# Patient Record
Sex: FEMALE | Race: WHITE | NOT HISPANIC OR LATINO | Employment: OTHER | URBAN - METROPOLITAN AREA
[De-identification: names, ages, dates, MRNs, and addresses within clinical notes are randomized per-mention and may not be internally consistent; named-entity substitution may affect disease eponyms.]

---

## 2017-10-11 ENCOUNTER — CONVERSION ENCOUNTER (OUTPATIENT)
Dept: OTHER | Facility: HOSPITAL | Age: 72
End: 2017-10-11

## 2018-05-04 ENCOUNTER — OFFICE VISIT CONVERTED (OUTPATIENT)
Dept: FAMILY MEDICINE CLINIC | Age: 73
End: 2018-05-04
Attending: FAMILY MEDICINE

## 2018-07-19 ENCOUNTER — OFFICE VISIT CONVERTED (OUTPATIENT)
Dept: FAMILY MEDICINE CLINIC | Age: 73
End: 2018-07-19
Attending: FAMILY MEDICINE

## 2018-10-16 ENCOUNTER — CONVERSION ENCOUNTER (OUTPATIENT)
Dept: OTHER | Facility: HOSPITAL | Age: 73
End: 2018-10-16

## 2019-01-03 ENCOUNTER — OFFICE VISIT CONVERTED (OUTPATIENT)
Dept: FAMILY MEDICINE CLINIC | Age: 74
End: 2019-01-03
Attending: FAMILY MEDICINE

## 2019-01-03 ENCOUNTER — HOSPITAL ENCOUNTER (OUTPATIENT)
Dept: OTHER | Facility: HOSPITAL | Age: 74
Discharge: HOME | End: 2019-01-03
Attending: FAMILY MEDICINE

## 2019-01-03 LAB
APPEARANCE UR: ABNORMAL
BACTERIA UR CULT: NORMAL
BACTERIA UR QL AUTO: ABNORMAL
BILIRUB UR QL: NEGATIVE
CASTS URNS QL MICRO: ABNORMAL /[LPF]
COLOR UR: YELLOW
CONV LEUKOCYTE ESTERASE: ABNORMAL
CONV UROBILINOGEN IN URINE BY AUTOMATED TEST STRIP: 1 {EHRLICHU}/DL (ref 0.1–1)
EPI CELLS #/AREA URNS HPF: ABNORMAL /[HPF]
GLUCOSE 24H UR-MCNC: NEGATIVE MG/DL
HGB UR QL STRIP: NEGATIVE
KETONES UR QL STRIP: NEGATIVE MG/DL
MUCOUS THREADS URNS QL MICRO: ABNORMAL
NITRITE UR-MCNC: NEGATIVE MG/ML
PH UR STRIP.AUTO: 6.5 [PH] (ref 5–8)
PROT UR-MCNC: ABNORMAL MG/DL
RBC # BLD AUTO: ABNORMAL /[HPF]
SP GR UR STRIP: 1.02 (ref 1–1.03)
SPECIMEN SOURCE: ABNORMAL
UNIDENT CRYS URNS QL MICRO: ABNORMAL /[HPF]
WBC #/AREA URNS HPF: ABNORMAL /[HPF]

## 2019-01-05 LAB — BACTERIA UR CULT: NORMAL

## 2019-01-08 ENCOUNTER — HOSPITAL ENCOUNTER (OUTPATIENT)
Dept: OTHER | Facility: HOSPITAL | Age: 74
Discharge: HOME | End: 2019-01-08
Attending: FAMILY MEDICINE

## 2019-01-08 LAB
APPEARANCE UR: CLEAR
BACTERIA UR CULT: NORMAL
BACTERIA UR QL AUTO: ABNORMAL
CASTS URNS QL MICRO: ABNORMAL /[LPF]
COLOR UR: ABNORMAL
EPI CELLS #/AREA URNS HPF: ABNORMAL /[HPF]
MUCOUS THREADS URNS QL MICRO: ABNORMAL
RBC # BLD AUTO: ABNORMAL /[HPF]
SP GR UR STRIP: 1.01 (ref 1–1.03)
SPECIMEN SOURCE: ABNORMAL
UNIDENT CRYS URNS QL MICRO: ABNORMAL /[HPF]
WBC #/AREA URNS HPF: ABNORMAL /[HPF]

## 2019-01-10 LAB — BACTERIA UR CULT: NORMAL

## 2019-02-04 ENCOUNTER — HOSPITAL ENCOUNTER (OUTPATIENT)
Dept: OTHER | Facility: HOSPITAL | Age: 74
Discharge: HOME | End: 2019-02-04
Attending: FAMILY MEDICINE

## 2019-02-04 LAB
APPEARANCE UR: CLEAR
BACTERIA UR QL AUTO: NORMAL
BILIRUB UR QL: NEGATIVE
CASTS URNS QL MICRO: NORMAL /[LPF]
COLOR UR: YELLOW
CONV LEUKOCYTE ESTERASE: NEGATIVE
CONV UROBILINOGEN IN URINE BY AUTOMATED TEST STRIP: 0.2 {EHRLICHU}/DL (ref 0.1–1)
EPI CELLS #/AREA URNS HPF: NORMAL /[HPF]
GLUCOSE 24H UR-MCNC: NEGATIVE MG/DL
HGB UR QL STRIP: NEGATIVE
KETONES UR QL STRIP: NEGATIVE MG/DL
MUCOUS THREADS URNS QL MICRO: NORMAL
NITRITE UR-MCNC: NEGATIVE MG/ML
PH UR STRIP.AUTO: 5.5 [PH] (ref 5–8)
PROT UR-MCNC: NEGATIVE MG/DL
RBC # BLD AUTO: NORMAL /[HPF]
SP GR UR STRIP: 1.01 (ref 1–1.03)
SPECIMEN SOURCE: NORMAL
UNIDENT CRYS URNS QL MICRO: NORMAL /[HPF]
WBC #/AREA URNS HPF: NORMAL /[HPF]

## 2019-03-28 ENCOUNTER — OFFICE VISIT CONVERTED (OUTPATIENT)
Dept: FAMILY MEDICINE CLINIC | Age: 74
End: 2019-03-28
Attending: FAMILY MEDICINE

## 2019-05-29 ENCOUNTER — HOSPITAL ENCOUNTER (OUTPATIENT)
Dept: OTHER | Facility: HOSPITAL | Age: 74
Discharge: HOME | End: 2019-05-29
Attending: INTERNAL MEDICINE

## 2019-05-29 LAB
ALBUMIN SERPL-MCNC: 4.8 G/DL (ref 3.5–5)
ALBUMIN/GLOB SERPL: 1.7 {RATIO} (ref 1.4–2.6)
ALP SERPL-CCNC: 98 U/L (ref 43–160)
ALT SERPL-CCNC: 13 U/L (ref 10–40)
ANION GAP SERPL CALC-SCNC: 19 MMOL/L (ref 8–19)
APPEARANCE UR: CLEAR
AST SERPL-CCNC: 18 U/L (ref 15–50)
BACTERIA UR QL AUTO: ABNORMAL
BASOPHILS # BLD MANUAL: 0.05 10*3/UL (ref 0–0.2)
BASOPHILS NFR BLD MANUAL: 0.9 % (ref 0–3)
BILIRUB SERPL-MCNC: 0.27 MG/DL (ref 0.2–1.3)
BILIRUB UR QL: NEGATIVE
BUN SERPL-MCNC: 24 MG/DL (ref 5–25)
BUN/CREAT SERPL: 17 {RATIO} (ref 6–20)
CALCIUM SERPL-MCNC: 10 MG/DL (ref 8.7–10.4)
CASTS URNS QL MICRO: ABNORMAL /[LPF]
CHLORIDE SERPL-SCNC: 94 MMOL/L (ref 99–111)
COLOR UR: YELLOW
CONV CO2: 24 MMOL/L (ref 22–32)
CONV CREATININE URINE, RANDOM: 26.5 MG/DL (ref 10–300)
CONV LEUKOCYTE ESTERASE: NEGATIVE
CONV MICROALBUM.,U,RANDOM: 53.3 MG/L (ref 0–20)
CONV TOTAL PROTEIN: 7.7 G/DL (ref 6.3–8.2)
CONV UROBILINOGEN IN URINE BY AUTOMATED TEST STRIP: 0.2 {EHRLICHU}/DL (ref 0.1–1)
CREAT UR-MCNC: 1.39 MG/DL (ref 0.5–0.9)
DEPRECATED RDW RBC AUTO: 39.1 FL
EOSINOPHIL # BLD MANUAL: 0.26 10*3/UL (ref 0–0.7)
EOSINOPHIL NFR BLD MANUAL: 4.8 % (ref 0–7)
EPI CELLS #/AREA URNS HPF: ABNORMAL /[HPF]
ERYTHROCYTE [DISTWIDTH] IN BLOOD BY AUTOMATED COUNT: 12 % (ref 11.5–14.5)
GFR SERPLBLD BASED ON 1.73 SQ M-ARVRAT: 38 ML/MIN/{1.73_M2}
GLOBULIN UR ELPH-MCNC: 2.9 G/DL (ref 2–3.5)
GLUCOSE 24H UR-MCNC: NEGATIVE MG/DL
GLUCOSE SERPL-MCNC: 109 MG/DL (ref 65–99)
GRANS (ABSOLUTE): 2.58 10*3/UL (ref 2–8)
GRANS: 47.7 % (ref 30–85)
HBA1C MFR BLD: 12.2 G/DL (ref 12–16)
HCT VFR BLD AUTO: 34.5 % (ref 37–47)
HGB UR QL STRIP: NEGATIVE
IMM GRANULOCYTES # BLD: 0.01 10*3/UL (ref 0–0.54)
IMM GRANULOCYTES NFR BLD: 0.2 % (ref 0–0.43)
KETONES UR QL STRIP: NEGATIVE MG/DL
LYMPHOCYTES # BLD MANUAL: 2.01 10*3/UL (ref 1–5)
LYMPHOCYTES NFR BLD MANUAL: 9.2 % (ref 3–10)
MCH RBC QN AUTO: 31.4 PG (ref 27–31)
MCHC RBC AUTO-ENTMCNC: 35.4 G/DL (ref 33–37)
MCV RBC AUTO: 88.7 FL (ref 81–99)
MICROALBUMIN/CREAT UR: 201.1 MG/G{CRE} (ref 0–35)
MONOCYTES # BLD AUTO: 0.5 10*3/UL (ref 0.2–1.2)
MUCOUS THREADS URNS QL MICRO: ABNORMAL
NITRITE UR-MCNC: NEGATIVE MG/ML
OSMOLALITY SERPL CALC.SUM OF ELEC: 279 MOSM/KG (ref 273–304)
PH UR STRIP.AUTO: 6 [PH] (ref 5–8)
PLATELET # BLD AUTO: 374 10*3/UL (ref 130–400)
PMV BLD AUTO: 8.6 FL (ref 7.4–10.4)
POTASSIUM SERPL-SCNC: 4.5 MMOL/L (ref 3.5–5.3)
PROT UR-MCNC: NEGATIVE MG/DL
RBC # BLD AUTO: 3.89 10*6/UL (ref 4.2–5.4)
RBC # BLD AUTO: ABNORMAL /[HPF]
SODIUM SERPL-SCNC: 132 MMOL/L (ref 135–147)
SP GR UR STRIP: 1.01 (ref 1–1.03)
SPECIMEN SOURCE: ABNORMAL
UNIDENT CRYS URNS QL MICRO: ABNORMAL /[HPF]
VARIANT LYMPHS NFR BLD MANUAL: 37.2 % (ref 20–45)
WBC # BLD AUTO: 5.41 10*3/UL (ref 4.8–10.8)
WBC #/AREA URNS HPF: ABNORMAL /[HPF]

## 2019-06-24 ENCOUNTER — OFFICE VISIT CONVERTED (OUTPATIENT)
Dept: FAMILY MEDICINE CLINIC | Age: 74
End: 2019-06-24
Attending: FAMILY MEDICINE

## 2019-07-03 ENCOUNTER — OFFICE VISIT CONVERTED (OUTPATIENT)
Dept: FAMILY MEDICINE CLINIC | Age: 74
End: 2019-07-03
Attending: NURSE PRACTITIONER

## 2019-08-12 ENCOUNTER — OFFICE VISIT CONVERTED (OUTPATIENT)
Dept: FAMILY MEDICINE CLINIC | Age: 74
End: 2019-08-12
Attending: NURSE PRACTITIONER

## 2019-09-16 ENCOUNTER — OFFICE VISIT CONVERTED (OUTPATIENT)
Dept: FAMILY MEDICINE CLINIC | Age: 74
End: 2019-09-16
Attending: FAMILY MEDICINE

## 2019-10-22 ENCOUNTER — HOSPITAL ENCOUNTER (OUTPATIENT)
Dept: OTHER | Facility: HOSPITAL | Age: 74
Discharge: HOME | End: 2019-10-22
Attending: FAMILY MEDICINE

## 2019-10-22 ENCOUNTER — OFFICE VISIT CONVERTED (OUTPATIENT)
Dept: FAMILY MEDICINE CLINIC | Age: 74
End: 2019-10-22
Attending: FAMILY MEDICINE

## 2019-10-23 ENCOUNTER — HOSPITAL ENCOUNTER (OUTPATIENT)
Dept: OTHER | Facility: HOSPITAL | Age: 74
Discharge: HOME | End: 2019-10-23
Attending: FAMILY MEDICINE

## 2019-11-18 ENCOUNTER — HOSPITAL ENCOUNTER (OUTPATIENT)
Dept: OTHER | Facility: HOSPITAL | Age: 74
Discharge: HOME | End: 2019-11-18
Attending: FAMILY MEDICINE

## 2019-11-25 ENCOUNTER — HOSPITAL ENCOUNTER (OUTPATIENT)
Dept: OTHER | Facility: HOSPITAL | Age: 74
Discharge: HOME | End: 2019-11-25
Attending: INTERNAL MEDICINE

## 2019-11-25 LAB
ALBUMIN SERPL-MCNC: 4.7 G/DL (ref 3.5–5)
ALBUMIN/GLOB SERPL: 1.4 {RATIO} (ref 1.4–2.6)
ALP SERPL-CCNC: 106 U/L (ref 43–160)
ALT SERPL-CCNC: 12 U/L (ref 10–40)
ANION GAP SERPL CALC-SCNC: 20 MMOL/L (ref 8–19)
AST SERPL-CCNC: 20 U/L (ref 15–50)
BILIRUB SERPL-MCNC: 0.23 MG/DL (ref 0.2–1.3)
BUN SERPL-MCNC: 22 MG/DL (ref 5–25)
BUN/CREAT SERPL: 15 {RATIO} (ref 6–20)
CALCIUM SERPL-MCNC: 9.8 MG/DL (ref 8.7–10.4)
CHLORIDE SERPL-SCNC: 96 MMOL/L (ref 99–111)
CONV CO2: 23 MMOL/L (ref 22–32)
CONV TOTAL PROTEIN: 8 G/DL (ref 6.3–8.2)
CREAT UR-MCNC: 1.5 MG/DL (ref 0.5–0.9)
GFR SERPLBLD BASED ON 1.73 SQ M-ARVRAT: 34 ML/MIN/{1.73_M2}
GLOBULIN UR ELPH-MCNC: 3.3 G/DL (ref 2–3.5)
GLUCOSE SERPL-MCNC: 85 MG/DL (ref 65–99)
OSMOLALITY SERPL CALC.SUM OF ELEC: 283 MOSM/KG (ref 273–304)
POTASSIUM SERPL-SCNC: 4.2 MMOL/L (ref 3.5–5.3)
SODIUM SERPL-SCNC: 135 MMOL/L (ref 135–147)

## 2019-12-13 ENCOUNTER — OFFICE VISIT CONVERTED (OUTPATIENT)
Dept: FAMILY MEDICINE CLINIC | Age: 74
End: 2019-12-13
Attending: FAMILY MEDICINE

## 2019-12-13 ENCOUNTER — HOSPITAL ENCOUNTER (OUTPATIENT)
Dept: OTHER | Facility: HOSPITAL | Age: 74
Discharge: HOME | End: 2019-12-13
Attending: FAMILY MEDICINE

## 2019-12-13 LAB
ALBUMIN SERPL-MCNC: 4.7 G/DL (ref 3.5–5)
ALBUMIN/GLOB SERPL: 1.5 {RATIO} (ref 1.4–2.6)
ALP SERPL-CCNC: 113 U/L (ref 43–160)
ALT SERPL-CCNC: 14 U/L (ref 10–40)
ANION GAP SERPL CALC-SCNC: 17 MMOL/L (ref 8–19)
AST SERPL-CCNC: 20 U/L (ref 15–50)
BILIRUB SERPL-MCNC: 0.19 MG/DL (ref 0.2–1.3)
BUN SERPL-MCNC: 20 MG/DL (ref 5–25)
BUN/CREAT SERPL: 13 {RATIO} (ref 6–20)
CALCIUM SERPL-MCNC: 9.6 MG/DL (ref 8.7–10.4)
CHLORIDE SERPL-SCNC: 98 MMOL/L (ref 99–111)
CHOLEST SERPL-MCNC: 155 MG/DL (ref 107–200)
CHOLEST/HDLC SERPL: 2.9 {RATIO} (ref 3–6)
CONV CO2: 23 MMOL/L (ref 22–32)
CONV TOTAL PROTEIN: 7.9 G/DL (ref 6.3–8.2)
CREAT UR-MCNC: 1.59 MG/DL (ref 0.5–0.9)
GFR SERPLBLD BASED ON 1.73 SQ M-ARVRAT: 32 ML/MIN/{1.73_M2}
GLOBULIN UR ELPH-MCNC: 3.2 G/DL (ref 2–3.5)
GLUCOSE SERPL-MCNC: 90 MG/DL (ref 65–99)
HDLC SERPL-MCNC: 53 MG/DL (ref 40–60)
LDLC SERPL CALC-MCNC: 80 MG/DL (ref 70–100)
OSMOLALITY SERPL CALC.SUM OF ELEC: 280 MOSM/KG (ref 273–304)
POTASSIUM SERPL-SCNC: 4.4 MMOL/L (ref 3.5–5.3)
SODIUM SERPL-SCNC: 134 MMOL/L (ref 135–147)
TRIGL SERPL-MCNC: 109 MG/DL (ref 40–150)
TSH SERPL-ACNC: 3.99 M[IU]/L (ref 0.27–4.2)
VLDLC SERPL-MCNC: 22 MG/DL (ref 5–37)

## 2020-03-31 ENCOUNTER — HOSPITAL ENCOUNTER (OUTPATIENT)
Dept: OTHER | Facility: HOSPITAL | Age: 75
Discharge: HOME | End: 2020-03-31
Attending: FAMILY MEDICINE

## 2020-03-31 LAB
25(OH)D3 SERPL-MCNC: 32.5 NG/ML (ref 30–100)
ALBUMIN SERPL-MCNC: 4.4 G/DL (ref 3.5–5)
ALBUMIN/GLOB SERPL: 1.5 {RATIO} (ref 1.4–2.6)
ALP SERPL-CCNC: 119 U/L (ref 43–160)
ALT SERPL-CCNC: 15 U/L (ref 10–40)
ANION GAP SERPL CALC-SCNC: 16 MMOL/L (ref 8–19)
APPEARANCE UR: ABNORMAL
AST SERPL-CCNC: 20 U/L (ref 15–50)
BACTERIA UR QL AUTO: ABNORMAL
BILIRUB SERPL-MCNC: 0.23 MG/DL (ref 0.2–1.3)
BILIRUB UR QL: NEGATIVE
BUN SERPL-MCNC: 32 MG/DL (ref 5–25)
BUN/CREAT SERPL: 24 {RATIO} (ref 6–20)
CALCIUM SERPL-MCNC: 9.3 MG/DL (ref 8.7–10.4)
CASTS URNS QL MICRO: ABNORMAL /[LPF]
CHLORIDE SERPL-SCNC: 100 MMOL/L (ref 99–111)
COLOR UR: YELLOW
CONV CO2: 24 MMOL/L (ref 22–32)
CONV LEUKOCYTE ESTERASE: ABNORMAL
CONV TOTAL PROTEIN: 7.4 G/DL (ref 6.3–8.2)
CONV UROBILINOGEN IN URINE BY AUTOMATED TEST STRIP: 0.2 {EHRLICHU}/DL (ref 0.1–1)
CREAT UR-MCNC: 1.36 MG/DL (ref 0.5–0.9)
EPI CELLS #/AREA URNS HPF: ABNORMAL /[HPF]
GFR SERPLBLD BASED ON 1.73 SQ M-ARVRAT: 39 ML/MIN/{1.73_M2}
GLOBULIN UR ELPH-MCNC: 3 G/DL (ref 2–3.5)
GLUCOSE 24H UR-MCNC: NEGATIVE MG/DL
GLUCOSE SERPL-MCNC: 98 MG/DL (ref 65–99)
HGB UR QL STRIP: ABNORMAL
KETONES UR QL STRIP: NEGATIVE MG/DL
MUCOUS THREADS URNS QL MICRO: ABNORMAL
NITRITE UR-MCNC: POSITIVE MG/ML
OSMOLALITY SERPL CALC.SUM OF ELEC: 289 MOSM/KG (ref 273–304)
PH UR STRIP.AUTO: 6 [PH] (ref 5–8)
POTASSIUM SERPL-SCNC: 4.3 MMOL/L (ref 3.5–5.3)
PROT UR-MCNC: NEGATIVE MG/DL
RBC # BLD AUTO: ABNORMAL /[HPF]
SODIUM SERPL-SCNC: 136 MMOL/L (ref 135–147)
SP GR UR STRIP: 1.02 (ref 1–1.03)
SPECIMEN SOURCE: ABNORMAL
UNIDENT CRYS URNS QL MICRO: ABNORMAL /[HPF]
WBC #/AREA URNS HPF: ABNORMAL /[HPF]

## 2020-06-25 ENCOUNTER — HOSPITAL ENCOUNTER (OUTPATIENT)
Dept: OTHER | Facility: HOSPITAL | Age: 75
Discharge: HOME | End: 2020-06-25
Attending: NURSE PRACTITIONER

## 2020-06-25 ENCOUNTER — OFFICE VISIT CONVERTED (OUTPATIENT)
Dept: FAMILY MEDICINE CLINIC | Age: 75
End: 2020-06-25
Attending: FAMILY MEDICINE

## 2020-06-25 LAB
ALBUMIN SERPL-MCNC: 4.4 G/DL (ref 3.5–5)
ALBUMIN/GLOB SERPL: 1.3 {RATIO} (ref 1.4–2.6)
ALP SERPL-CCNC: 136 U/L (ref 43–160)
ALT SERPL-CCNC: 11 U/L (ref 10–40)
ANION GAP SERPL CALC-SCNC: 17 MMOL/L (ref 8–19)
AST SERPL-CCNC: 19 U/L (ref 15–50)
BASOPHILS # BLD MANUAL: 0.04 10*3/UL (ref 0–0.2)
BASOPHILS NFR BLD MANUAL: 0.6 % (ref 0–3)
BILIRUB SERPL-MCNC: 0.17 MG/DL (ref 0.2–1.3)
BUN SERPL-MCNC: 27 MG/DL (ref 5–25)
BUN/CREAT SERPL: 21 {RATIO} (ref 6–20)
CALCIUM SERPL-MCNC: 9.4 MG/DL (ref 8.7–10.4)
CHLORIDE SERPL-SCNC: 98 MMOL/L (ref 99–111)
CONV CO2: 23 MMOL/L (ref 22–32)
CONV TOTAL PROTEIN: 7.7 G/DL (ref 6.3–8.2)
CREAT UR-MCNC: 1.3 MG/DL (ref 0.5–0.9)
DEPRECATED RDW RBC AUTO: 40.3 FL
EOSINOPHIL # BLD MANUAL: 0.24 10*3/UL (ref 0–0.7)
EOSINOPHIL NFR BLD MANUAL: 3.5 % (ref 0–7)
ERYTHROCYTE [DISTWIDTH] IN BLOOD BY AUTOMATED COUNT: 12.2 % (ref 11.5–14.5)
GFR SERPLBLD BASED ON 1.73 SQ M-ARVRAT: 41 ML/MIN/{1.73_M2}
GLOBULIN UR ELPH-MCNC: 3.3 G/DL (ref 2–3.5)
GLUCOSE SERPL-MCNC: 94 MG/DL (ref 65–99)
GRANS (ABSOLUTE): 3.19 10*3/UL (ref 2–8)
GRANS: 46.3 % (ref 30–85)
HBA1C MFR BLD: 11.6 G/DL (ref 12–16)
HCT VFR BLD AUTO: 33.3 % (ref 37–47)
IMM GRANULOCYTES # BLD: 0 10*3/UL (ref 0–0.54)
IMM GRANULOCYTES NFR BLD: 0 % (ref 0–0.43)
LYMPHOCYTES # BLD MANUAL: 2.8 10*3/UL (ref 1–5)
LYMPHOCYTES NFR BLD MANUAL: 9 % (ref 3–10)
MCH RBC QN AUTO: 31 PG (ref 27–31)
MCHC RBC AUTO-ENTMCNC: 34.8 G/DL (ref 33–37)
MCV RBC AUTO: 89 FL (ref 81–99)
MONOCYTES # BLD AUTO: 0.62 10*3/UL (ref 0.2–1.2)
OSMOLALITY SERPL CALC.SUM OF ELEC: 283 MOSM/KG (ref 273–304)
PLATELET # BLD AUTO: 382 10*3/UL (ref 130–400)
PMV BLD AUTO: 8.9 FL (ref 7.4–10.4)
POTASSIUM SERPL-SCNC: 4 MMOL/L (ref 3.5–5.3)
RBC # BLD AUTO: 3.74 10*6/UL (ref 4.2–5.4)
SODIUM SERPL-SCNC: 134 MMOL/L (ref 135–147)
VARIANT LYMPHS NFR BLD MANUAL: 40.6 % (ref 20–45)
WBC # BLD AUTO: 6.89 10*3/UL (ref 4.8–10.8)

## 2020-06-26 LAB
APPEARANCE UR: CLEAR
BILIRUB UR QL: NEGATIVE
COLOR UR: YELLOW
CONV CREATININE URINE, RANDOM: 67 MG/DL (ref 10–300)
CONV LEUKOCYTE ESTERASE: NEGATIVE
CONV MICROALBUM.,U,RANDOM: 24.1 MG/L (ref 0–20)
CONV UROBILINOGEN IN URINE BY AUTOMATED TEST STRIP: 0.2 {EHRLICHU}/DL (ref 0.1–1)
GLUCOSE 24H UR-MCNC: NEGATIVE MG/DL
HGB UR QL STRIP: NEGATIVE
KETONES UR QL STRIP: NEGATIVE MG/DL
MICROALBUMIN/CREAT UR: 36 MG/G{CRE} (ref 0–35)
NITRITE UR-MCNC: NEGATIVE MG/ML
PH UR STRIP.AUTO: 5.5 [PH] (ref 5–8)
PROT UR-MCNC: NEGATIVE MG/DL
SP GR UR STRIP: 1.01 (ref 1–1.03)
SPECIMEN SOURCE: NORMAL

## 2020-10-22 ENCOUNTER — OFFICE VISIT CONVERTED (OUTPATIENT)
Dept: FAMILY MEDICINE CLINIC | Age: 75
End: 2020-10-22
Attending: FAMILY MEDICINE

## 2020-10-22 ENCOUNTER — HOSPITAL ENCOUNTER (OUTPATIENT)
Dept: OTHER | Facility: HOSPITAL | Age: 75
Discharge: HOME | End: 2020-10-22
Attending: FAMILY MEDICINE

## 2020-10-22 LAB
25(OH)D3 SERPL-MCNC: 35.6 NG/ML (ref 30–100)
ALBUMIN SERPL-MCNC: 4.6 G/DL (ref 3.5–5)
ALBUMIN/GLOB SERPL: 1.5 {RATIO} (ref 1.4–2.6)
ALP SERPL-CCNC: 104 U/L (ref 43–160)
ALT SERPL-CCNC: 14 U/L (ref 10–40)
ANION GAP SERPL CALC-SCNC: 16 MMOL/L (ref 8–19)
AST SERPL-CCNC: 20 U/L (ref 15–50)
BASOPHILS # BLD AUTO: 0.06 10*3/UL (ref 0–0.2)
BASOPHILS NFR BLD AUTO: 1 % (ref 0–3)
BILIRUB SERPL-MCNC: 0.24 MG/DL (ref 0.2–1.3)
BUN SERPL-MCNC: 21 MG/DL (ref 5–25)
BUN/CREAT SERPL: 14 {RATIO} (ref 6–20)
CALCIUM SERPL-MCNC: 9.8 MG/DL (ref 8.7–10.4)
CHLORIDE SERPL-SCNC: 98 MMOL/L (ref 99–111)
CHOLEST SERPL-MCNC: 190 MG/DL (ref 107–200)
CHOLEST/HDLC SERPL: 3.1 {RATIO} (ref 3–6)
CK SERPL-CCNC: 97 U/L (ref 35–230)
CONV ABS IMM GRAN: 0.01 10*3/UL (ref 0–0.2)
CONV CO2: 26 MMOL/L (ref 22–32)
CONV IMMATURE GRAN: 0.2 % (ref 0–1.8)
CONV TOTAL PROTEIN: 7.7 G/DL (ref 6.3–8.2)
CREAT UR-MCNC: 1.45 MG/DL (ref 0.5–0.9)
DEPRECATED RDW RBC AUTO: 40.2 FL (ref 36.4–46.3)
EOSINOPHIL # BLD AUTO: 0.29 10*3/UL (ref 0–0.7)
EOSINOPHIL # BLD AUTO: 4.6 % (ref 0–7)
ERYTHROCYTE [DISTWIDTH] IN BLOOD BY AUTOMATED COUNT: 12.1 % (ref 11.7–14.4)
GFR SERPLBLD BASED ON 1.73 SQ M-ARVRAT: 36 ML/MIN/{1.73_M2}
GLOBULIN UR ELPH-MCNC: 3.1 G/DL (ref 2–3.5)
GLUCOSE SERPL-MCNC: 93 MG/DL (ref 65–99)
HCT VFR BLD AUTO: 33.5 % (ref 37–47)
HDLC SERPL-MCNC: 62 MG/DL (ref 40–60)
HGB BLD-MCNC: 11.5 G/DL (ref 12–16)
LDLC SERPL CALC-MCNC: 97 MG/DL (ref 70–100)
LYMPHOCYTES # BLD AUTO: 2.72 10*3/UL (ref 1–5)
LYMPHOCYTES NFR BLD AUTO: 43.4 % (ref 20–45)
MCH RBC QN AUTO: 30.9 PG (ref 27–31)
MCHC RBC AUTO-ENTMCNC: 34.3 G/DL (ref 33–37)
MCV RBC AUTO: 90.1 FL (ref 81–99)
MONOCYTES # BLD AUTO: 0.52 10*3/UL (ref 0.2–1.2)
MONOCYTES NFR BLD AUTO: 8.3 % (ref 3–10)
NEUTROPHILS # BLD AUTO: 2.67 10*3/UL (ref 2–8)
NEUTROPHILS NFR BLD AUTO: 42.5 % (ref 30–85)
NRBC CBCN: 0 % (ref 0–0.7)
OSMOLALITY SERPL CALC.SUM OF ELEC: 285 MOSM/KG (ref 273–304)
PLATELET # BLD AUTO: 358 10*3/UL (ref 130–400)
PMV BLD AUTO: 9.1 FL (ref 9.4–12.3)
POTASSIUM SERPL-SCNC: 3.6 MMOL/L (ref 3.5–5.3)
RBC # BLD AUTO: 3.72 10*6/UL (ref 4.2–5.4)
SODIUM SERPL-SCNC: 136 MMOL/L (ref 135–147)
TRIGL SERPL-MCNC: 153 MG/DL (ref 40–150)
TSH SERPL-ACNC: 3.51 M[IU]/L (ref 0.27–4.2)
VLDLC SERPL-MCNC: 31 MG/DL (ref 5–37)
WBC # BLD AUTO: 6.27 10*3/UL (ref 4.8–10.8)

## 2020-12-18 ENCOUNTER — HOSPITAL ENCOUNTER (OUTPATIENT)
Dept: OTHER | Facility: HOSPITAL | Age: 75
Discharge: HOME | End: 2020-12-18
Attending: FAMILY MEDICINE

## 2021-01-22 ENCOUNTER — OFFICE VISIT CONVERTED (OUTPATIENT)
Dept: NEUROSURGERY | Facility: CLINIC | Age: 76
End: 2021-01-22
Attending: PHYSICIAN ASSISTANT

## 2021-01-22 ENCOUNTER — CONVERSION ENCOUNTER (OUTPATIENT)
Dept: NEUROLOGY | Facility: CLINIC | Age: 76
End: 2021-01-22

## 2021-03-09 ENCOUNTER — HOSPITAL ENCOUNTER (OUTPATIENT)
Dept: VACCINE CLINIC | Facility: HOSPITAL | Age: 76
Discharge: HOME | End: 2021-03-09
Attending: INTERNAL MEDICINE

## 2021-03-30 ENCOUNTER — HOSPITAL ENCOUNTER (OUTPATIENT)
Dept: VACCINE CLINIC | Facility: HOSPITAL | Age: 76
Discharge: HOME | End: 2021-03-30
Attending: INTERNAL MEDICINE

## 2021-04-09 ENCOUNTER — OFFICE VISIT CONVERTED (OUTPATIENT)
Dept: FAMILY MEDICINE CLINIC | Age: 76
End: 2021-04-09
Attending: FAMILY MEDICINE

## 2021-04-29 ENCOUNTER — HOSPITAL ENCOUNTER (OUTPATIENT)
Dept: OTHER | Facility: HOSPITAL | Age: 76
Discharge: HOME | End: 2021-04-29
Attending: FAMILY MEDICINE

## 2021-05-10 NOTE — H&P
History and Physical      Patient Name: Cuauhtemoc Davis   Patient ID: [id number]   Sex: Female   YOB: 1945    Primary Care Provider: Waylon Tang MD   Referring Provider: Waylon Tang MD    Visit Date: January 22, 2021    Provider: Daniela Vidal PA-C   Location: [facility]   Location Address: [address] [place] [address] [place], [place]  [id number]   Location Phone: [phone]          Chief Complaint  · Neck pain      History Of Present Illness  The patient is a 75 year old /White female, who presents on referral from Waylon Tang MD, for a neurosurgical evaluation for a history of neck pain.   The neck pain is localized to the posterior cervical region and has been present for over a year now. It is moderate (3-6/10) in severity and radiates into the left shoulder distribution. The pain is described as being intermittent and it is generally following no specific pattern. The patient states the pain is aggravated by head turning. She Improves some with topical cream.   The onset of the symptoms was not associated with any specific event or activity.   She also reports intermittent arm weakness bilaterally. The patient denies bowel or bladder dysfunction. The patient's past medical history is notable for left thoracic outlet surgery over 14 years ago.   RECENT INTERVENTIONS:  She reports undergoing no recent treatment for the symptoms described above.   INFORMATION REVIEWED:  The following information was reviewed: radiology reports and radiographic images. The MRI of the cervical spine and from [facility] from December 2020 revealed chronic T4 compression fracture. Multilevel degenerative changes in the cervical spine. These were the most notable findings.       Past Medical History  High blood cholesterol; High blood pressure; Migraine; Neck pain         Past Surgical History  Cataract surgery; Hysterectomy (abdominal); shoulder repair         Medication  "List  alendronate 70 mg oral tablet; amitriptyline 100 mg oral tablet; lorazepam 0.5 mg oral tablet; losartan-hydrochlorothiazide 100-25 mg oral tablet; sertraline 100 mg oral tablet; simvastatin 20 mg oral tablet         Allergy List  PENICILLINS       Allergies Reconciled  Family Medical History  Osteoporosis         Social History  Tobacco (Never)         Review of Systems  · Constitutional  o Denies  o : chills, excessive sweating, fatigue, fever, sycope/passing out, weight gain, weight loss  · Eyes  o Denies  o : changes in vision, blurry vision, double vision  · HENT  o Admits  o : nasal congestion  o Denies  o : loss of hearing, ringing in the ears, ear aches, sore throat, sinus pain, nose bleeds, seasonal allergies  · Cardiovascular  o Denies  o : blood clots, swollen legs, anemia, easy burising or bleeding, transfusions  · Respiratory  o Denies  o : shortness of breath, dry cough, productive cough, pneumonia, COPD  · Gastrointestinal  o Denies  o : difficulty swallowing, reflux  · Genitourinary  o Denies  o : incontinence  · Neurologic  o Admits  o : loss of balance, falls  o Denies  o : headache, seizure, stroke, tremor, dizziness/vertigo, difficulty with sleep, numbness/tingling/paresthesia , difficulty with coordination, difficulty with dexterity, weakness  · Musculoskeletal  o Admits  o : neck stiffness/pain, muscle aches, joint pain, weakness  o Denies  o : swollen lymph nodes, spasms, sciatica, pain radiating in arm, pain radiating in leg, low back pain  · Endocrine  o Denies  o : diabetes, thyroid disorder  · Psychiatric  o Admits  o : anxiety, depression  · All Others Negative      Vitals  Date Time BP Position Site L\R Cuff Size HR RR TEMP (F) WT  HT  BMI kg/m2 BSA m2 O2 Sat FR L/min FiO2 HC       01/22/2021 11:04 AM        96.9 148lbs 8oz 5'  4\" 25.49 1.74             Physical Examination  · Constitutional  o Appearance  o : well-nourished, well developed, alert, in no acute " distress  · Neck  o Inspection/Palpation  o : normal appearance, no masses or tenderness, trachea midline  o Range of Motion  o : cervical range of motion within normal limits  · Respiratory  o Respiratory Effort  o : breathing unlabored  · Cardiovascular  o Peripheral Vascular System  o :   § Extremities  § : no edema or cyanosis  · Musculoskeletal  o Spine  o :   § Stability  § : no subluxations present  § Muscle Strength/Tone  § : paraspinal muscle strength within normal limits, paraspinal muscle tone within normal limits  o Right Upper Extremity  o :   § Inspection/Palpation  § : no tenderness to palpation  § Joint Stability  § : shoulder, elbow and wrist joint stability normal  § Range of Motion  § : range of motion normal, no joint crepitus or pain with motion present,shoulder negative  o Left Upper Extremity  o :   § Inspection/Palpation  § : no tenderness to palpation  § Joint Stability  § : shoulder, elbow and wrist joint stability normal  § Range of Motion  § : range of motion normal, no joint crepitus present, no pain with joint motion, shoulder negative  · Skin and Subcutaneous Tissue  o Neck  o : no lesions or areas of discoloration  · Neurologic  o Mental Status Examination  o :   § Orientation  § : alert and oriented to person, place, time and events  o Motor Examination  o :   § RUE Strength  § : strength normal  § RUE Motor Function  § : tone normal, muscle bulk normal  § LUE Strength  § : strength normal  § LUE Motor Function  § : tone normal, muscle bulk normal  o Reflexes  o :   § RUE  § : 1/4 in biceps/triceps/brachioradialis, Hays sign negative  § LUE  § : 1/4 in biceps/triceps/brachioradialis, Hays sign negative  o Sensation  o :   § Light Touch  § : sensation intact to light touch in extremities  o Gait and Station  o :   § Gait Screening  § : slow and slightly antalgic  · Psychiatric  o Mood and Affect  o : mood normal, affect  appropriate          Assessment  · Cervicalgia     723.1/M54.2  · Cervical disc degeneration     722.4/M50.30  · Cervical disc disorder     722.91/M50.90      Plan  · Orders  o PAIN MANAGEMENT CONSULTATION (PAINM) - 723.1/M54.2, 722.91/M50.90, 722.4/M50.30 - 01/22/2021   refer to AGUS Londono to discuss CESB and cervical RFA  · Medications  o Medications have been Reconciled  o Transition of Care or Provider Policy  · Instructions  o Encouraged to follow-up with Primary Care Provider for preventative care.  o The ROS and the PFSH were reviewed at today's visit.  o Call or return if symptoms worsen or persist.  o Surgery not recommended. I will refer her for CESB and discuss RFA.             Electronically Signed by: Daniela Vidal PA-C -Author on January 22, 2021 11:28:29 AM

## 2021-05-14 VITALS — BODY MASS INDEX: 25.35 KG/M2 | TEMPERATURE: 96.9 F | WEIGHT: 148.5 LBS | HEIGHT: 64 IN

## 2021-05-18 NOTE — PROGRESS NOTES
Susan Cuauhtemoc S  1945     Office/Outpatient Visit    Visit Date: Thu, Oct 22, 2020 01:13 pm    Provider: Waylon Tang MD (Assistant: Joya Deleon RN)    Location: [facility]        Electronically signed by Waylon Tang MD on  10/23/2020 05:05:28 AM                             Subjective:        CC: neck pain, tension headaches    HPI:       Cuauhtemoc is in today for evaluation of neck pain.  She says that she has been having some issues for the last 6 months or so.  It was pretty bad in the last few days.  She is not aware of specific injury, but she does have some history of previous surgery in the L neck.  She has noted some tension type headache associated with this.  She does deal with fairly constant stress.            Additionally, she presents with history of mixed hyperlipidemia.  current treatment includes Zocor.  Compliance with treatment has been good; she takes her medication as directed and follows up as directed.  She denies experiencing any hypercholesterolemia related symptoms.            Concerning essential (primary) hypertension, her current cardiac medication regimen includes a combination medication ( Zestoretic ).  She is tolerating the medication well without side effects.            Cuauhtemoc is also in today for follow up on anxiety.  This is a chronic issue for her for which she takes lorazepam currently.  She always has lots of stressors related to her family and does not feel that we could make any change at this time.  She also remains on generic Zoloft and tolerates it.  Risks are discussed with her again today.  Issac is okay.      ROS:     CONSTITUTIONAL:  Negative for chills and fever.      CARDIOVASCULAR:  Negative for chest pain and palpitations.      RESPIRATORY:  Negative for recent cough and dyspnea.      GASTROINTESTINAL:  Negative for abdominal pain, nausea and vomiting.      INTEGUMENTARY/BREAST:  Negative for atypical mole(s) and rash.           Past Medical History / Family History / Social History:         Last Reviewed on 12/13/2019 08:47 AM by Waylon Tang    Past Medical History:                 PAST MEDICAL HISTORY         Hyperlipidemia    Hypertension     Anxiety             ADVANCED DIRECTIVES: None - Her , Lex, would make decisions for her if needed.         PREVENTIVE HEALTH MAINTENANCE             BONE DENSITY: was last done 11/18/19 with the following abnormality noted-- Osteopenia     COLORECTAL CANCER SCREENING: Up to date (colonoscopy q10y; sigmoidoscopy q5y; Cologuard q3y) was last done 6/20/12, Results are in chart; colonoscopy with normal results     MAMMOGRAM: Done within last 2 years and results in are chart was last done 11/18/19 with normal results         Surgical History:         Appendectomy    Hysterectomy: partial;     carpal tunnel;    thoracic outlet - L;         Family History:     Father: Coronary Artery Disease     Mother: Breast Cancer         Social History:         Marital Status:      Children: 2 children         Tobacco/Alcohol/Supplements:     Last Reviewed on 6/25/2020 02:31 PM by Joya Deleon    Tobacco: She has never smoked.  Non-drinker         Substance Abuse History:     Last Reviewed on 12/13/2019 08:47 AM by Waylon Tang        Mental Health History:     Last Reviewed on 12/13/2019 08:47 AM by Waylon Tang        Communicable Diseases (eg STDs):     Last Reviewed on 12/13/2019 08:47 AM by Waylon Tang        Current Problems:     Last Reviewed on 12/13/2019 08:47 AM by Waylon Tang    Mixed hyperlipidemia    Essential (primary) hypertension    Other mixed anxiety disorders    Other long term (current) drug therapy    Dysuria    Encounter for immunization        Immunizations:     influenza, high-dose, quadrivalent (FLUZONE HIGH-DOSE QUAD 2020-21) 9/23/2020    Fluzone vs. High Dose Fluzone 10/21/2012    zzPrevnar-13 8/12/2015    Fluzone (3 +  years dose) 10/31/2007    Fluzone (3 + years dose) 10/21/2008    Fluzone (3 + years dose) 9/23/2009    Fluzone (3 + years dose) 9/17/2010    Fluzone (3 + years dose) 10/27/2011    Fluzone High-Dose pf (>=65 yr) 10/10/2013    Fluzone High-Dose pf (>=65 yr) 10/21/2012    Fluzone High-Dose pf (>=65 yr) 10/10/2014    Fluzone High-Dose pf (>=65 yr) 10/15/2015    Fluzone High-Dose pf (>=65 yr) 9/16/2016    Fluzone High-Dose pf (>=65 yr) 10/2/2017    Fluzone High-Dose pf (>=65 yr) 11/21/2018    Fluzone High-Dose pf (>=65 yr) 10/22/2019    Pneumococcal, 23-valent IM/SC (adult and pt >=2yr) 8/17/2012        Allergies:     Last Reviewed on 6/25/2020 02:33 PM by Joya Deleon    lisinopril-hydrochlorothiazide:   (Adverse Reaction)    Penicillins: hives         Current Medications:     Last Reviewed on 10/22/2020 01:16 PM by Joya Deleon    amitriptyline 100 mg oral tablet [Take 1 tablet(s) by mouth at bedtime]    Simvastatin 20 mg oral tablet [Take 1 tablet(s) by mouth at bedtime]    Zoloft 100 mg oral tablet [Take 1 tablet(s) by mouth daily]    LORazepam 0.5 mg oral tablet [TAKE 1/2 TO 1 TABLET BY MOUTH EVERY 12 HOURS AS NEEDED]    Fosamax 70 mg oral tablet [Take 1 tablet(s) by mouth q week]    Fluticasone Propionate     losartan-hydrochlorothiazide 100-25 mg oral tablet [take 1/2 tablet by oral route once daily]        Objective:        Vitals:         Current: 10/22/2020 1:18:53 PM    Ht:  5 ft, 5 in;  Wt: 147.4 lbs;  BMI: 24.5T: 96.8 F (temporal);  BP: 157/82 mm Hg (right arm, sitting);  P: 72 bpm (right arm (BP Cuff), sitting);  sCr: 1.3 mg/dL;  GFR: 38.66        Repeat:     2:0:21 PM  BP:   143/77mm Hg (right arm, sitting, pulse-71)     Exams:     PHYSICAL EXAM:     GENERAL: vital signs recorded - well developed, well nourished;  no apparent distress;     EYES: extraocular movements intact; conjunctiva and cornea are normal; PERRL;     E/N/T: EARS:  normal external auditory canals and tympanic membranes;  grossly  normal hearing;     NECK: range of motion is decreased with rotation toward the left;  thyroid is non-palpable;     RESPIRATORY: normal respiratory rate and pattern with no distress; normal breath sounds with no rales, rhonchi, wheezes or rubs;     CARDIOVASCULAR: normal rate; rhythm is regular;  no systolic murmur; no edema;     GASTROINTESTINAL: nontender; normal bowel sounds; no organomegaly;     LYMPHATIC: no enlargement of cervical or facial nodes; no supraclavicular nodes;     BREAST/INTEGUMENT: SKIN: no significant rashes or lesions; no suspicious moles;     NEUROLOGIC: mental status: alert and oriented x 3; cranial nerves II-XII grossly intact;     PSYCHIATRIC: appropriate affect and demeanor; normal psychomotor function;         Assessment:         M54.2   Cervicalgia       R51.9   Headache, unspecified       E78.2   Mixed hyperlipidemia       I10   Essential (primary) hypertension       F41.3   Other mixed anxiety disorders           ORDERS:         Meds Prescribed:       [Refilled] Fosamax 70 mg oral tablet [Take 1 tablet(s) by mouth q week], #13 (thirteen) tablets, Refills: 3 (three)       [Refilled] amitriptyline 100 mg oral tablet [Take 1 tablet(s) by mouth at bedtime], #90 (ninety) tablets, Refills: 1 (one)       [Refilled] Simvastatin 20 mg oral tablet [Take 1 tablet(s) by mouth at bedtime], #90 (ninety) tablets, Refills: 1 (one)       [Refilled] losartan-hydrochlorothiazide 100-25 mg oral tablet [take 1/2 tablet by oral route once daily], #45 (forty five) tablets, Refills: 1 (one)       [Refilled] Zoloft 100 mg oral tablet [Take 1 tablet(s) by mouth daily], #90 (ninety) tablets, Refills: 0 (zero)         Radiology/Test Orders:       [id number]  Radiologic examination, spine, cervical; minimum of four views  (Send-Out)            3017F  Colorectal CA screen results documented and reviewed (PV)  (In-House)              Lab Orders:       [id number]  CK - [facility]- CK total  (Send-Out)            56655  [facility]  Lipid Panel  (Send-Out)            07492  TSH - [facility] TSH  (Send-Out)              Other Orders:         Depression screen negative  (In-House)            1100F  Pt screen for fall risk; document 2+ falls in the past yr or any fall w/injury in past year (STUART)  (In-House)              Screening mammogram results documented  (Send-Out)            1124F  Advance Care Planning discussed and doc in MR; no surrogate named or advance care plan provided  (Send-Out)                      Plan:         Cervicalgia        RADIOLOGY:  I have ordered a C-Spine x-ray series to be done today.      RECOMMENDATIONS given include: Today, we have reviewed Cuauhtemoc's care in detail.  We will evaluate her neck further as noted below.  Consider physical therapy evaluation.  We will also refill her usual medications including lorazepam.  There is not an opportunity for dose reduction presently.  We will follow closely moving forward..  [facility] PHQ-9 Depression Screening: Completed form scanned and in chart; Total Score 7; Negative Depression Screen           Orders:       [id number]  Radiologic examination, spine, cervical; minimum of four views  (Send-Out)              Depression screen negative  (In-House)            1100F  Pt screen for fall risk; document 2+ falls in the past yr or any fall w/injury in past year (STUART)  (In-House)              Screening mammogram results documented  (Send-Out)            3017F  Colorectal CA screen results documented and reviewed (PV)  (In-House)            1124F  Advance Care Planning discussed and doc in MR; no surrogate named or advance care plan provided  (Send-Out)              Headache, unspecifiedAs above.        Mixed hyperlipidemiaAs above.    LABORATORY:  Labs ordered to be performed today include CK, total, lipid panel, and TSH.            Prescriptions:       [Refilled] Fosamax 70 mg oral tablet [Take 1 tablet(s) by mouth q week], #13 (thirteen) tablets, Refills: 3 (three)        [Refilled] amitriptyline 100 mg oral tablet [Take 1 tablet(s) by mouth at bedtime], #90 (ninety) tablets, Refills: 1 (one)       [Refilled] Simvastatin 20 mg oral tablet [Take 1 tablet(s) by mouth at bedtime], #90 (ninety) tablets, Refills: 1 (one)       [Refilled] losartan-hydrochlorothiazide 100-25 mg oral tablet [take 1/2 tablet by oral route once daily], #45 (forty five) tablets, Refills: 1 (one)       [Refilled] Zoloft 100 mg oral tablet [Take 1 tablet(s) by mouth daily], #90 (ninety) tablets, Refills: 0 (zero)           Orders:       [id number]  CK - [facility]- CK total  (Send-Out)            36724  LPDP - [facility] Lipid Panel  (Send-Out)            25736  TSH - [facility] TSH  (Send-Out)              Essential (primary) hypertensionAs above.        Other mixed anxiety disordersAs above.            Charge Capture:         Primary Diagnosis:     M54.2  Cervicalgia           Orders:      [id number]  Office/outpatient visit; established patient, level 4  (In-House)              Depression screen negative  (In-House)            1100F  Pt screen for fall risk; document 2+ falls in the past yr or any fall w/injury in past year (STUART)  (In-House)            3017F  Colorectal CA screen results documented and reviewed (PV)  (In-House)              R51.9  Headache, unspecified     E78.2  Mixed hyperlipidemia     I10  Essential (primary) hypertension     F41.3  Other mixed anxiety disorders

## 2021-05-18 NOTE — PROGRESS NOTES
Cuauhtemoc Davis 1945     Office/Outpatient Visit    Visit Date: Thu, Mar 28, 2019 01:43 pm    Provider: Waylon Tang MD (Assistant: Joya Deleon RN)    Location: [facility]        Electronically signed by Waylon Tang MD on  03/29/2019 07:32:09 AM                             SUBJECTIVE:        CC: cough         HPI:         Patient to be evaluated for cough.  It has been present for the past several days.  Respiratory symptoms include cough.  Other symptoms include fever, nasal congestion and nasal discharge.  Sputum is described as scant.  She has already tried to relieve the symptoms with cetirizine.  Medical history is significant for moderate seasonal allergies.      ROS:     CONSTITUTIONAL:  Negative for chills and fever.      CARDIOVASCULAR:  Negative for chest pain and palpitations.      GASTROINTESTINAL:  Negative for abdominal pain, nausea and vomiting.      INTEGUMENTARY/BREAST:  Negative for atypical mole(s) and rash.          [facility]/:     Last Reviewed on 3/28/2019 02:00 PM by Waylon Tang    Past Medical History:                 PAST MEDICAL HISTORY             PREVENTIVE HEALTH MAINTENANCE             COLORECTAL CANCER SCREENING: Up to date (colonoscopy q10y; sigmoidoscopy q5y; Cologuard q3y) was last done 05/2012, Results are in chart; colonoscopy with normal results     MAMMOGRAM: Done within last 2 years and results in are chart was last done 10/2018 with normal results         Surgical History:         Appendectomy    Hysterectomy: partial;      carpal tunnel;    thoracic outlet - L;         Family History:         Positive for Coronary Artery Disease ( father ).      Positive for Breast Cancer ( mother; mat. aunt ).          Social History:         Marital Status:      Children: 2 children         Tobacco/Alcohol/Supplements:     Last Reviewed on 3/28/2019 02:00 PM by Waylon Tang    Tobacco: She has never smoked.  Non-drinker          Substance Abuse History:     Last Reviewed on 3/28/2019 02:00 PM by Waylon Tang        Mental Health History:     Last Reviewed on 3/28/2019 02:00 PM by Waylon Tang        Communicable Diseases (eg STDs):     Last Reviewed on 3/28/2019 02:00 PM by Waylon Tang            Current Problems:     Last Reviewed on 3/28/2019 02:00 PM by Waylon Tang    Common migraine     Use of high risk medications     Anxiety     Hypertension     Hypercholesterolemia     Cough     Dysuria         Immunizations:     Fluzone vs. High Dose Fluzone 10/21/2012     zzPrevnar-13 8/12/2015     Fluzone (3 + years dose) 10/31/2007     Fluzone (3 + years dose) 10/21/2008     Fluzone (3 + years dose) 9/23/2009     Fluzone (3 + years dose) 9/17/2010     Fluzone (3 + years dose) 10/27/2011     Fluzone High-Dose pf (>=65 yr) 10/10/2013     Fluzone High-Dose pf (>=65 yr) 10/21/2012     Fluzone High-Dose pf (>=65 yr) 10/10/2014     Fluzone High-Dose pf (>=65 yr) 10/15/2015     Fluzone High-Dose pf (>=65 yr) 9/16/2016     Fluzone High-Dose pf (>=65 yr) 10/2/2017     Fluzone High-Dose pf (>=65 yr) 11/21/2018     Pneumococcal, 23-valent IM/SC (adult and pt >=2yr) 8/17/2012         Allergies:     Last Reviewed on 3/28/2019 02:00 PM by Waylon Tang    Penicillins: hives        Current Medications:     Last Reviewed on 3/28/2019 02:00 PM by Waylon Tang    Amitriptyline HCl 100mg Tablet Take 1 tablet(s) by mouth at bedtime     Fosamax 70mg Tablet Take 1 tablet(s) by mouth q week     Lisinopril/Hydrochlorothiazide 10mg/12.5mg Tablet Take 1 tablet(s) by mouth daily     Lorazepam 0.5mg Tablet take 1/2 to 1 tablet every 12 hours as needed for anxiety     Simvastatin 20mg Tablet Take 1 tablet(s) by mouth at bedtime     Zoloft 100mg Tablet Take 1 tablet(s) by mouth daily     Fluticasone Propionate         OBJECTIVE:        Vitals:         Current: 3/28/2019 1:47:33 PM    Ht:  5 ft, 5 in;  Wt: 151.8 lbs;   BMI: 25.3    T: 98.3 F (oral);  BP: 99/67 mm Hg (right arm, sitting);  P: 108 bpm (right arm (BP Cuff), sitting);  sCr: 1.67 mg/dL;  GFR: 31.35        Exams:     PHYSICAL EXAM:     GENERAL: vital signs recorded - well developed, well nourished;  no apparent distress;     EYES: extraocular movements intact; conjunctiva and cornea are normal; PERRLA;     E/N/T: EARS:  normal external auditory canals and tympanic membranes;  grossly normal hearing; OROPHARYNX:  normal mucosa, dentition, gingiva, and posterior pharynx;     NECK: range of motion is normal; thyroid is non-palpable;     RESPIRATORY: normal respiratory rate and pattern with no distress; normal breath sounds with no rales, rhonchi, wheezes or rubs;     CARDIOVASCULAR: normal rate; rhythm is regular;  no systolic murmur; no edema;     GASTROINTESTINAL: nontender; normal bowel sounds; no organomegaly;     BREAST/INTEGUMENT: SKIN: no significant rashes or lesions; no suspicious moles;         Procedures:     Cough     1. Kenalog 60 mg given IM in the right hip; administered by [facility];  lot number [id number]; expires 10/20             ASSESSMENT           786.2   R05  Cough              DDx:         ORDERS:         Meds Prescribed:       Promethazine with Dextromethrophan 6.25mg/15mg per 5ml Syrup Take 1 to 2 teaspoon by mouth q 4 to 6 hr as needed for cough  #4 (Four) oz Refills: 1       Benzonatate 200mg Capsules Take 1 capsule(s) by mouth tid prn  #24 (Twenty Four) capsule(s) Refills: 0         Radiology/Test Orders:       3014F  Screening mammography results documented and reviewed (PV)1  (In-House)         3017F  Colorectal CA screen results documented and reviewed (PV)  (In-House)           Other Orders:       [id number]  Therapeutic injection  (In-House)           Calculated BMI above the upper parameter and a follow-up plan was documented in the medical record  (In-House)           Kenalog, per 10 mg (x6)                 PLAN:          Cough     We  will cover Cuauhtemoc for presumed allergy flaring as noted.  If she does not see improvement, she will let me know.     Steroids Kenalog 60 mg 1.5 ml MIPS     MAMMOGRAM: Done within last 2 years and results in are chart     COLORECTAL CANCER SCREENING: Results are in chart     BMI Elevated - Follow-Up Plan: She was provided education on weight loss strategies           Prescriptions:       Promethazine with Dextromethrophan 6.25mg/15mg per 5ml Syrup Take 1 to 2 teaspoon by mouth q 4 to 6 hr as needed for cough  #4 (Four) oz Refills: 1       Benzonatate 200mg Capsules Take 1 capsule(s) by mouth tid prn  #24 (Twenty Four) capsule(s) Refills: 0           Orders:       [id number]  Therapeutic injection  (In-House)                     Kenalog, per 10 mg (x6)       3014F  Screening mammography results documented and reviewed (PV)1  (In-House)         3017F  Colorectal CA screen results documented and reviewed (PV)  (In-House)           Calculated BMI above the upper parameter and a follow-up plan was documented in the medical record  (In-House)             Patient Education Handouts:       Allergies              CHARGE CAPTURE           **Please note: ICD descriptions below are intended for billing purposes only and may not represent clinical diagnoses**        Primary Diagnosis:         786.2 Cough            R05    Cough              Orders:          [id number]   Office/outpatient visit; established patient, level 3  (In-House)             45890   Therapeutic injection  (In-House)                                           Kenalog, per 10 mg (x6)           3014F   Screening mammography results documented and reviewed (PV)1  (In-House)             3017F   Colorectal CA screen results documented and reviewed (PV)  (In-House)                Calculated BMI above the upper parameter and a follow-up plan was documented in the medical record  (In-House)

## 2021-05-18 NOTE — PROGRESS NOTES
SusanCuauhtemoc S. 1945     Office/Outpatient Visit    Visit Date: Thu, Jul 19, 2018 01:32 pm    Provider: Waylon Tang MD (Assistant: Susan Murillo MA)    Location: [facility]        Electronically signed by Waylon Tang MD on  07/20/2018 05:37:38 PM                             SUBJECTIVE:        CC: earache, anxiety         HPI:     Cuauhtemoc is in today for follow up on earache.  She says that her L ear has been stopped up, but she is having some ringing in the ear.  She did use some peroxide, but she did not get anything from the ear and did not see relief.  She is without any other acute issue.         Cuauhtemoc is also in today for follow up on anxiety.  This has been a long term issue for her for which she uses lorazepam very sparingly.  She last had this filled in October 2017 and does need a refill today.  She also remains on generic Zoloft and tolerates it.  Most of the time she does well.  Risks are discussed with her again today.     ROS:     CONSTITUTIONAL:  Negative for chills and fever.      CARDIOVASCULAR:  Negative for chest pain and palpitations.      RESPIRATORY:  Negative for recent cough and dyspnea.      GASTROINTESTINAL:  Negative for abdominal pain, nausea and vomiting.      INTEGUMENTARY/BREAST:  Negative for atypical mole(s) and rash.          [facility]/:     Last Reviewed on 5/04/2018 10:14 AM by Waylon Tang    Past Medical History:                 PAST MEDICAL HISTORY             PREVENTIVE HEALTH MAINTENANCE             COLORECTAL CANCER SCREENING: Up to date (colonoscopy q10y; sigmoidoscopy q5y; Cologuard q3y) was last done 05/2012, Results are in chart; colonoscopy with normal results     MAMMOGRAM: Done within last 2 years and results in are chart was last done 10/2017 with normal results         Surgical History:         Appendectomy    Hysterectomy: partial;      carpal tunnel;    thoracic outlet - L;         Family History:         Positive for Coronary  Artery Disease ( father ).      Positive for Breast Cancer ( mother; mat. aunt ).          Social History:         Marital Status:      Children: 2 children         Tobacco/Alcohol/Supplements:     Last Reviewed on 5/04/2018 10:14 AM by Waylon Tang    Tobacco: She has never smoked.  Non-drinker         Substance Abuse History:     Last Reviewed on 5/04/2018 10:14 AM by Waylon Tang        Mental Health History:     Last Reviewed on 5/04/2018 10:14 AM by Waylon Tang        Communicable Diseases (eg STDs):     Last Reviewed on 5/04/2018 10:14 AM by Waylon Tang            Current Problems:     Last Reviewed on 5/04/2018 10:14 AM by Waylon Tang    Common migraine     Use of high risk medications     Anxiety     Hypertension     Hypercholesterolemia         Immunizations:     Fluzone vs. High Dose Fluzone 10/21/2012     zzPrevnar-13 8/12/2015     Fluzone (3 + years dose) 10/31/2007     Fluzone (3 + years dose) 10/21/2008     Fluzone (3 + years dose) 9/23/2009     Fluzone (3 + years dose) 9/17/2010     Fluzone (3 + years dose) 10/27/2011     Fluzone High-Dose pf (>=65 yr) 10/10/2013     Fluzone High-Dose pf (>=65 yr) 10/21/2012     Fluzone High-Dose pf (>=65 yr) 10/10/2014     Fluzone High-Dose pf (>=65 yr) 10/15/2015     Fluzone High-Dose pf (>=65 yr) 9/16/2016     Fluzone High-Dose pf (>=65 yr) 10/2/2017     Pneumococcal, 23-valent IM/SC (adult and pt >=2yr) 8/17/2012         Allergies:     Last Reviewed on 5/04/2018 10:14 AM by Waylon Tang    Penicillins: hives        Current Medications:     Last Reviewed on 5/04/2018 10:14 AM by Waylon Tang    Amitriptyline HCl 100mg Tablet Take 1 tablet(s) by mouth at bedtime     Lisinopril/Hydrochlorothiazide 10mg/12.5mg Tablet Take 1 tablet(s) by mouth daily     Simvastatin 20mg Tablet Take 1 tablet(s) by mouth at bedtime     Zoloft 100mg Tablet Take 1 tablet(s) by mouth daily     Fosamax 70mg  Tablet Take 1 tablet(s) by mouth q week     Lorazepam 0.5mg Tablet take 1/2 to 1 tablet every 12 hours as needed for anxiety     Fluticasone Propionate         OBJECTIVE:        Vitals:         Current: 7/19/2018 1:35:36 PM    Ht:  5 ft, 5 in;  Wt: 152 lbs;  BMI: 25.3    T: 97.9 F (oral);  BP: 119/84 mm Hg (left arm, sitting);  P: 96 bpm (left arm (BP Cuff), sitting);  sCr: 1.67 mg/dL;  GFR: 31.37        Exams:     PHYSICAL EXAM:     GENERAL: vital signs recorded - well developed, well nourished;  no apparent distress;     E/N/T: EARS: external auditory canal occluded by cerumen on the left;     NECK: range of motion is normal; thyroid is non-palpable;     RESPIRATORY: normal respiratory rate and pattern with no distress; normal breath sounds with no rales, rhonchi, wheezes or rubs;     CARDIOVASCULAR: normal rate; rhythm is regular;  no systolic murmur; no edema;     GASTROINTESTINAL: nontender; normal bowel sounds; no organomegaly;     BREAST/INTEGUMENT: SKIN: no significant rashes or lesions; no suspicious moles;     NEUROLOGICAL:  cranial nerves, motor and sensory function, reflexes, gait and coordination are all intact;     PSYCHIATRIC:  appropriate affect and demeanor; normal speech pattern; grossly normal memory;         Procedures:     Cerumen impaction         Cerumen/Wax removal: Cerumen impaction is noted in both ears The degree of wax accumulation is moderate in the left ear and right ear.  With syringe irrigation and ear currette, the wax is removed.  Removed from ear was hard balls of wax.  The patient tolerated the procedure well.  Complications were Minor bleeding in right ear.  Performed by:tls             ASSESSMENT           380.4   H61.22  Cerumen impaction              DDx:     V58.69   Z79.899  Use of high risk medications              DDx:     300.02   F41.3  Anxiety              DDx:         ORDERS:         Meds Prescribed:       Refill of: Lorazepam 0.5mg Tablet take 1/2 to 1 tablet every 12  hours as needed for anxiety  #60 (Sixty) tablet(s) Refills: 0         Procedures Ordered:       [id number]  Removal of impacted cerumen right ear (NURSE)  (In-House)           Other Orders:       [id number]  Removal of impacted cerumen left ear (NURSE)  (In-House)                   PLAN:          Cerumen impaction         RECOMMENDATIONS given include: We will try to get this wax out for Cuauhtemoc.  I am also going to refill lorazepam for her presently.  No other changes are anticipated..            Orders:       [id number]  Removal of impacted cerumen left ear (NURSE)  (In-House)         [id number]  Removal of impacted cerumen right ear (NURSE)  (In-House)             Patient Education Handouts:       [facility] Medication Compliance           Use of high risk medications As above.          Anxiety As above.           Prescriptions:       Refill of: Lorazepam 0.5mg Tablet take 1/2 to 1 tablet every 12 hours as needed for anxiety  #60 (Sixty) tablet(s) Refills: 0             CHARGE CAPTURE           **Please note: ICD descriptions below are intended for billing purposes only and may not represent clinical diagnoses**        Primary Diagnosis:         380.4 Cerumen impaction            H61.22    Impacted cerumen, left ear              Orders:          [id number]   Office/outpatient visit; established patient, level 3  (In-House)             [id number]   Removal of impacted cerumen left ear (NURSE)  (In-House)             [id number]   Removal of impacted cerumen right ear (NURSE)  (In-House)           V58.69 Use of high risk medications            Z79.899    Other long term (current) drug therapy    300.02 Anxiety            F41.3    Other mixed anxiety disorders

## 2021-05-18 NOTE — PROGRESS NOTES
SusanCuauhtemoc turner S. 1945     Office/Outpatient Visit    Visit Date: Thu, Marcell 3, 2019 11:43 am    Provider: Waylon Tang MD (Assistant: Raf Beckham)    Location: [facility]        Electronically signed by Waylon Tang MD on  01/04/2019 04:43:40 AM                             SUBJECTIVE:        CC: blood pressure, cholesterol, anxiety         HPI:         Patient presents with hypertension.  Her current cardiac medication regimen includes a combination medication ( Zestoretic ).  She is tolerating the medication well without side effects.          Dx with hypercholesterolemia; current treatment includes Zocor.  Compliance with treatment has been good; she takes her medication as directed and follows up as directed.  She denies experiencing any hypercholesterolemia related symptoms.          Cuauhtemoc is also in today for follow up on anxiety.  This has been a chronic issue for her for which she uses lorazepam very sparingly.  Her last refill on that was in mid 2018.  She would like to continue this presently.  She also remains on generic Zoloft and tolerates it.  Risks are discussed with her again today.  Issac is okay.     ROS:     CONSTITUTIONAL:  Negative for chills and fever.      CARDIOVASCULAR:  Negative for chest pain and palpitations.      RESPIRATORY:  Negative for recent cough and dyspnea.      GASTROINTESTINAL:  Negative for abdominal pain, nausea and vomiting.      INTEGUMENTARY/BREAST:  Negative for atypical mole(s) and rash.          PMH/FMH/SH:     Last Reviewed on 1/03/2019 12:06 PM by Waylon Tang    Past Medical History:                 PAST MEDICAL HISTORY             PREVENTIVE HEALTH MAINTENANCE             COLORECTAL CANCER SCREENING: Up to date (colonoscopy q10y; sigmoidoscopy q5y; Cologuard q3y) was last done 05/2012, Results are in chart; colonoscopy with normal results     MAMMOGRAM: Done within last 2 years and results in are chart was last done 10/2018 with  normal results         Surgical History:         Appendectomy    Hysterectomy: partial;      carpal tunnel;    thoracic outlet - L;         Family History:         Positive for Coronary Artery Disease ( father ).      Positive for Breast Cancer ( mother; mat. aunt ).          Social History:         Marital Status:      Children: 2 children         Tobacco/Alcohol/Supplements:     Last Reviewed on 1/03/2019 12:06 PM by Waylon Tang    Tobacco: She has never smoked.  Non-drinker         Substance Abuse History:     Last Reviewed on 1/03/2019 12:06 PM by Waylon Tang        Mental Health History:     Last Reviewed on 1/03/2019 12:06 PM by Waylon Tang        Communicable Diseases (eg STDs):     Last Reviewed on 1/03/2019 12:06 PM by Waylon Tang            Current Problems:     Last Reviewed on 1/03/2019 12:06 PM by Waylon Tang    Common migraine     Use of high risk medications     Anxiety     Hypertension     Hypercholesterolemia     Dysuria         Immunizations:     Fluzone vs. High Dose Fluzone 10/21/2012     [id number] 8/12/2015     Fluzone (3 + years dose) 10/31/2007     Fluzone (3 + years dose) 10/21/2008     Fluzone (3 + years dose) 9/23/2009     Fluzone (3 + years dose) 9/17/2010     Fluzone (3 + years dose) 10/27/2011     Fluzone High-Dose pf (>=65 yr) 10/10/2013     Fluzone High-Dose pf (>=65 yr) 10/21/2012     Fluzone High-Dose pf (>=65 yr) 10/10/2014     Fluzone High-Dose pf (>=65 yr) 10/15/2015     Fluzone High-Dose pf (>=65 yr) 9/16/2016     Fluzone High-Dose pf (>=65 yr) 10/2/2017     Fluzone High-Dose pf (>=65 yr) 11/21/2018     Pneumococcal, 23-valent IM/SC (adult and pt >=2yr) 8/17/2012         Allergies:     Last Reviewed on 1/03/2019 12:06 PM by Waylon Tang    Penicillins: hives        Current Medications:     Last Reviewed on 1/03/2019 12:06 PM by Waylon Tang    Amitriptyline HCl 100mg Tablet Take 1 tablet(s) by  mouth at bedtime     Lisinopril/Hydrochlorothiazide 10mg/12.5mg Tablet Take 1 tablet(s) by mouth daily     Simvastatin 20mg Tablet Take 1 tablet(s) by mouth at bedtime     Zoloft 100mg Tablet Take 1 tablet(s) by mouth daily     Lorazepam 0.5mg Tablet take 1/2 to 1 tablet every 12 hours as needed for anxiety     Fosamax 70mg Tablet Take 1 tablet(s) by mouth q week     Fluticasone Propionate         OBJECTIVE:        Vitals:         Current: 1/3/2019 11:50:29 AM    Ht:  5 ft, 5 in;  Wt: 151.6 lbs;  BMI: 25.2    T: 98 F (oral);  BP: 123/77 mm Hg (right arm, sitting);  P: 105 bpm (right arm (BP Cuff), sitting);  sCr: 1.67 mg/dL;  GFR: 31.33        Exams:     PHYSICAL EXAM:     GENERAL: vital signs recorded - well developed, well nourished;  no apparent distress;     EYES: extraocular movements intact; conjunctiva and cornea are normal; PERRLA;     E/N/T:  normal EACs, TMs, nasal/oral mucosa, teeth, gingiva, and oropharynx;     NECK: range of motion is normal; thyroid is non-palpable;     RESPIRATORY: normal respiratory rate and pattern with no distress; normal breath sounds with no rales, rhonchi, wheezes or rubs;     CARDIOVASCULAR: normal rate; rhythm is regular;  no systolic murmur; no edema;     GASTROINTESTINAL: nontender; normal bowel sounds; no organomegaly;     BREAST/INTEGUMENT: SKIN: no significant rashes or lesions; no suspicious moles;     PSYCHIATRIC: appropriate affect and demeanor;         ASSESSMENT           401.1   I10  Hypertension              DDx:     272.0   E78.2  Hypercholesterolemia              DDx:     V58.69   Z79.899  Use of high risk medications              DDx:     300.02   F41.3  Anxiety              DDx:     Dysuria    788.1   R30.0  Dysuria              DDx:         ORDERS:         Meds Prescribed:       Refill of: Amitriptyline HCl 100mg Tablet Take 1 tablet(s) by mouth at bedtime  #90 (Ninety) tablet(s) Refills: 1       Refill of: Lisinopril/Hydrochlorothiazide 10mg/12.5mg Tablet  Take 1 tablet(s) by mouth daily  #90 (Ninety) tablet(s) Refills: 1       Refill of: Simvastatin 20mg Tablet Take 1 tablet(s) by mouth at bedtime  #90 (Ninety) tablet(s) Refills: 1       Refill of: Zoloft (Sertraline HCl) 100mg Tablet Take 1 tablet(s) by mouth daily  #90 (Ninety) tablet(s) Refills: 1       Refill of: Lorazepam 0.5mg Tablet take 1/2 to 1 tablet every 12 hours as needed for anxiety  #30 (Thirty) tablet(s) Refills: 1       Refill of: Fosamax (Alendronate Sodium) 70mg Tablet Take 1 tablet(s) by mouth q week  #13 (Thirteen) tablet(s) Refills: 3         Radiology/Test Orders:       3014F  Screening mammography results documented and reviewed (PV)1  (In-House)         3017F  Colorectal CA screen results documented and reviewed (PV)  (In-House)           Lab Orders:       [id number]  BDUAM - [facility] Urinalysis, automated, with micro  (Send-Out)         [id number]  URCU - [facility] Urine Culture  (Send-Out)                   PLAN:          Hypertension         RECOMMENDATIONS given include: Cuauhtemoc is doing well at this time.  We have reviewed her care and will get updated labs.  Her recent testing from nephrology is reviewed.  We will refill the lorazepam at a lower quantity given her infrequent use of this.  We will follow closely moving forward..  MIPS Vaccines Flu and Pneumonia updated in Shot record     MAMMOGRAM: Done within last 2 years and results in are chart     COLORECTAL CANCER SCREENING: Results are in chart           Prescriptions:       Refill of: Lisinopril/Hydrochlorothiazide 10mg/12.5mg Tablet Take 1 tablet(s) by mouth daily  #90 (Ninety) tablet(s) Refills: 1           Orders:       3014F  Screening mammography results documented and reviewed (PV)1  (In-House)         3017F  Colorectal CA screen results documented and reviewed (PV)  (In-House)             Patient Education Handouts:       High Blood Pressure (HTN)           Hypercholesterolemia           Prescriptions:       Refill of: Simvastatin 20mg Tablet  Take 1 tablet(s) by mouth at bedtime  #90 (Ninety) tablet(s) Refills: 1          Anxiety           Prescriptions:       Refill of: Zoloft (Sertraline HCl) 100mg Tablet Take 1 tablet(s) by mouth daily  #90 (Ninety) tablet(s) Refills: 1       Refill of: Lorazepam 0.5mg Tablet take 1/2 to 1 tablet every 12 hours as needed for anxiety  #30 (Thirty) tablet(s) Refills: 1          Dysuria     LABORATORY:  Labs ordered to be performed today include Urine culture.            Orders:       [id number]  BDUAM - [facility] Urinalysis, automated, with micro  (Send-Out)         [id number]  URCU - [facility] Urine Culture  (Send-Out)               Other Prescriptions:       Refill of: Amitriptyline HCl 100mg Tablet Take 1 tablet(s) by mouth at bedtime  #90 (Ninety) tablet(s) Refills: 1       Refill of: Fosamax (Alendronate Sodium) 70mg Tablet Take 1 tablet(s) by mouth q week  #13 (Thirteen) tablet(s) Refills: 3         CHARGE CAPTURE           **Please note: ICD descriptions below are intended for billing purposes only and may not represent clinical diagnoses**        Primary Diagnosis:         401.1 Hypertension            I10    Essential (primary) hypertension              Orders:          [id number]   Office/outpatient visit; established patient, level 4  (In-House)             3014F   Screening mammography results documented and reviewed (PV)1  (In-House)             3017F   Colorectal CA screen results documented and reviewed (PV)  (In-House)           272.0 Hypercholesterolemia            E78.2    Mixed hyperlipidemia    V58.69 Use of high risk medications            Z79.899    Other long term (current) drug therapy    300.02 Anxiety            F41.3    Other mixed anxiety disorders    Dysuria    788.1 Dysuria            R30.0    Dysuria

## 2021-05-18 NOTE — PROGRESS NOTES
Cuauhtemoc Davis  1945     Office/Outpatient Visit    Visit Date: Fri, Apr 9, 2021 11:29 am    Provider: Waylon Tang MD (Assistant: Yohana Schulte MA)    Location: [facility]        Electronically signed by Waylon Tang MD on  04/10/2021 05:29:52 PM                             Subjective:        CC: physical exam, anxiety, blood pressure, cholesterol    HPI:           Mrs. Davis is here for a Medicare wellness visit.  The required HRA questions are integrated within this visit note. Family medical history and individual medical/surgical history were reviewed and updated.  A current height, weight, BMI, blood pressure, and pulse were recorded in the vitals section of the note and have been reviewed. Patient's medications, including supplements, were recorded in the chart and reviewed.  Current providers and suppliers were reviewed and updated.          Self-Assessment of Health: She rates her health as very good. She rates her confidence of being able to control/manage most of her health problems as somewhat confident. Her physical/emotional health has limited her social activites quite a bit.  A review of cognitive impairment was performed, including ability to drive a car, manage finances, and any memory changes, and was found to be negative.  A review of functional ability, including bathing, dressing, walking, and urine/bowel continence as well as level of safety was performed and was found to be negative.  Falls Risk: Has not had any falls or only one fall without injury in the past year.  She denies having trouble hearing the TV/radio when others do not, having to strain to hear or understand conversations and wearing hearing aid(s).  Concerning home safety, she reports that at home she DOES have adequate lighting, a skid resistant shower/tub, handrails on stairs, functioning smoke alarms and absence of throw rugs, but not grab bars in the bath.          Immunization Status:  ** >10 years since last Td booster; Physical Activity: She exercises but less than 20 minutes 3 days per week; Type of diet patient normally eats is described as low fat Tobacco: She has never smoked.  Preventative Health updated today           Mixed hyperlipidemia details; current treatment includes Zocor.  Compliance with treatment has been good; she takes her medication as directed and follows up as directed.  She denies experiencing any hypercholesterolemia related symptoms.            Dx with essential (primary) hypertension; her current cardiac medication regimen includes a combination medication ( Zestoretic ).  She is tolerating the medication well without side effects.            Cuauhtemoc is also being seen for follow up on anxiety.  This is a chronic issue for her for which she takes lorazepam currently.  She continues to have significant stressors related to her family and does not feel that we could make any change at this time.  She also remains on sertraline and tolerates it.  Risks are discussed with her again today.  Issac is okay.      ROS:     CONSTITUTIONAL:  Negative for chills and fever.      CARDIOVASCULAR:  Negative for chest pain and palpitations.      RESPIRATORY:  Negative for recent cough and dyspnea.      GASTROINTESTINAL:  Negative for abdominal pain, nausea and vomiting.      INTEGUMENTARY/BREAST:  Negative for atypical mole(s) and rash.          Past Medical History / Family History / Social History:         Last Reviewed on 4/09/2021 11:54 AM by Waylon Tang    Past Medical History:                 PAST MEDICAL HISTORY         Hyperlipidemia    Hypertension     Anxiety             ADVANCED DIRECTIVES: None - Her , Lex, would make decisions for her if needed.         PREVENTIVE HEALTH MAINTENANCE             BONE DENSITY: was last done 11/18/19 with the following abnormality noted-- Osteopenia     COLORECTAL CANCER SCREENING: Up to date (colonoscopy q10y;  sigmoidoscopy q5y; Cologuard q3y) was last done 6/20/12, Results are in chart; colonoscopy with normal results     MAMMOGRAM: Done within last 2 years and results in are chart was last done 11/18/19 with normal results         Surgical History:         Appendectomy    Hysterectomy: partial;     carpal tunnel;    thoracic outlet - L;         Family History:     Father: Coronary Artery Disease     Mother: Breast Cancer         Social History:         Marital Status:      Children: 2 children         Tobacco/Alcohol/Supplements:     Last Reviewed on 4/09/2021 11:54 AM by Waylon Tang    Tobacco: She has never smoked.  Non-drinker         Substance Abuse History:     Last Reviewed on 4/09/2021 11:54 AM by Waylon Tang        Mental Health History:     Last Reviewed on 4/09/2021 11:54 AM by Waylon Tang        Communicable Diseases (eg STDs):     Last Reviewed on 4/09/2021 11:54 AM by Waylon Tang        Current Problems:     Last Reviewed on 4/09/2021 11:54 AM by Waylon Tang    Mixed hyperlipidemia    Essential (primary) hypertension    Other mixed anxiety disorders    Other long term (current) drug therapy    Dysuria    Encounter for immunization    Headache, unspecified    Cervicalgia    Encounter for other screening for malignant neoplasm of breast    Encounter for screening for depression    Encounter for general adult medical examination without abnormal findings        Immunizations:     influenza, high-dose, quadrivalent (FLUZONE HIGH-DOSE QUAD 2020-21) 9/23/2020    Fluzone vs. High Dose Fluzone 10/21/2012    zzPrevnar-13 8/12/2015    Fluzone (3 + years dose) 10/31/2007    Fluzone (3 + years dose) 10/21/2008    Fluzone (3 + years dose) 9/23/2009    Fluzone (3 + years dose) 9/17/2010    Fluzone (3 + years dose) 10/27/2011    Fluzone High-Dose pf (>=65 yr) 10/10/2013    Fluzone High-Dose pf (>=65 yr) 10/21/2012    Fluzone High-Dose pf (>=65 yr) 10/10/2014     Fluzone High-Dose pf (>=65 yr) 10/15/2015    Fluzone High-Dose pf (>=65 yr) 9/16/2016    Fluzone High-Dose pf (>=65 yr) 10/2/2017    Fluzone High-Dose pf (>=65 yr) 11/21/2018    Fluzone High-Dose pf (>=65 yr) 10/22/2019    Pneumococcal, 23-valent IM/SC (adult and pt >=2yr) 8/17/2012        Allergies:     Last Reviewed on 4/09/2021 11:54 AM by Waylon Tang    lisinopril-hydrochlorothiazide:   (Adverse Reaction)    Penicillins: hives         Current Medications:     Last Reviewed on 4/09/2021 11:54 AM by Waylon Tang    amitriptyline 100 mg oral tablet [Take 1 tablet(s) by mouth at bedtime]    Simvastatin 20 mg oral tablet [Take 1 tablet(s) by mouth at bedtime]    Zoloft 100 mg oral tablet [Take 1 tablet(s) by mouth daily]    LORazepam 0.5 mg oral tablet [TAKE 1/2 TO 1 TABLET BY MOUTH EVERY 12 HOURS AS NEEDED]    Fosamax 70 mg oral tablet [Take 1 tablet(s) by mouth q week]    Fluticasone Propionate     losartan-hydrochlorothiazide 100-25 mg oral tablet [take 1/2 tablet by oral route once daily]        Objective:        Vitals:         Current: 4/9/2021 11:34:33 AM    Ht:  5 ft, 5 in;  Wt: 149.4 lbs;  BMI: 24.9T: 98.5 F (oral);  BP: 114/74 mm Hg (left arm, sitting);  P: 100 bpm (left arm (BP Cuff), sitting);  sCr: 1.45 mg/dL;  GFR: 34.86        Exams:     PHYSICAL EXAM:     GENERAL: vital signs recorded - well developed, well nourished;  no apparent distress;     EYES: extraocular movements intact; conjunctiva and cornea are normal; PERRL; binocular vision is at least 20/30 - hearing is normal    E/N/T:  normal EACs, TMs, nasal/oral mucosa, teeth, gingiva, and oropharynx;     NECK: range of motion is normal; thyroid is non-palpable;     RESPIRATORY: normal respiratory rate and pattern with no distress; normal breath sounds with no rales, rhonchi, wheezes or rubs;     CARDIOVASCULAR: normal rate; rhythm is regular;  no systolic murmur; no edema;     GASTROINTESTINAL: nontender; normal bowel sounds;  no organomegaly;     LYMPHATIC: no enlargement of cervical or facial nodes; no supraclavicular nodes;     BREAST/INTEGUMENT: SKIN: no significant rashes or lesions; no suspicious moles;     NEUROLOGIC: mental status: alert and oriented x 3; cranial nerves II-XII grossly intact;     PSYCHIATRIC: appropriate affect and demeanor; normal psychomotor function;         Lab/Test Results:         THC Cannabinoids Screen, Urin: Negative (04/09/2021),     Buprenorphine: Negative (04/09/2021),     BAR-Barbiturates Screen, Urin: Negative (04/09/2021),     BZO-Benzodiazepines Screen,Ur: Positive (04/09/2021),     MTD-Methadone Screen, Urine: Negative (04/09/2021),     Amphetamines Screen, Urin: Negative (04/09/2021),     Opiate Screen, Urine: Negative (04/09/2021),     OXY-Oxycodone: Negative (04/09/2021),     MDMA-Ecstasy: Negative (04/09/2021),     Cocaine(Metab.)Screen, Ur: Negative (04/09/2021),     PCP-Phencyclidine Screen, Uri: Negative (04/09/2021),     Met-Methamphetamine: Negative (04/09/2021),     Urine temperature: confirmed (04/09/2021),     Date and time of last pill: lorazepam-4/9/21 @ 6am/steven (04/09/2021),     Performed by: [facility] (04/09/2021),     Collection Time: 1220 (04/09/2021),             Assessment:         Z00.00   Encounter for general adult medical examination without abnormal findings       E78.2   Mixed hyperlipidemia       I10   Essential (primary) hypertension       F41.3   Other mixed anxiety disorders       D64.9   Anemia, unspecified           ORDERS:         Meds Prescribed:       [Refilled] amitriptyline 100 mg oral tablet [Take 1 tablet(s) by mouth at bedtime], #90 (ninety) tablets, Refills: 1 (one)       [Refilled] Simvastatin 20 mg oral tablet [Take 1 tablet(s) by mouth at bedtime], #90 (ninety) tablets, Refills: 1 (one)       [Refilled] losartan-hydrochlorothiazide 100-25 mg oral tablet [take 1/2 tablet by oral route once daily], #45 (forty five) tablets, Refills: 1 (one)       [Refilled]  Zoloft 100 mg oral tablet [Take 1 tablet(s) by mouth daily], #90 (ninety) tablets, Refills: 0 (zero)       [Refilled] LORazepam 0.5 mg oral tablet [TAKE 1/2 TO 1 TABLET BY MOUTH EVERY 12 HOURS AS NEEDED], #30 (thirty) tablets, Refills: 2 (two)       [Refilled] Zoloft 100 mg oral tablet [Take 1 tablet(s) by mouth daily], #90 (ninety) tablets, Refills: 1 (one)       [Refilled] amitriptyline 100 mg oral tablet [Take 1 tablet(s) by mouth at bedtime], #90 (ninety) tablets, Refills: 1 (one)       [Refilled] Simvastatin 20 mg oral tablet [Take 1 tablet(s) by mouth at bedtime], #90 (ninety) tablets, Refills: 1 (one)       [Refilled] losartan-hydrochlorothiazide 100-25 mg oral tablet [take 1/2 tablet by oral route once daily], #45 (forty five) tablets, Refills: 1 (one)         Radiology/Test Orders:       3017F  Colorectal CA screen results documented and reviewed (PV)  (In-House)              Lab Orders:       [id number]  COMP - [facility] Comp. Metabolic Panel  (Send-Out)    CC DR. TIBURCIO Vidal NEPHROLOGY        50709  TSH - [facility] TSH  (Send-Out)    CC DR. TIBURCIO HOROWITZ        50849  FERR - [facility] Ferritin Serum  (Send-Out)    CC DR. TIBURCIO HOROWITZ        18834  IRONP - [facility] Iron and TIBC  (Send-Out)    CC DR. TIBURCIO HOROWITZ        96218  B12FO - [facility] Vitamin B12 with Folate  (Send-Out)    CC DR. TIBURCIO HOROWITZ        42743  BDCB2 - H CBC w/o diff  (Send-Out)    CC DR. TIBURCIO Vidal NEPHROLOGY        65091  Drug test prsmv qual dir optical obs per day  (In-House)              Other Orders:         Depression screen positive and follow up plan documented  (In-House)            1101F  Pt screen for fall risk; document no falls in past year or only 1 fall w/o injury in past year (STUART)  (In-House)              Screening mammogram results documented  (Send-Out)            1124F  Advance Care Planning discussed and doc in MR; no surrogate named or advance care plan provided  (Send-Out)              Subsequent Annual  Well Visit Medicare  (x1)                  Plan:         Encounter for general adult medical examination without abnormal findings        RECOMMENDATIONS given include: See attached wellness recommendations.  We will also refill her medications including anxiety medications..  MIPS PHQ-9 Depression Screening: Completed form scanned and in chart; Total Score 9 Positive Depression Screen: Stable on medications. No suicidal ideation.  ADVANCED DIRECTIVES: None               Prescriptions:       [Refilled] amitriptyline 100 mg oral tablet [Take 1 tablet(s) by mouth at bedtime], #90 (ninety) tablets, Refills: 1 (one)       [Refilled] Simvastatin 20 mg oral tablet [Take 1 tablet(s) by mouth at bedtime], #90 (ninety) tablets, Refills: 1 (one)       [Refilled] losartan-hydrochlorothiazide 100-25 mg oral tablet [take 1/2 tablet by oral route once daily], #45 (forty five) tablets, Refills: 1 (one)       [Refilled] Zoloft 100 mg oral tablet [Take 1 tablet(s) by mouth daily], #90 (ninety) tablets, Refills: 0 (zero)       [Refilled] LORazepam 0.5 mg oral tablet [TAKE 1/2 TO 1 TABLET BY MOUTH EVERY 12 HOURS AS NEEDED], #30 (thirty) tablets, Refills: 2 (two)       [Refilled] Zoloft 100 mg oral tablet [Take 1 tablet(s) by mouth daily], #90 (ninety) tablets, Refills: 1 (one)       [Refilled] amitriptyline 100 mg oral tablet [Take 1 tablet(s) by mouth at bedtime], #90 (ninety) tablets, Refills: 1 (one)       [Refilled] Simvastatin 20 mg oral tablet [Take 1 tablet(s) by mouth at bedtime], #90 (ninety) tablets, Refills: 1 (one)       [Refilled] losartan-hydrochlorothiazide 100-25 mg oral tablet [take 1/2 tablet by oral route once daily], #45 (forty five) tablets, Refills: 1 (one)           Orders:         Depression screen positive and follow up plan documented  (In-House)            1101F  Pt screen for fall risk; document no falls in past year or only 1 fall w/o injury in past year (STUART)  (In-House)              Screening  mammogram results documented  (Send-Out)            3017F  Colorectal CA screen results documented and reviewed (PV)  (In-House)            1124F  Advance Care Planning discussed and doc in MR; no surrogate named or advance care plan provided  (Send-Out)              Mixed hyperlipidemia    LABORATORY:  Labs ordered to be performed today include Comprehensive metabolic panel and TSH.            Orders:       [id number]  COMP - [facility] Comp. Metabolic Panel  (Send-Out)    CC DR. TIBURCIO Vidal NEPHROLOGY        20058  TSH - [facility] TSH  (Send-Out)    CC DR. TIBURCIO HOROWITZ          Essential (primary) hypertensionAs above.        Other mixed anxiety disordersAs above.    LABORATORY:  Labs ordered to be performed today include Drug screen.            Orders:       [id number]  Drug test prsmv qual dir optical obs per day  (In-House)              Anemia, unspecified    LABORATORY:  Labs ordered to be performed today include Anemia profile ferritin Serum iron, B12 with Folate, and CBC W/O DIFF.            Orders:       30167  FERR - [facility] Ferritin Serum  (Send-Out)    CC DR. TIBURCIO Vidal NEPHYULISA        64931  IRONP - [facility] Iron and TIBC  (Send-Out)    CC DR. TIBURCIO HOROWITZ        57120  B12FO - [facility] Vitamin B12 with Folate  (Send-Out)    CC DR. TIBURCIO HOROWITZ        11777  BDCB2 - [facility] CBC w/o diff  (Send-Out)    CC DR. TIBURCIO Vidal NEPHYULISA              Charge Capture:         Primary Diagnosis:     Z00.00  Encounter for general adult medical examination without abnormal findings           Orders:      [id number]  Preventive medicine, established patient, age 65+ years  (In-House)              Subsequent Annual Well Visit Medicare  (x1)          Depression screen positive and follow up plan documented  (In-House)            1101F  Pt screen for fall risk; document no falls in past year or only 1 fall w/o injury in past year (STUART)  (In-House)            3017F  Colorectal CA screen results documented and reviewed (PV)  (In-House)               E78.2  Mixed hyperlipidemia           Orders:      90234-90  Office/outpatient visit; established patient, level 4  (In-House)              I10  Essential (primary) hypertension     F41.3  Other mixed anxiety disorders           Orders:      [id number]  Drug test prsmv qual dir optical obs per day  (In-House)              D64.9  Anemia, unspecified

## 2021-05-18 NOTE — PROGRESS NOTES
Cuauhtemoc Davis 1945     Office/Outpatient Visit    Visit Date: Mon, Jun 24, 2019 11:10 am    Provider: Waylon Tang MD (Assistant: Joya Deleon RN)    Location: [facility]        Electronically signed by Waylon Tang MD on  06/25/2019 06:35:20 AM                             SUBJECTIVE:        CC: physical exam         HPI:         Mrs. Davis is here for a Medicare wellness visit.  The required HRA questions are integrated within this visit note. Family medical history and individual medical/surgical history were reviewed and updated.  A current height, weight, BMI, blood pressure, and pulse were recorded in the vitals section of the note and have been reviewed. Patient's medications, including supplements, were recorded in the chart and reviewed.  Current providers and suppliers were reviewed and updated.          Self-Assessment of Health: She rates her health as good. She rates her confidence of being able to control/manage most of her health problems as very confident. Her physical/emotional health has limited her social activites not at all.  A review of cognitive impairment was performed, including ability to drive a car, manage finances, and any memory changes, and was found to be negative.  A review of functional ability, including bathing, dressing, walking, and urine/bowel continence as well as level of safety was performed and was found to be negative.  Falls Risk: Has not had any falls or only one fall without injury in the past year.  She denies having trouble hearing the TV/radio when others do not, having to strain to hear or understand conversations and wearing hearing aid(s).  Concerning home safety, she reports that at home she DOES have adequate lighting, a skid resistant shower/tub, functioning smoke alarms and absence of throw rugs, but not grab bars in the bath or handrails on stairs.          Immunization Status: ** >10 years since last Td booster; Age>60, no shingles  vaccination; Physical Activity: She exercises but less than 20 minutes 3 days per week; Type of diet patient normally eats is described as well-balanced with fruits and vegetables Tobacco: She has never smoked.          With regard to the hypertension, her current cardiac medication regimen includes a combination medication ( Zestoretic ).  She is tolerating the medication well without side effects.          With regard to the hypercholesterolemia, current treatment includes Zocor.  Compliance with treatment has been good; she takes her medication as directed and follows up as directed.  She denies experiencing any hypercholesterolemia related symptoms.          Cuauhtemoc is also in today for follow up on anxiety.  This has been a chronic issue for her for which she sparingly uses lorazepam.  She would like to continue this presently.  She also remains on generic Zoloft and tolerates it.  Risks are discussed with her again today.  Issac is okay.     ROS:     CONSTITUTIONAL:  Negative for chills and fever.      CARDIOVASCULAR:  Negative for chest pain and palpitations.      RESPIRATORY:  Negative for recent cough and dyspnea.      GASTROINTESTINAL:  Negative for abdominal pain, nausea and vomiting.          PMH/FMH/SH:     Last Reviewed on 6/24/2019 11:47 AM by Waylon Tang    Past Medical History:                 PAST MEDICAL HISTORY         Hyperlipidemia    Hypertension     Anxiety             ADVANCED DIRECTIVES: None - Her , Lex, would make decisions for her if needed.         PREVENTIVE HEALTH MAINTENANCE             BONE DENSITY: was last done 12/12/11 with the following abnormality noted-- Osteopenia     COLORECTAL CANCER SCREENING: Up to date (colonoscopy q10y; sigmoidoscopy q5y; Cologuard q3y) was last done 6/20/12, Results are in chart; colonoscopy with normal results     MAMMOGRAM: Done within last 2 years and results in are chart was last done 10/17/18 with normal results         Surgical  History:         Appendectomy    Hysterectomy: partial;      carpal tunnel;    thoracic outlet - L;         Family History:     Father: Coronary Artery Disease     Mother: Breast Cancer         Social History:         Marital Status:      Children: 2 children         Tobacco/Alcohol/Supplements:     Last Reviewed on 6/24/2019 11:47 AM by Waylon Tang    Tobacco: She has never smoked.  Non-drinker         Substance Abuse History:     Last Reviewed on 6/24/2019 11:47 AM by Waylon Tang        Mental Health History:     Last Reviewed on 6/24/2019 11:47 AM by Waylon Tang        Communicable Diseases (eg STDs):     Last Reviewed on 6/24/2019 11:47 AM by Waylon Tang            Current Problems:     Last Reviewed on 6/24/2019 11:47 AM by Waylon Tang    Common migraine     Use of high risk medications     Anxiety     Hypertension     Hypercholesterolemia     Dysuria         Immunizations:     Fluzone vs. High Dose Fluzone 10/21/2012     zzPrevnar-13 8/12/2015     Fluzone (3 + years dose) 10/31/2007     Fluzone (3 + years dose) 10/21/2008     Fluzone (3 + years dose) 9/23/2009     Fluzone (3 + years dose) 9/17/2010     Fluzone (3 + years dose) 10/27/2011     Fluzone High-Dose pf (>=65 yr) 10/10/2013     Fluzone High-Dose pf (>=65 yr) 10/21/2012     Fluzone High-Dose pf (>=65 yr) 10/10/2014     Fluzone High-Dose pf (>=65 yr) 10/15/2015     Fluzone High-Dose pf (>=65 yr) 9/16/2016     Fluzone High-Dose pf (>=65 yr) 10/2/2017     Fluzone High-Dose pf (>=65 yr) 11/21/2018     Pneumococcal, 23-valent IM/SC (adult and pt >=2yr) 8/17/2012         Allergies:     Last Reviewed on 6/24/2019 11:47 AM by Waylon Tang    Penicillins: hives        Current Medications:     Last Reviewed on 6/24/2019 11:47 AM by Waylon Tang    Amitriptyline HCl 100mg Tablet Take 1 tablet(s) by mouth at bedtime     Fosamax 70mg Tablet Take 1 tablet(s) by mouth q week      Lisinopril/Hydrochlorothiazide 10mg/12.5mg Tablet Take 1 tablet(s) by mouth daily     Lorazepam 0.5mg Tablet take 1/2 to 1 tablet every 12 hours as needed for anxiety     Simvastatin 20mg Tablet Take 1 tablet(s) by mouth at bedtime     Zoloft 100mg Tablet Take 1 tablet(s) by mouth daily     Fluticasone Propionate         OBJECTIVE:        Vitals:         Current: 6/24/2019 11:15:03 AM    Ht:  5 ft, 5 in;  Wt: 152.2 lbs;  BMI: 25.3    T: 98.1 F (oral);  BP: 111/81 mm Hg (right arm, sitting);  P: 87 bpm (right arm (BP Cuff), sitting);  sCr: 1.67 mg/dL;  GFR: 30.95        Exams:     PHYSICAL EXAM:     GENERAL: vital signs recorded - well developed, well nourished;  no apparent distress;     EYES: extraocular movements intact; conjunctiva and cornea are normal; PERRLA; binocular vision is 20/25 - hearing is normal     E/N/T:  normal EACs, TMs, nasal/oral mucosa, teeth, gingiva, and oropharynx;     NECK: range of motion is normal; thyroid is non-palpable;     RESPIRATORY: normal respiratory rate and pattern with no distress; normal breath sounds with no rales, rhonchi, wheezes or rubs;     CARDIOVASCULAR: normal rate; rhythm is regular;  no systolic murmur; no edema;     GASTROINTESTINAL: nontender; normal bowel sounds; no organomegaly;     LYMPHATIC: no enlargement of cervical or facial nodes; no supraclavicular nodes;     BREAST/INTEGUMENT: SKIN: no significant rashes or lesions; no suspicious moles;     NEUROLOGIC: mental status: alert and oriented x 3; cranial nerves II-XII grossly intact;     PSYCHIATRIC: affect/demeanor: anxious;  normal psychomotor function;         Lab/Test Results:             Total Cholesterol:  159 (06/24/2019),     HDL:  64 (06/24/2019),     Triglycerides:  170 (06/24/2019),     LDL:  61 (06/24/2019),     non-HDL:  95 (06/24/2019),     LDL/HDL Ratio:  1.0 (06/24/2019),     Performed by::  pr (06/24/2019),             ASSESSMENT           V70.0   Z00.01  Yearly physical exam              DDx:      401.1   I10  Hypertension              DDx:     272.0   E78.2  Hypercholesterolemia              DDx:     V58.69   Z79.899  Use of high risk medications              DDx:     300.02   F41.3  Anxiety              DDx:         ORDERS:         Meds Prescribed:       Refill of: Amitriptyline HCl 100mg Tablet Take 1 tablet(s) by mouth at bedtime  #90 (Ninety) tablet(s) Refills: 1       Refill of: Lisinopril/Hydrochlorothiazide 10mg/12.5mg Tablet Take 1 tablet(s) by mouth daily  #90 (Ninety) tablet(s) Refills: 1       Refill of: Lorazepam 0.5mg Tablet take 1/2 to 1 tablet every 12 hours as needed for anxiety  #30 (Thirty) tablet(s) Refills: 0       Refill of: Simvastatin 20mg Tablet Take 1 tablet(s) by mouth at bedtime  #90 (Ninety) tablet(s) Refills: 1       Refill of: Zoloft (Sertraline HCl) 100mg Tablet Take 1 tablet(s) by mouth daily  #90 (Ninety) tablet(s) Refills: 1         Radiology/Test Orders:       3014F  Screening mammography results documented and reviewed (PV)1  (In-House)         3017F  Colorectal CA screen results documented and reviewed (PV)  (In-House)           Lab Orders:       50929*  Hemocult cards sent home with patient  (Send-Out)         77905  [facility] Lipid Panel  (In-House)           Procedures Ordered:       69222  Collection of capillary blood specimen (eg, finger, heel, ear stick)  (In-House)           Other Orders:         Depression screen negative  (In-House)         1101F  Pt screen for fall risk; document no falls in past year or only 1 fall w/o injury in past year (STUART)  (In-House)           Negative EtOH screen  (In-House)           Subsequent Annual Well Visit Medicare (x1)                 PLAN:          Yearly physical exam     Today, we have reviewed Cuauhtemoc's care.  See attached wellness recommendations.  I'm going to refill her medications as noted and arrange follow up testing as noted.  No changes are anticipated.     LABORATORY:  Labs ordered to be performed  today include Hemocult take home cards.  [facility] PHQ-9 Depression Screening: Completed form scanned and in chart; Total Score 7; Negative Depression Screen Negative alcohol screen           Orders:         Depression screen negative  (In-House)         1101F  Pt screen for fall risk; document no falls in past year or only 1 fall w/o injury in past year (STUART)  (In-House)           Negative EtOH screen  (In-House)         3014F  Screening mammography results documented and reviewed (PV)1  (In-House)         3017F  Colorectal CA screen results documented and reviewed (PV)  (In-House)         15244*  Hemocult cards sent home with patient  (Send-Out)             Patient Education Handouts:       Physical Exam 60+ year, Female           Hypertension           Prescriptions:       Refill of: Lisinopril/Hydrochlorothiazide 10mg/12.5mg Tablet Take 1 tablet(s) by mouth daily  #90 (Ninety) tablet(s) Refills: 1          Hypercholesterolemia     LABORATORY:  Labs ordered to be performed today include lipid panel.            Prescriptions:       Refill of: Simvastatin 20mg Tablet Take 1 tablet(s) by mouth at bedtime  #90 (Ninety) tablet(s) Refills: 1           Orders:       [id number]  [facility] Lipid Panel  (In-House)         33950  Collection of capillary blood specimen (eg, finger, heel, ear stick)  (In-House)            Use of high risk medications As above.          Anxiety           Prescriptions:       Refill of: Lorazepam 0.5mg Tablet take 1/2 to 1 tablet every 12 hours as needed for anxiety  #30 (Thirty) tablet(s) Refills: 0       Refill of: Zoloft (Sertraline HCl) 100mg Tablet Take 1 tablet(s) by mouth daily  #90 (Ninety) tablet(s) Refills: 1             Other Prescriptions:       Refill of: Amitriptyline HCl 100mg Tablet Take 1 tablet(s) by mouth at bedtime  #90 (Ninety) tablet(s) Refills: 1         CHARGE CAPTURE           **Please note: ICD descriptions below are intended for billing purposes only and may not  represent clinical diagnoses**        Primary Diagnosis:         V70.0 Yearly physical exam            Z00.01    Encounter for general adult medical examination with abnormal findings              Orders:          53340   Preventive medicine, established patient, age 65+ years  (In-House)                                           Subsequent Annual Well Visit Medicare (x1)              Depression screen negative  (In-House)             1101F   Pt screen for fall risk; document no falls in past year or only 1 fall w/o injury in past year (STUART)  (In-House)                Negative EtOH screen  (In-House)             3014F   Screening mammography results documented and reviewed (PV)1  (In-House)             3017F   Colorectal CA screen results documented and reviewed (PV)  (In-House)           401.1 Hypertension            I10    Essential (primary) hypertension              Orders:          12581 -25  Office/outpatient visit; established patient, level 4  (In-House)           272.0 Hypercholesterolemia            E78.2    Mixed hyperlipidemia              Orders:          15402   [facility] Lipid Panel  (In-House)             51819   Collection of capillary blood specimen (eg, finger, heel, ear stick)  (In-House)           V58.69 Use of high risk medications            Z79.899    Other long term (current) drug therapy    300.02 Anxiety            F41.3    Other mixed anxiety disorders

## 2021-05-18 NOTE — PROGRESS NOTES
Cuauhtemoc Davis S. 1945     Office/Outpatient Visit    Visit Date: Mon, Sep 16, 2019 09:59 am    Provider: Waylon Tang MD (Assistant: Joya Deleon RN)    Location: [facility]        Electronically signed by Waylon Tang MD on  09/16/2019 05:11:06 PM                             SUBJECTIVE:        CC: anxiety follow up         HPI:     Cuauhtemoc is in today for follow up on anxiety.  This has been a chronic issue for her for which she uses lorazepam.  She would like to continue this presently.  She also remains on generic Zoloft and tolerates it.  Risks are discussed with her again today.  Issac is okay.  She has had some increased stress in the last few months due to some family issues.     ROS:     CONSTITUTIONAL:  Negative for chills and fever.      CARDIOVASCULAR:  Negative for chest pain and palpitations.      RESPIRATORY:  Positive for recent cough.   Negative for dyspnea.      GASTROINTESTINAL:  Negative for abdominal pain, nausea and vomiting.      INTEGUMENTARY/BREAST:  Negative for atypical mole(s) and rash.          [facility]/:     Last Reviewed on 6/24/2019 11:47 AM by Waylon Tang    Past Medical History:                 PAST MEDICAL HISTORY         Hyperlipidemia    Hypertension     Anxiety             ADVANCED DIRECTIVES: None - Her , Lex, would make decisions for her if needed.         PREVENTIVE HEALTH MAINTENANCE             BONE DENSITY: was last done 12/12/11 with the following abnormality noted-- Osteopenia     COLORECTAL CANCER SCREENING: Up to date (colonoscopy q10y; sigmoidoscopy q5y; Cologuard q3y) was last done 6/20/12, Results are in chart; colonoscopy with normal results     MAMMOGRAM: Done within last 2 years and results in are chart was last done 10/17/18 with normal results         Surgical History:         Appendectomy    Hysterectomy: partial;      carpal tunnel;    thoracic outlet - L;         Family History:     Father: Coronary Artery Disease      Mother: Breast Cancer         Social History:         Marital Status:      Children: 2 children         Tobacco/Alcohol/Supplements:     Last Reviewed on 8/12/2019 10:45 AM by Adrianna Vang    Tobacco: She has never smoked.  Non-drinker         Substance Abuse History:     Last Reviewed on 6/24/2019 11:47 AM by Waylon Tang        Mental Health History:     Last Reviewed on 6/24/2019 11:47 AM by Waylon Tang        Communicable Diseases (eg STDs):     Last Reviewed on 6/24/2019 11:47 AM by Waylon Tang            Current Problems:     Last Reviewed on 6/24/2019 11:47 AM by Waylon Tang    Common migraine     Use of high risk medications     Anxiety     Hypertension     Hypercholesterolemia     Dysuria         Immunizations:     Fluzone vs. High Dose Fluzone 10/21/2012     zzPrevnar-13 8/12/2015     Fluzone (3 + years dose) 10/31/2007     Fluzone (3 + years dose) 10/21/2008     Fluzone (3 + years dose) 9/23/2009     Fluzone (3 + years dose) 9/17/2010     Fluzone (3 + years dose) 10/27/2011     Fluzone High-Dose pf (>=65 yr) 10/10/2013     Fluzone High-Dose pf (>=65 yr) 10/21/2012     Fluzone High-Dose pf (>=65 yr) 10/10/2014     Fluzone High-Dose pf (>=65 yr) 10/15/2015     Fluzone High-Dose pf (>=65 yr) 9/16/2016     Fluzone High-Dose pf (>=65 yr) 10/2/2017     Fluzone High-Dose pf (>=65 yr) 11/21/2018     Pneumococcal, 23-valent IM/SC (adult and pt >=2yr) 8/17/2012         Allergies:     Last Reviewed on 8/12/2019 10:45 AM by Adrianna Vang    Penicillins: hives        Current Medications:     Last Reviewed on 8/12/2019 10:45 AM by Adrianna Vang    Lisinopril/Hydrochlorothiazide 10mg/12.5mg Tablet Take 1 tablet(s) by mouth daily     Lorazepam 0.5mg Tablet take 1/2 to 1 tablet every 12 hours as needed for anxiety     Simvastatin 20mg Tablet Take 1 tablet(s) by mouth at bedtime     Zoloft 100mg Tablet Take 1 tablet(s) by mouth daily     Fosamax  70mg Tablet Take 1 tablet(s) by mouth q week     Amitriptyline HCl 100mg Tablet Take 1 tablet(s) by mouth at bedtime     Fluticasone Propionate         OBJECTIVE:        Vitals:         Current: 9/16/2019 10:03:56 AM    Ht:  5 ft, 5 in;  Wt: 148.8 lbs;  BMI: 24.8    T: 98.7 F (oral);  BP: 129/73 mm Hg (right arm, sitting);  P: 78 bpm (right arm (BP Cuff), sitting);  sCr: 1.67 mg/dL;  GFR: 30.65        Exams:     PHYSICAL EXAM:     GENERAL: vital signs recorded - well developed, well nourished;  no apparent distress;     NECK: range of motion is normal; thyroid is non-palpable;     RESPIRATORY: normal respiratory rate and pattern with no distress; normal breath sounds with no rales, rhonchi, wheezes or rubs;     CARDIOVASCULAR: normal rate; rhythm is regular;  no systolic murmur; no edema;     GASTROINTESTINAL: nontender; normal bowel sounds; no organomegaly;     LYMPHATIC: no enlargement of cervical or facial nodes; no supraclavicular nodes;     BREAST/INTEGUMENT: SKIN: no significant rashes or lesions; no suspicious moles;     PSYCHIATRIC: affect/demeanor: anxious;  normal psychomotor function;         Lab/Test Results:             Urine temperature:  confirmed (09/16/2019),     All urine drug screen levels confirmed negative:  yes (09/16/2019),     Date and time of last pill:  lorazepam-9/15/19 @ 11am/amyh (09/16/2019),     Performed by:  pauline (09/16/2019),     Collection Time:  1032 (09/16/2019),             ASSESSMENT           V58.69   Z79.899  Use of high risk medications              DDx:     300.02   F41.3  Anxiety              DDx:     786.2   R05  Cough              DDx:         ORDERS:         Meds Prescribed:       Refill of: Lorazepam 0.5mg Tablet take 1/2 to 1 tablet every 12 hours as needed for anxiety  #30 (Thirty) tablet(s) Refills: 1       Singulair  (Montelukast Sodium) 10mg Tablet Take 1 tablet(s) by mouth each evening  #30 (Thirty) tablet(s) Refills: 0         Radiology/Test Orders:       3014F   Screening mammography results documented and reviewed (PV)1  (In-House)         3017F  Colorectal CA screen results documented and reviewed (PV)  (In-House)           Lab Orders:       [id number]  Drug test prsmv qual dir optical obs per day  (In-House)           Other Orders:         Depression screen positive and follow up plan documented  (In-House)         1100F  Pt screen for fall risk; document 2+ falls in the past yr or any fall w/injury in past year (STUART)  (In-House)                   PLAN:          Use of high risk medications     Cuauhtemoc continues to struggle with anxiety.  I do not think there is an opportunity for dose reduction presently.  We will refill lorazepam at this time.  No other near term changes.  She has some mild cough which may be allergy related.  We will try some Singulair for the near term.     LABORATORY:  Labs ordered to be performed today include Drug screen.  [facility] PHQ-9 Depression Screening: Completed form scanned and in chart; Total Score 17 Positive Depression Screen: Stable on medications. No suicidal ideation.            Orders:       [id number]  Drug test prsmv qual dir optical obs per day  (In-House)           Depression screen positive and follow up plan documented  (In-House)         3014F  Screening mammography results documented and reviewed (PV)1  (In-House)         3017F  Colorectal CA screen results documented and reviewed (PV)  (In-House)         1100F  Pt screen for fall risk; document 2+ falls in the past yr or any fall w/injury in past year (STUART)  (In-House)            Anxiety As above.           Prescriptions:       Refill of: Lorazepam 0.5mg Tablet take 1/2 to 1 tablet every 12 hours as needed for anxiety  #30 (Thirty) tablet(s) Refills: 1           Patient Education Handouts:       Generalized Anxiety Disorder           Cough           Prescriptions:       Singulair  (Montelukast Sodium) 10mg Tablet Take 1 tablet(s) by mouth each evening  #30 (Thirty) tablet(s)  Refills: 0             CHARGE CAPTURE           **Please note: ICD descriptions below are intended for billing purposes only and may not represent clinical diagnoses**        Primary Diagnosis:         V58.69 Use of high risk medications            Z79.899    Other long term (current) drug therapy              Orders:          07023   Office/outpatient visit; established patient, level 3  (In-House)             97661   Drug test prsmv qual dir optical obs per day  (In-House)                Depression screen positive and follow up plan documented  (In-House)             3014F   Screening mammography results documented and reviewed (PV)1  (In-House)             3017F   Colorectal CA screen results documented and reviewed (PV)  (In-House)             1100F   Pt screen for fall risk; document 2+ falls in the past yr or any fall w/injury in past year (STUART)  (In-House)           300.02 Anxiety            F41.3    Other mixed anxiety disorders    786.2 Cough            R05    Cough

## 2021-05-18 NOTE — PROGRESS NOTES
Cuauhtemoc Davis 1945     Office/Outpatient Visit    Visit Date: Wed, Jul 3, 2019 05:33 pm    Provider: Lauren Zuleta N.P. (Assistant: Elsa Bonner MA)    Location: [facility]        Electronically signed by Lauren Zuleta N.P. on  07/03/2019 05:55:54 PM                             SUBJECTIVE:        CC:     Mrs. Davis is a 74 year old White female.  Patient presents today for suture removal;         HPI: Cuauhtemoc is here today to have stitches removed. She had 13 stitches placed in forehead after fall on 6/29/18. No problems reported.     ROS:     CONSTITUTIONAL:  Negative for chills and fever.      CARDIOVASCULAR:  Negative for chest pain and palpitations.      RESPIRATORY:  Negative for dyspnea and frequent wheezing.      INTEGUMENTARY/BREAST:  Positive for stitches removed after fall.          [facility]/:     Last Reviewed on 6/24/2019 11:47 AM by Waylon Tang    Past Medical History:                 PAST MEDICAL HISTORY         Hyperlipidemia    Hypertension     Anxiety             ADVANCED DIRECTIVES: None - Her , Lex, would make decisions for her if needed.         PREVENTIVE HEALTH MAINTENANCE             BONE DENSITY: was last done 12/12/11 with the following abnormality noted-- Osteopenia     COLORECTAL CANCER SCREENING: Up to date (colonoscopy q10y; sigmoidoscopy q5y; Cologuard q3y) was last done 6/20/12, Results are in chart; colonoscopy with normal results     MAMMOGRAM: Done within last 2 years and results in are chart was last done 10/17/18 with normal results         Surgical History:         Appendectomy    Hysterectomy: partial;      carpal tunnel;    thoracic outlet - L;         Family History:     Father: Coronary Artery Disease     Mother: Breast Cancer         Social History:         Marital Status:      Children: 2 children         Tobacco/Alcohol/Supplements:     Last Reviewed on 6/24/2019 11:47 AM by Waylon Tang    Tobacco: She has never  smoked.  Non-drinker         Substance Abuse History:     Last Reviewed on 6/24/2019 11:47 AM by Waylon Tang        Mental Health History:     Last Reviewed on 6/24/2019 11:47 AM by Waylon Tang        Communicable Diseases (eg STDs):     Last Reviewed on 6/24/2019 11:47 AM by Waylon Tang            Current Problems:     Last Reviewed on 6/24/2019 11:47 AM by Waylon Tang    Common migraine     Use of high risk medications     Anxiety     Hypertension     Hypercholesterolemia     Screening for colon cancer     Dysuria         Immunizations:     Fluzone vs. High Dose Fluzone 10/21/2012     zzPrevnar-13 8/12/2015     Fluzone (3 + years dose) 10/31/2007     Fluzone (3 + years dose) 10/21/2008     Fluzone (3 + years dose) 9/23/2009     Fluzone (3 + years dose) 9/17/2010     Fluzone (3 + years dose) 10/27/2011     Fluzone High-Dose pf (>=65 yr) 10/10/2013     Fluzone High-Dose pf (>=65 yr) 10/21/2012     Fluzone High-Dose pf (>=65 yr) 10/10/2014     Fluzone High-Dose pf (>=65 yr) 10/15/2015     Fluzone High-Dose pf (>=65 yr) 9/16/2016     Fluzone High-Dose pf (>=65 yr) 10/2/2017     Fluzone High-Dose pf (>=65 yr) 11/21/2018     Pneumococcal, 23-valent IM/SC (adult and pt >=2yr) 8/17/2012         Allergies:     Last Reviewed on 6/24/2019 11:47 AM by Waylon Tang    Penicillins: hives        Current Medications:     Last Reviewed on 6/24/2019 11:47 AM by Waylon Tang    Amitriptyline HCl 100mg Tablet Take 1 tablet(s) by mouth at bedtime     Lisinopril/Hydrochlorothiazide 10mg/12.5mg Tablet Take 1 tablet(s) by mouth daily     Lorazepam 0.5mg Tablet take 1/2 to 1 tablet every 12 hours as needed for anxiety     Simvastatin 20mg Tablet Take 1 tablet(s) by mouth at bedtime     Zoloft 100mg Tablet Take 1 tablet(s) by mouth daily     Fosamax 70mg Tablet Take 1 tablet(s) by mouth q week     Fluticasone Propionate         OBJECTIVE:        Vitals:         Current:  7/3/2019 5:37:25 PM    Ht:  5 ft, 5 in;  Wt: 152.6 lbs;  BMI: 25.4    T: 98.3 F (oral);  BP: 121/83 mm Hg (right arm, sitting);  P: 87 bpm (right arm (BP Cuff), sitting);  sCr: 1.67 mg/dL;  GFR: 30.98        Exams:     PHYSICAL EXAM:     GENERAL: well developed, well nourished;  no apparent distress;     RESPIRATORY: normal respiratory rate and pattern with no distress; normal breath sounds with no rales, rhonchi, wheezes or rubs;     CARDIOVASCULAR: normal rate; rhythm is regular;     BREAST/INTEGUMENT: SKIN: no significant rashes or lesions; no suspicious moles; 13 stitches intact to forehead;     NEUROLOGIC: mental status: alert and oriented x 3; GROSSLY INTACT     PSYCHIATRIC: appropriate affect and demeanor;         Procedures:     Encounter for removal of sutures     Sutures were removed today without difficulty.  The area is healed without signs of infection.              ASSESSMENT           V58.32   Z48.02  Encounter for removal of sutures              DDx:         PLAN:          Encounter for removal of sutures         RECOMMENDATIONS given include: Monitor for s/s of infection. Keep area clean. Follow up for worsening syptoms as needed..      FOLLOW-UP: Chronic visit follow up             CHARGE CAPTURE           **Please note: ICD descriptions below are intended for billing purposes only and may not represent clinical diagnoses**        Primary Diagnosis:         V58.32 Encounter for removal of sutures            Z48.02    Encounter for removal of sutures              Orders:          37856   Office/outpatient visit; established patient, level 3  (In-House)

## 2021-05-18 NOTE — PROGRESS NOTES
"Cuauhtemoc Davis. 1945     Office/Outpatient Visit    Visit Date: Mon, Aug 12, 2019 10:43 am    Provider: Lauren Zuleta N.P. (Assistant: Adrianna Vang MA)    Location: [facility]        Electronically signed by Lauren Zuleta N.P. on  08/12/2019 01:08:46 PM                             SUBJECTIVE:        CC:     Mrs. Davis is a 74 year old White female.  presents today due to anxiety         HPI: Cuauhtemoc presents with c/o increased anxiety due to dealing with family issues. She has noted some \"upset stomach\" that she gets with increased anxiety. She reports that she has not been taking all of her medications as she was not given them by the pharmacy. She is supposed to be on Amitriptyline, Zoloft, and Lorazepam as needed. She notes that she did take Lorazepam yesterday. She does not think that she has taken her Amitriptyline for several weeks.                 ROS:     CONSTITUTIONAL:  Negative for chills and fever.      CARDIOVASCULAR:  Negative for chest pain and palpitations.      RESPIRATORY:  Negative for dyspnea and frequent wheezing.      GASTROINTESTINAL:  Positive for nausea and \"upset stomach\".   Negative for abdominal pain or vomiting.      NEUROLOGICAL:  Negative for dizziness and headaches.      PSYCHIATRIC:  Positive for anxiety.   Negative for suicidal thoughts.          PM/FM/:     Last Reviewed on 6/24/2019 11:47 AM by Waylon Tang    Past Medical History:                 PAST MEDICAL HISTORY         Hyperlipidemia    Hypertension     Anxiety             ADVANCED DIRECTIVES: None - Her , Lex, would make decisions for her if needed.         PREVENTIVE HEALTH MAINTENANCE             BONE DENSITY: was last done 12/12/11 with the following abnormality noted-- Osteopenia     COLORECTAL CANCER SCREENING: Up to date (colonoscopy q10y; sigmoidoscopy q5y; Cologuard q3y) was last done 6/20/12, Results are in chart; colonoscopy with normal results     MAMMOGRAM: Done within " last 2 years and results in are chart was last done 10/17/18 with normal results         Surgical History:         Appendectomy    Hysterectomy: partial;      carpal tunnel;    thoracic outlet - L;         Family History:     Father: Coronary Artery Disease     Mother: Breast Cancer         Social History:         Marital Status:      Children: 2 children         Tobacco/Alcohol/Supplements:     Last Reviewed on 7/03/2019 05:37 PM by Elsa Bonner    Tobacco: She has never smoked.  Non-drinker         Substance Abuse History:     Last Reviewed on 6/24/2019 11:47 AM by Waylon Tang        Mental Health History:     Last Reviewed on 6/24/2019 11:47 AM by Waylon Tang        Communicable Diseases (eg STDs):     Last Reviewed on 6/24/2019 11:47 AM by Waylon Tang            Current Problems:     Last Reviewed on 6/24/2019 11:47 AM by Waylon Tang    Common migraine     Use of high risk medications     Anxiety     Hypertension     Hypercholesterolemia     Encounter for removal of sutures     Screening for colon cancer     Dysuria         Immunizations:     Fluzone vs. High Dose Fluzone 10/21/2012     zzPrevnar-13 8/12/2015     Fluzone (3 + years dose) 10/31/2007     Fluzone (3 + years dose) 10/21/2008     Fluzone (3 + years dose) 9/23/2009     Fluzone (3 + years dose) 9/17/2010     Fluzone (3 + years dose) 10/27/2011     Fluzone High-Dose pf (>=65 yr) 10/10/2013     Fluzone High-Dose pf (>=65 yr) 10/21/2012     Fluzone High-Dose pf (>=65 yr) 10/10/2014     Fluzone High-Dose pf (>=65 yr) 10/15/2015     Fluzone High-Dose pf (>=65 yr) 9/16/2016     Fluzone High-Dose pf (>=65 yr) 10/2/2017     Fluzone High-Dose pf (>=65 yr) 11/21/2018     Pneumococcal, 23-valent IM/SC (adult and pt >=2yr) 8/17/2012         Allergies:     Last Reviewed on 7/03/2019 05:36 PM by Elsa Bonner    Penicillins: hives        Current Medications:     Last Reviewed on 7/03/2019 05:36 PM by Ha  Elsa SU    Amitriptyline HCl 100mg Tablet Take 1 tablet(s) by mouth at bedtime     Lisinopril/Hydrochlorothiazide 10mg/12.5mg Tablet Take 1 tablet(s) by mouth daily     Lorazepam 0.5mg Tablet take 1/2 to 1 tablet every 12 hours as needed for anxiety     Simvastatin 20mg Tablet Take 1 tablet(s) by mouth at bedtime     Zoloft 100mg Tablet Take 1 tablet(s) by mouth daily     Fosamax 70mg Tablet Take 1 tablet(s) by mouth q week     Fluticasone Propionate         OBJECTIVE:        Vitals:         Current: 8/12/2019 10:46:58 AM    Ht:  5 ft, 5 in;  Wt: 148.2 lbs;  BMI: 24.7    T: 98.8 F (oral);  BP: 126/77 mm Hg (right arm, sitting);  P: 84 bpm (right arm (BP Cuff), sitting);  sCr: 1.67 mg/dL;  GFR: 30.60        Exams:     PHYSICAL EXAM:     GENERAL: well developed, well nourished;  no apparent distress;     RESPIRATORY: normal respiratory rate and pattern with no distress; normal breath sounds with no rales, rhonchi, wheezes or rubs;     CARDIOVASCULAR: normal rate; rhythm is regular;     GASTROINTESTINAL: nontender; normal bowel sounds; no organomegaly;     NEUROLOGIC: mental status: alert and oriented x 3; GROSSLY INTACT     PSYCHIATRIC: affect/demeanor: anxious;  normal psychomotor function; normal speech pattern; normal thought and perception;         ASSESSMENT           300.02   F41.3  Anxiety              DDx:         ORDERS:         Lab Orders:       APPTO  Appointment need  (In-House)                   PLAN:          Anxiety Discussed importance of taking medications daily. Recommend that she restart her anxiety/depression medications. Will make sure that pharmacy has received all prescription refills as sent in by her PCP.                 FOLLOW-UP: Schedule a follow-up visit in 1 month. Chronic visit follow up           Orders:       APPTO  Appointment need  (In-House)               Patient Recommendations:        For  Anxiety:     Schedule a follow-up visit in 1 month.              CHARGE CAPTURE            **Please note: ICD descriptions below are intended for billing purposes only and may not represent clinical diagnoses**        Primary Diagnosis:         300.02 Anxiety            F41.3    Other mixed anxiety disorders              Orders:          07433   Office/outpatient visit; established patient, level 3  (In-House)             APPTO   Appointment need  (In-House)

## 2021-05-18 NOTE — PROGRESS NOTES
Cuauhtemoc Davis S  1945     Office/Outpatient Visit    Visit Date: Fri, Dec 13, 2019 08:30 am    Provider: Waylon Tang MD (Assistant: Carolyn Vidal, )    Location: [facility]        Electronically signed by Waylon Tang MD on  12/14/2019 07:03:20 AM                             Subjective:        CC: follow up on anxiety, blood pressure, cholesterol    HPI:       Cuauhtemoc is in today for follow up on anxiety.  This has been a chronic issue for her for which she uses lorazepam.  She always has lots of stressors related to her family and does not feel that we could make any change rightnow.  She also remains on generic Zoloft and tolerates it.  Risks are discussed with her again today.  Issac is okay.            In regard to the mixed hyperlipidemia, current treatment includes Zocor.  Compliance with treatment has been good; she takes her medication as directed and follows up as directed.  She denies experiencing any hypercholesterolemia related symptoms.            Dx with essential (primary) hypertension; her current cardiac medication regimen includes a combination medication ( Zestoretic ).  She is tolerating the medication well without side effects.      ROS:     CONSTITUTIONAL:  Negative for chills and fever.      CARDIOVASCULAR:  Negative for chest pain and palpitations.      RESPIRATORY:  Negative for recent cough and dyspnea.      GASTROINTESTINAL:  Negative for abdominal pain, nausea and vomiting.      INTEGUMENTARY/BREAST:  Negative for atypical mole(s) and rash.          Past Medical History / Family History / Social History:         Last Reviewed on 12/13/2019 08:47 AM by Waylon Tang    Past Medical History:                 PAST MEDICAL HISTORY         Hyperlipidemia    Hypertension     Anxiety             ADVANCED DIRECTIVES: None - Her , Lex, would make decisions for her if needed.         PREVENTIVE HEALTH MAINTENANCE             BONE DENSITY: was  last done 11/18/19 with the following abnormality noted-- Osteopenia     COLORECTAL CANCER SCREENING: Up to date (colonoscopy q10y; sigmoidoscopy q5y; Cologuard q3y) was last done 6/20/12, Results are in chart; colonoscopy with normal results     MAMMOGRAM: Done within last 2 years and results in are chart was last done 11/18/19 with normal results         Surgical History:         Appendectomy    Hysterectomy: partial;     carpal tunnel;    thoracic outlet - L;         Family History:     Father: Coronary Artery Disease     Mother: Breast Cancer         Social History:         Marital Status:      Children: 2 children         Tobacco/Alcohol/Supplements:     Last Reviewed on 12/13/2019 08:47 AM by Waylon Tang    Tobacco: She has never smoked.  Non-drinker         Substance Abuse History:     Last Reviewed on 12/13/2019 08:47 AM by Waylon Tang        Mental Health History:     Last Reviewed on 12/13/2019 08:47 AM by Waylon Tang        Communicable Diseases (eg STDs):     Last Reviewed on 12/13/2019 08:47 AM by Waylon Tang        Current Problems:     Last Reviewed on 12/13/2019 08:47 AM by Waylon Tang    Mixed hyperlipidemia    Essential (primary) hypertension    Other mixed anxiety disorders    Other long term (current) drug therapy        Immunizations:     Fluzone vs. High Dose Fluzone 10/21/2012    zzPrevnar-13 8/12/2015    Fluzone (3 + years dose) 10/31/2007    Fluzone (3 + years dose) 10/21/2008    Fluzone (3 + years dose) 9/23/2009    Fluzone (3 + years dose) 9/17/2010    Fluzone (3 + years dose) 10/27/2011    Fluzone High-Dose pf (>=65 yr) 10/10/2013    Fluzone High-Dose pf (>=65 yr) 10/21/2012    Fluzone High-Dose pf (>=65 yr) 10/10/2014    Fluzone High-Dose pf (>=65 yr) 10/15/2015    Fluzone High-Dose pf (>=65 yr) 9/16/2016    Fluzone High-Dose pf (>=65 yr) 10/2/2017    Fluzone High-Dose pf (>=65 yr) 11/21/2018    Fluzone High-Dose pf (>=65 yr)  10/22/2019    Pneumococcal, 23-valent IM/SC (adult and pt >=2yr) 8/17/2012        Allergies:     Last Reviewed on 12/13/2019 08:47 AM by Waylon Tang    lisinopril-hydrochlorothiazide:   (Adverse Reaction)    Penicillins: hives         Current Medications:     Last Reviewed on 12/13/2019 08:47 AM by Waylon Tang    amitriptyline 100 mg oral tablet [Take 1 tablet(s) by mouth at bedtime]    Simvastatin 20 mg oral tablet [Take 1 tablet(s) by mouth at bedtime]    Zoloft 100mg Tablet [Take 1 tablet(s) by mouth daily]    Lorazepam 0.5 mg oral tablet [take 1/2 to 1 tablet every 12 hours as needed for anxiety]    Fosamax 70mg Tablet [Take 1 tablet(s) by mouth q week]    Fluticasone Propionate     Singulair  10mg Tablet [Take 1 tablet(s) by mouth each evening]    Anoro Ellipta 62.5mcg/25mcg per 1blister Inhalation Powder [Take 1 inhalation(s) by mouth daily]    losartan-hydrochlorothiazide 50-12.5 mg oral tablet [take 1 tablet by oral route once daily]        Objective:        Vitals:         Current: 12/13/2019 8:35:58 AM    Ht:  5 ft, 5 in;  Wt: 145.8 lbs;  BMI: 24.3T: 97.8 F (oral);  BP: 113/69 mm Hg (right arm, sitting);  P: 84 bpm (right arm (BP Cuff), sitting);  sCr: 1.39 mg/dL;  GFR: 36.51        Exams:     PHYSICAL EXAM:     GENERAL: vital signs recorded - well developed, well nourished;  no apparent distress;     EYES: extraocular movements intact; conjunctiva and cornea are normal; PERRL;     E/N/T:  normal EACs, TMs, nasal/oral mucosa, teeth, gingiva, and oropharynx;     NECK: range of motion is normal; thyroid is non-palpable;     RESPIRATORY: normal respiratory rate and pattern with no distress; normal breath sounds with no rales, rhonchi, wheezes or rubs;     CARDIOVASCULAR: normal rate; rhythm is regular;  no systolic murmur; no edema;     GASTROINTESTINAL: nontender; normal bowel sounds; no organomegaly;     LYMPHATIC: no enlargement of cervical or facial nodes; no supraclavicular nodes;      BREAST/INTEGUMENT: SKIN: no significant rashes or lesions; no suspicious moles;     NEUROLOGIC: mental status: alert and oriented x 3; cranial nerves II-XII grossly intact;     PSYCHIATRIC: affect/demeanor: anxious;  normal psychomotor function;         Assessment:         F41.3   Other mixed anxiety disorders       Z79.899   Other long term (current) drug therapy       E78.2   Mixed hyperlipidemia       I10   Essential (primary) hypertension           ORDERS:         Meds Prescribed:       [Refilled] Fosamax 70 mg oral tablet [Take 1 tablet(s) by mouth q week], #13 (thirteen) tablets, Refills: 3 (three)       [Refilled] amitriptyline 100 mg oral tablet [Take 1 tablet(s) by mouth at bedtime], #90 (ninety) tablets, Refills: 1 (one)       [Refilled] Simvastatin 20 mg oral tablet [Take 1 tablet(s) by mouth at bedtime], #90 (ninety) tablets, Refills: 1 (one)       [Refilled] Zoloft 100 mg oral tablet [Take 1 tablet(s) by mouth daily], #90 (ninety) tablets, Refills: 1 (one)       [Refilled] Lorazepam 0.5 mg oral tablet [take 1/2 to 1 tablet every 12 hours as needed for anxiety], #30 (thirty) tablets, Refills: 2 (two)       [Refilled] losartan-hydrochlorothiazide 50-12.5 mg oral tablet [take 1 tablet by oral route once daily], #90 (ninety) tablets, Refills: 1 (one)         Radiology/Test Orders:       3014F  Screening mammography results documented and reviewed (PV)1  (In-House)            3017F  Colorectal CA screen results documented and reviewed (PV)  (In-House)              Lab Orders:       [id number]  HTNLP - [facility] CMP AND LIPID: [id number]  (Send-Out)            59653  TSH - [facility] TSH  (Send-Out)              Other Orders:       1101F  Pt screen for fall risk; document no falls in past year or only 1 fall w/o injury in past year (STUART)  (In-House)                      Plan:         Other mixed anxiety disorders        RECOMMENDATIONS given include: Cuauhtemoc is doing okay today.  She remains under significant stress.  We  will continue her current medications and follow from there.  No other changes are anticipated.  We will follow closely.  Labs today..  TERRY Has had no falls or only one fall without injury in the past year Vaccines Flu and Pneumonia updated in Shot record Screening mammomgram done within last 2 years and results in are chart Colorectal Cancer Screening is up to date and the results are in the chart           Prescriptions:       [Refilled] amitriptyline 100 mg oral tablet [Take 1 tablet(s) by mouth at bedtime], #90 (ninety) tablets, Refills: 1 (one)       [Refilled] Zoloft 100 mg oral tablet [Take 1 tablet(s) by mouth daily], #90 (ninety) tablets, Refills: 1 (one)       [Refilled] Lorazepam 0.5 mg oral tablet [take 1/2 to 1 tablet every 12 hours as needed for anxiety], #30 (thirty) tablets, Refills: 2 (two)           Orders:       1101F  Pt screen for fall risk; document no falls in past year or only 1 fall w/o injury in past year (STUART)  (In-House)            3014F  Screening mammography results documented and reviewed (PV)1  (In-House)            3017F  Colorectal CA screen results documented and reviewed (PV)  (In-House)                Patient Education Handouts:       Generalized Anxiety Disorder          Mixed hyperlipidemia    LABORATORY:  Labs ordered to be performed today include HTN/Lipid Panel: CMP, Lipid and TSH.            Prescriptions:       [Refilled] Simvastatin 20 mg oral tablet [Take 1 tablet(s) by mouth at bedtime], #90 (ninety) tablets, Refills: 1 (one)           Orders:       [id number]  HTNLP - [facility] CMP AND LIPID: [id number], [id number]  (Send-Out)            70968  TSH - [facility] TSH  (Send-Out)              Essential (primary) hypertension          Prescriptions:       [Refilled] losartan-hydrochlorothiazide 50-12.5 mg oral tablet [take 1 tablet by oral route once daily], #90 (ninety) tablets, Refills: 1 (one)             Other Prescriptions:       [Refilled] Fosamax 70 mg oral tablet [Take 1 tablet(s) by mouth q  week], #13 (thirteen) tablets, Refills: 3 (three)         Charge Capture:         Primary Diagnosis:     F41.3  Other mixed anxiety disorders           Orders:      [id number]  Office/outpatient visit; established patient, level 4  (In-House)            1101F  Pt screen for fall risk; document no falls in past year or only 1 fall w/o injury in past year (STUART)  (In-House)            3014F  Screening mammography results documented and reviewed (PV)1  (In-House)            3017F  Colorectal CA screen results documented and reviewed (PV)  (In-House)              Z79.899  Other long term (current) drug therapy     E78.2  Mixed hyperlipidemia     I10  Essential (primary) hypertension

## 2021-05-18 NOTE — PROGRESS NOTES
SusanCuauhtemoc turner S. 1945     Office/Outpatient Visit    Visit Date: Tue, Oct 22, 2019 09:22 am    Provider: Waylon Tang MD (Assistant: Joya Deleon RN)    Location: [facility]        Electronically signed by Waylon Tang MD on  10/23/2019 10:54:15 AM                             SUBJECTIVE:        CC: 'get rid of this cough'         HPI:     Cuauhtemoc is in today for follow up on cough.  She has been coughing for 6 months or longer.  She says that the cough is worse at night.  She says that the cough does not want to go away.  She does have some occasional phelgm.  She denies shortness of breath with this.  She denies significant allergy history with this.  She has not had recent evaluation of this.  She does take lisinopril for her blood pressure and has been on this for 'a long time'.  She does not have significant sinus issues.  She denies fever with this.  She does not smoke, but her  does.  She has been around second hand smoke for most of her life as her parents were also smokers.  She denies heartburn. She was prescribed Singulair fairly recently, but that did not seem to make much difference for her.     ROS:     CONSTITUTIONAL:  Negative for chills and fever.      CARDIOVASCULAR:  Negative for chest pain and palpitations.      RESPIRATORY:  Negative for dyspnea.      GASTROINTESTINAL:  Negative for abdominal pain, nausea and vomiting.      INTEGUMENTARY/BREAST:  Negative for atypical mole(s) and rash.          PMH/FMH/SH:     Last Reviewed on 10/22/2019 09:59 AM by Waylon Tang    Past Medical History:                 PAST MEDICAL HISTORY         Hyperlipidemia    Hypertension     Anxiety             ADVANCED DIRECTIVES: None - Her , Lex, would make decisions for her if needed.         PREVENTIVE HEALTH MAINTENANCE             BONE DENSITY: was last done 12/12/11 with the following abnormality noted-- Osteopenia     COLORECTAL CANCER SCREENING: Up to date  (colonoscopy q10y; sigmoidoscopy q5y; Cologuard q3y) was last done 6/20/12, Results are in chart; colonoscopy with normal results     MAMMOGRAM: Done within last 2 years and results in are chart was last done 10/17/18 with normal results         Surgical History:         Appendectomy    Hysterectomy: partial;      carpal tunnel;    thoracic outlet - L;         Family History:     Father: Coronary Artery Disease     Mother: Breast Cancer         Social History:         Marital Status:      Children: 2 children         Tobacco/Alcohol/Supplements:     Last Reviewed on 10/22/2019 09:59 AM by Waylon Tang    Tobacco: She has never smoked.  Non-drinker         Substance Abuse History:     Last Reviewed on 10/22/2019 09:59 AM by Waylon Tang        Mental Health History:     Last Reviewed on 10/22/2019 09:59 AM by Waylon Tang        Communicable Diseases (eg STDs):     Last Reviewed on 10/22/2019 09:59 AM by Waylon Tang            Current Problems:     Last Reviewed on 10/22/2019 09:59 AM by Waylon Tang    Common migraine     Use of high risk medications     Anxiety     Hypertension     Hypercholesterolemia     Cough     Dysuria         Immunizations:     Fluzone vs. High Dose Fluzone 10/21/2012     zzPrevnar-13 8/12/2015     Fluzone (3 + years dose) 10/31/2007     Fluzone (3 + years dose) 10/21/2008     Fluzone (3 + years dose) 9/23/2009     Fluzone (3 + years dose) 9/17/2010     Fluzone (3 + years dose) 10/27/2011     Fluzone High-Dose pf (>=65 yr) 10/10/2013     Fluzone High-Dose pf (>=65 yr) 10/21/2012     Fluzone High-Dose pf (>=65 yr) 10/10/2014     Fluzone High-Dose pf (>=65 yr) 10/15/2015     Fluzone High-Dose pf (>=65 yr) 9/16/2016     Fluzone High-Dose pf (>=65 yr) 10/2/2017     Fluzone High-Dose pf (>=65 yr) 11/21/2018     Pneumococcal, 23-valent IM/SC (adult and pt >=2yr) 8/17/2012         Allergies:     Last Reviewed on 10/22/2019 09:59 AM by  Waylon Tang    Penicillins: hives        Current Medications:     Last Reviewed on 10/22/2019 09:59 AM by Waylon Tang    Lorazepam 0.5mg Tablet take 1/2 to 1 tablet every 12 hours as needed for anxiety     Singulair  10mg Tablet Take 1 tablet(s) by mouth each evening     Lisinopril/Hydrochlorothiazide 10mg/12.5mg Tablet Take 1 tablet(s) by mouth daily     Simvastatin 20mg Tablet Take 1 tablet(s) by mouth at bedtime     Zoloft 100mg Tablet Take 1 tablet(s) by mouth daily     Fosamax 70mg Tablet Take 1 tablet(s) by mouth q week     Amitriptyline HCl 100mg Tablet Take 1 tablet(s) by mouth at bedtime     Fluticasone Propionate         OBJECTIVE:        Vitals:         Current: 10/22/2019 9:27:25 AM    Ht:  5 ft, 5 in;  Wt: 150.6 lbs;  BMI: 25.1    T: 98.1 F (oral);  BP: 123/72 mm Hg (right arm, sitting);  P: 87 bpm (right arm (BP Cuff), sitting);  sCr: 1.39 mg/dL;  GFR: 37.01        Exams:     PHYSICAL EXAM:     GENERAL: vital signs recorded - well developed, well nourished;  no apparent distress;     EYES: extraocular movements intact; conjunctiva and cornea are normal; PERRL;     E/N/T:  normal EACs, TMs, nasal/oral mucosa, teeth, gingiva, and oropharynx;     NECK: range of motion is normal; thyroid is non-palpable;     RESPIRATORY: normal respiratory rate and pattern with no distress; normal breath sounds with no rales, rhonchi, wheezes or rubs;     CARDIOVASCULAR: normal rate; rhythm is regular;  no systolic murmur; no edema;     GASTROINTESTINAL: nontender; normal bowel sounds; no organomegaly;     LYMPHATIC: no enlargement of cervical or facial nodes; no supraclavicular nodes;     BREAST/INTEGUMENT: SKIN: no significant rashes or lesions; no suspicious moles;         Procedures:     Cough         Nebulizer: Albuterol Sulfate 0.083% Pre-treatment Pulse: 87; O2 Sat: 97% Post-treatment Pulse: 80; O2 Sat: 96% Lot# [id number]  Expiration: 10-20 Administered by: pr         Vaccination against other  3 viral diseases, Influenza     1. Influenza high dose 0.5 ml unit dose, SCS, ABN signed given IM in the right upper arm; administered by [employer];  lot number [id number]; expires 5/26/2020             ASSESSMENT           786.2   R05  Cough              DDx:     V04.81   Z23  Vaccination against other viral diseases, Influenza              DDx:         ORDERS:         Radiology/Test Orders:       83102  Radiologic exam chest 2 views  (Send-Out)         3014F  Screening mammography results documented and reviewed (PV)1  (In-House)         3017F  Colorectal CA screen results documented and reviewed (PV)  (In-House)           Procedures Ordered:       95559  Fluzone High Dose  (In-House)         35989  Bronchodilation responsiveness, spirometry as in 91801, pre- and post bronchodilator administration  (In-House)           Other Orders:       1100F  Pt screen for fall risk; document 2+ falls in the past yr or any fall w/injury in past year (STUART)  (In-House)           Administration of influenza virus vaccine (x1)         Albuterol, inhale soln, FDA-apprvd final, non-compounded, admin thru DME, unit dose 1 mg (x2.5)                 PLAN:          Cough         RADIOLOGY:  I have ordered a chest x-ray (PA and lateral) to be done today.      TESTS/PROCEDURES:  Will proceed with Pre and Post PFT with bronchodilator administration. to be performed/scheduled now.      RECOMMENDATIONS given include: Her exam is mostly normal.  I think her lungs sound pretty clear.  We will check X-ray of the chest and PFTs as noted.  Consider additional plan after that.  I might change her blood pressure medication given the lisinopril..  MIPS Has fallen 2+ times in the past year or had one fall with an injury in the past year Vaccines Flu and Pneumonia updated in Shot record Screening mammomgram done within last 2 years and results in are chart Colorectal Cancer Screening is up to date and the results are in the chart           Orders:        63218  Radiologic exam chest 2 views  (Send-Out)         80801  Bronchodilation responsiveness, spirometry as in 78734, pre- and post bronchodilator administration  (In-House)                     Albuterol, inhale soln, FDA-apprvd final, non-compounded, admin thru DME, unit dose 1 mg (x2.5)       1100F  Pt screen for fall risk; document 2+ falls in the past yr or any fall w/injury in past year (STUART)  (In-House)         3014F  Screening mammography results documented and reviewed (PV)1  (In-House)         3017F  Colorectal CA screen results documented and reviewed (PV)  (In-House)             Patient Education Handouts:       Cough           Vaccination against other viral diseases, Influenza           Orders:       04093  Fluzone High Dose  (In-House)                     Administration of influenza virus vaccine (x1)             CHARGE CAPTURE           **Please note: ICD descriptions below are intended for billing purposes only and may not represent clinical diagnoses**        Primary Diagnosis:         786.2 Cough            R05    Cough              Orders:          12659   Office/outpatient visit; established patient, level 4  (In-House)             66107   Bronchodilation responsiveness, spirometry as in 93045, pre- and post bronchodilator administration  (In-House)                                           Albuterol, inhale soln, FDA-apprvd final, non-compounded, admin thru DME, unit dose 1 mg (x2.5)           1100F   Pt screen for fall risk; document 2+ falls in the past yr or any fall w/injury in past year (STUART)  (In-House)             3014F   Screening mammography results documented and reviewed (PV)1  (In-House)             3017F   Colorectal CA screen results documented and reviewed (PV)  (In-House)           V04.81 Vaccination against other viral diseases, Influenza            Z23    Encounter for immunization              Orders:          25755   Fluzone High Dose  (In-House)                                            Administration of influenza virus vaccine (x1)

## 2021-05-18 NOTE — PROGRESS NOTES
Cuauhtemoc Davis  1945     Office/Outpatient Visit    Visit Date: Thu, Jun 25, 2020 02:31 pm    Provider: Waylon Tang MD (Assistant: Joya Deleon RN)    Location: [facility]        Electronically signed by Waylon Tang MD on  06/25/2020 09:25:14 PM                             Subjective:        CC: blood pressure, cholesterol, anxiety    HPI:           Mrs. Davis presents with mixed hyperlipidemia.  Current treatment includes Zocor.  Compliance with treatment has been good; she takes her medication as directed and follows up as directed.  She denies experiencing any hypercholesterolemia related symptoms.            With regard to the essential (primary) hypertension, her current cardiac medication regimen includes a combination medication ( Zestoretic ).  She is tolerating the medication well without side effects.            Cuauhtemoc is also in today for follow up on anxiety.  This is a chronic issue for her for which she uses lorazepam.  She always has lots of stressors related to her family and does not feel that we could make any change rightnow.  She also remains on generic Zoloft and tolerates it.  Risks are discussed with her again today.  Issac is okay.      ROS:     CONSTITUTIONAL:  Negative for chills and fever.      CARDIOVASCULAR:  Negative for chest pain and palpitations.      RESPIRATORY:  Negative for recent cough and dyspnea.      GASTROINTESTINAL:  Negative for abdominal pain, nausea and vomiting.      INTEGUMENTARY/BREAST:  Negative for atypical mole(s) and rash.          Past Medical History / Family History / Social History:         Last Reviewed on 12/13/2019 08:47 AM by Waylon Tang    Past Medical History:                 PAST MEDICAL HISTORY         Hyperlipidemia    Hypertension     Anxiety             ADVANCED DIRECTIVES: None - Her , Lex, would make decisions for her if needed.         PREVENTIVE HEALTH MAINTENANCE             BONE  DENSITY: was last done 11/18/19 with the following abnormality noted-- Osteopenia     COLORECTAL CANCER SCREENING: Up to date (colonoscopy q10y; sigmoidoscopy q5y; Cologuard q3y) was last done 6/20/12, Results are in chart; colonoscopy with normal results     MAMMOGRAM: Done within last 2 years and results in are chart was last done 11/18/19 with normal results         Surgical History:         Appendectomy    Hysterectomy: partial;     carpal tunnel;    thoracic outlet - L;         Family History:     Father: Coronary Artery Disease     Mother: Breast Cancer         Social History:         Marital Status:      Children: 2 children         Tobacco/Alcohol/Supplements:     Last Reviewed on 12/13/2019 08:47 AM by Waylon Tang    Tobacco: She has never smoked.  Non-drinker         Substance Abuse History:     Last Reviewed on 12/13/2019 08:47 AM by Waylon Tang        Mental Health History:     Last Reviewed on 12/13/2019 08:47 AM by Waylon Tang        Communicable Diseases (eg STDs):     Last Reviewed on 12/13/2019 08:47 AM by Waylon Tang        Current Problems:     Last Reviewed on 12/13/2019 08:47 AM by Waylon Tang    Mixed hyperlipidemia    Essential (primary) hypertension    Other mixed anxiety disorders    Other long term (current) drug therapy    Dysuria        Immunizations:     Fluzone vs. High Dose Fluzone 10/21/2012    zzPrevnar-13 8/12/2015    Fluzone (3 + years dose) 10/31/2007    Fluzone (3 + years dose) 10/21/2008    Fluzone (3 + years dose) 9/23/2009    Fluzone (3 + years dose) 9/17/2010    Fluzone (3 + years dose) 10/27/2011    Fluzone High-Dose pf (>=65 yr) 10/10/2013    Fluzone High-Dose pf (>=65 yr) 10/21/2012    Fluzone High-Dose pf (>=65 yr) 10/10/2014    Fluzone High-Dose pf (>=65 yr) 10/15/2015    Fluzone High-Dose pf (>=65 yr) 9/16/2016    Fluzone High-Dose pf (>=65 yr) 10/2/2017    Fluzone High-Dose pf (>=65 yr) 11/21/2018    Fluzone  High-Dose pf (>=65 yr) 10/22/2019    Pneumococcal, 23-valent IM/SC (adult and pt >=2yr) 8/17/2012        Allergies:     Last Reviewed on 12/13/2019 08:47 AM by Waylon Tang    lisinopril-hydrochlorothiazide:   (Adverse Reaction)    Penicillins: hives         Current Medications:     Last Reviewed on 12/13/2019 08:47 AM by Waylon Tang    amitriptyline 100 mg oral tablet [Take 1 tablet(s) by mouth at bedtime]    Simvastatin 20 mg oral tablet [Take 1 tablet(s) by mouth at bedtime]    Zoloft 100 mg oral tablet [Take 1 tablet(s) by mouth daily]    LORazepam 0.5 mg oral tablet [TAKE ONE-HALF TO ONE TABLET BY MOUTH EVERY 12 HOURS AS NEEDED FOR ANXIETY]    Fosamax 70 mg oral tablet [Take 1 tablet(s) by mouth q week]    Fluticasone Propionate     Singulair  10mg Tablet [Take 1 tablet(s) by mouth each evening]    Anoro Ellipta 62.5mcg/25mcg per 1blister Inhalation Powder [Take 1 inhalation(s) by mouth daily]    losartan-hydrochlorothiazide 100-25 mg oral tablet [take 1/2 tablet by oral route once daily]        Objective:        Vitals:         Current: 6/25/2020 2:35:03 PM    Ht:  5 ft, 5 in;  Wt: 150.4 lbs;  BMI: 25.0T: 96.7 F (temporal);  BP: 127/73 mm Hg (left arm, sitting);  P: 85 bpm (left arm (BP Cuff), sitting);  sCr: 1.59 mg/dL;  GFR: 31.88        Exams:     PHYSICAL EXAM:     GENERAL: vital signs recorded - well developed, well nourished;  no apparent distress;     EYES: extraocular movements intact; conjunctiva and cornea are normal; PERRL;     NECK: range of motion is normal; thyroid is non-palpable;     RESPIRATORY: normal respiratory rate and pattern with no distress; normal breath sounds with no rales, rhonchi, wheezes or rubs;     CARDIOVASCULAR: normal rate; rhythm is regular;  no systolic murmur; no edema;     GASTROINTESTINAL: nontender; normal bowel sounds; no organomegaly;     LYMPHATIC: no enlargement of cervical or facial nodes; no supraclavicular nodes;     BREAST/INTEGUMENT: SKIN:  no significant rashes or lesions; no suspicious moles;     NEUROLOGIC: mental status: alert and oriented x 3; cranial nerves II-XII grossly intact;     PSYCHIATRIC: appropriate affect and demeanor; normal psychomotor function;         Lab/Test Results:         Amphetamines Screen, Urin: Negative (06/25/2020),     BAR-Barbiturates Screen, Urin: Negative (06/25/2020),     Buprenorphine: Negative (06/25/2020),     BZO-Benzodiazepines Screen,Ur: Positive (06/25/2020),     Cocaine(Metab.)Screen, Ur: Negative (06/25/2020),     MDMA-Ecstasy: Negative (06/25/2020),     Met-Methamphetamine: Negative (06/25/2020),     MTD-Methadone Screen, Urine: Negative (06/25/2020),     Opiate Screen, Urine: Negative (06/25/2020),     OXY-Oxycodone: Negative (06/25/2020),     PCP-Phencyclidine Screen, Uri: Negative (06/25/2020),     THC Cannabinoids Screen, Urin: Negative (06/25/2020),     Urine temperature: confirmed (06/25/2020),     Date and time of last pill: lorazepam-6/25/20 @ 10am/amyh (06/25/2020),     Performed by: ML (06/25/2020),     Collection Time: 1458 (06/25/2020),             Assessment:         E78.2   Mixed hyperlipidemia       I10   Essential (primary) hypertension       F41.3   Other mixed anxiety disorders       Z79.899   Other long term (current) drug therapy           ORDERS:         Meds Prescribed:       [Refilled] amitriptyline 100 mg oral tablet [Take 1 tablet(s) by mouth at bedtime], #90 (ninety) tablets, Refills: 1 (one)       [Refilled] Simvastatin 20 mg oral tablet [Take 1 tablet(s) by mouth at bedtime], #90 (ninety) tablets, Refills: 1 (one)       [Refilled] Zoloft 100 mg oral tablet [Take 1 tablet(s) by mouth daily], #90 (ninety) tablets, Refills: 1 (one)       [Refilled] LORazepam 0.5 mg oral tablet [TAKE ONE-HALF TO ONE TABLET BY MOUTH EVERY 12 HOURS AS NEEDED FOR ANXIETY], #30 (thirty) tablets, Refills: 2 (two)       [Refilled] losartan-hydrochlorothiazide 100-25 mg oral tablet [take 1/2 tablet by oral  route once daily], #45 (forty five) tablets, Refills: 1 (one)         Radiology/Test Orders:       3017F  Colorectal CA screen results documented and reviewed (PV)  (In-House)              Lab Orders:       [id number]  Drug test prsmv qual dir optical obs per day  (In-House)            42881  [facility] Comp. Metabolic Panel  (Send-Out)              Other Orders:         Depression screen negative  (In-House)            1100F  Pt screen for fall risk; document 2+ falls in the past yr or any fall w/injury in past year (STUART)  (In-House)              Screening mammogram results documented  (Send-Out)            1124F  Advance Care Planning discussed and doc in MR; no surrogate named or advance care plan provided  (Send-Out)                      Plan:         Mixed hyperlipidemia    LABORATORY:  Labs ordered to be performed today include Comprehensive metabolic panel.      RECOMMENDATIONS given include: She is well today.  We will continue current medications and arrange labs as noted below.  No changes are anticipated for now.  Drug screen today..  MIPS PHQ-9 Depression Screening: Completed form scanned and in chart; Total Score 7; Negative Depression Screen           Prescriptions:       [Refilled] amitriptyline 100 mg oral tablet [Take 1 tablet(s) by mouth at bedtime], #90 (ninety) tablets, Refills: 1 (one)       [Refilled] Simvastatin 20 mg oral tablet [Take 1 tablet(s) by mouth at bedtime], #90 (ninety) tablets, Refills: 1 (one)       [Refilled] Zoloft 100 mg oral tablet [Take 1 tablet(s) by mouth daily], #90 (ninety) tablets, Refills: 1 (one)       [Refilled] LORazepam 0.5 mg oral tablet [TAKE ONE-HALF TO ONE TABLET BY MOUTH EVERY 12 HOURS AS NEEDED FOR ANXIETY], #30 (thirty) tablets, Refills: 2 (two)       [Refilled] losartan-hydrochlorothiazide 100-25 mg oral tablet [take 1/2 tablet by oral route once daily], #45 (forty five) tablets, Refills: 1 (one)           Orders:       15650  [facility] - [facility] Comp.  Metabolic Panel  (Send-Out)              Depression screen negative  (In-House)            1100F  Pt screen for fall risk; document 2+ falls in the past yr or any fall w/injury in past year (STUART)  (In-House)              Screening mammogram results documented  (Send-Out)            3017F  Colorectal CA screen results documented and reviewed (PV)  (In-House)            1124F  Advance Care Planning discussed and doc in MR; no surrogate named or advance care plan provided  (Send-Out)              Essential (primary) hypertensionAs above.        Other mixed anxiety disordersAs above.        Other long term (current) drug therapyAs above.    LABORATORY:  Labs ordered to be performed today include Drug screen.            Orders:       [id number]  Drug test prsmv qual dir optical obs per day  (In-House)                  Charge Capture:         Primary Diagnosis:     E78.2  Mixed hyperlipidemia           Orders:      68185  Office/outpatient visit; established patient, level 4  (In-House)              Depression screen negative  (In-House)            1100F  Pt screen for fall risk; document 2+ falls in the past yr or any fall w/injury in past year (STUART)  (In-House)            3017F  Colorectal CA screen results documented and reviewed (PV)  (In-House)              I10  Essential (primary) hypertension     F41.3  Other mixed anxiety disorders     Z79.899  Other long term (current) drug therapy           Orders:      41334  Drug test prsmv qual dir optical obs per day  (In-House)

## 2021-05-18 NOTE — PROGRESS NOTES
Cuauhtemoc Davis 1945     Office/Outpatient Visit    Visit Date: Fri, May 4, 2018 10:05 am    Provider: Waylon Tang MD (Assistant: Staci Bonner)    Location: [facility]        Electronically signed by Waylon Tang MD on  05/04/2018 12:49:55 PM                             SUBJECTIVE:        CC: blood pressure, cholesterol, anxiety         HPI:         Mrs. Davis presents with hypertension.  Her current cardiac medication regimen includes a combination medication ( Zestoretic ).  She is tolerating the medication well without side effects.          In regard to the hypercholesterolemia, current treatment includes Zocor.  Compliance with treatment has been good; she takes her medication as directed and follows up as directed.  She denies experiencing any hypercholesterolemia related symptoms.          Cuauhtemoc is also in today for follow up on anxiety.  She has significant anxiety for which she uses lorazepam on a sparing basis.  She may take 2-3 of these per week depending on how she is doing.  She last had this filled in October of 2017.  She has several remaining pills in her bottle presently.  She also remains on generic Zoloft.      ROS:     CONSTITUTIONAL:  Negative for chills and fever.      CARDIOVASCULAR:  Negative for chest pain and palpitations.      RESPIRATORY:  Negative for recent cough and dyspnea.      GASTROINTESTINAL:  Negative for abdominal pain, nausea and vomiting.          PM/[facility]/SH:     Last Reviewed on 5/04/2018 10:14 AM by Waylon Tang    Past Medical History:                 PAST MEDICAL HISTORY             PREVENTIVE HEALTH MAINTENANCE             COLORECTAL CANCER SCREENING: Up to date (colonoscopy q10y; sigmoidoscopy q5y; Cologuard q3y) was last done 05/2012, Results are in chart; colonoscopy with normal results     MAMMOGRAM: Done within last 2 years and results in are chart was last done 10/2017 with normal results         Surgical History:         Appendectomy     Hysterectomy: partial;      carpal tunnel;    thoracic outlet - L;         Family History:         Positive for Coronary Artery Disease ( father ).      Positive for Breast Cancer ( mother; mat. aunt ).          Social History:         Marital Status:      Children: 2 children         Tobacco/Alcohol/Supplements:     Last Reviewed on 5/04/2018 10:14 AM by Waylon Tang    Tobacco: She has never smoked.  Non-drinker         Substance Abuse History:     Last Reviewed on 5/04/2018 10:14 AM by Waylon Tang        Mental Health History:     Last Reviewed on 5/04/2018 10:14 AM by Waylon Tang        Communicable Diseases (eg STDs):     Last Reviewed on 5/04/2018 10:14 AM by Waylon Tang            Current Problems:     Last Reviewed on 5/04/2018 10:14 AM by Waylon Tang    Common migraine     Use of high risk medications     Anxiety     Hypertension     Hypercholesterolemia         Immunizations:     Fluzone vs. High Dose Fluzone 10/21/2012     zzPrevnar-13 8/12/2015     Fluzone (3 + years dose) 10/31/2007     Fluzone (3 + years dose) 10/21/2008     Fluzone (3 + years dose) 9/23/2009     Fluzone (3 + years dose) 9/17/2010     Fluzone (3 + years dose) 10/27/2011     Fluzone High-Dose pf (>=65 yr) 10/10/2013     Fluzone High-Dose pf (>=65 yr) 10/21/2012     Fluzone High-Dose pf (>=65 yr) 10/10/2014     Fluzone High-Dose pf (>=65 yr) 10/15/2015     Fluzone High-Dose pf (>=65 yr) 9/16/2016     Fluzone High-Dose pf (>=65 yr) 10/2/2017     Pneumococcal, 23-valent IM/SC (adult and pt >=2yr) 8/17/2012         Allergies:     Last Reviewed on 5/04/2018 10:14 AM by Waylon Tang    Penicillins: hives        Current Medications:     Last Reviewed on 5/04/2018 10:14 AM by Waylon Tang    Zoloft 100mg Tablet Take 1 tablet(s) by mouth daily     Simvastatin 20mg Tablet Take 1 tablet(s) by mouth at bedtime     Fosamax 70mg Tablet Take 1 tablet(s) by mouth q week      Lorazepam 0.5mg Tablet take 1/2 to 1 tablet every 12 hours as needed for anxiety     Amitriptyline HCl 100mg Tablet Take 1 tablet(s) by mouth at bedtime     Lisinopril/Hydrochlorothiazide 10mg/12.5mg Tablet Take 1 tablet(s) by mouth daily     Fluticasone Propionate         OBJECTIVE:        Vitals:         Current: 5/4/2018 10:09:04 AM    Ht:  5 ft, 5 in;  Wt: 152.1 lbs;  BMI: 25.3    T: 98.1 F (oral);  BP: 126/79 mm Hg (left arm, sitting);  P: 103 bpm (left arm (BP Cuff), sitting);  sCr: 1.55 mg/dL;  GFR: 34.28        Exams:     PHYSICAL EXAM:     GENERAL: vital signs recorded - well developed, well nourished;  no apparent distress;     EYES: extraocular movements intact; conjunctiva and cornea are normal; PERRLA;     E/N/T:  normal EACs, TMs, nasal/oral mucosa, teeth, gingiva, and oropharynx;     NECK: range of motion is normal; thyroid is non-palpable;     RESPIRATORY: normal respiratory rate and pattern with no distress; normal breath sounds with no rales, rhonchi, wheezes or rubs;     CARDIOVASCULAR: normal rate; rhythm is regular;  no systolic murmur; no edema;     GASTROINTESTINAL: nontender; normal bowel sounds; no organomegaly;     BREAST/INTEGUMENT: SKIN: no significant rashes or lesions; no suspicious moles;     PSYCHIATRIC:  appropriate affect and demeanor; normal speech pattern; grossly normal memory;         Lab/Test Results:             Urine temperature:  confirmed (05/04/2018),     All urine drug screen levels confirmed negative:  yes (05/04/2018),     Date and time of last pill:  Lorazepam 2 weeks ago (05/04/2018),     Performed by:  tls (05/04/2018),     Collection Time:  10:22 (05/04/2018),             ASSESSMENT           401.1   I10  Hypertension              DDx:     272.0   E78.4  Hypercholesterolemia              DDx:     V58.69   Z79.899  Use of high risk medications              DDx:     300.02   F41.3  Anxiety              DDx:         ORDERS:         Meds Prescribed:       Refill of:  Zoloft (Sertraline HCl) 100mg Tablet Take 1 tablet(s) by mouth daily  #90 (Ninety) tablet(s) Refills: 1       Refill of: Simvastatin 20mg Tablet Take 1 tablet(s) by mouth at bedtime  #90 (Ninety) tablet(s) Refills: 1       Refill of: Amitriptyline HCl 100mg Tablet Take 1 tablet(s) by mouth at bedtime  #90 (Ninety) tablet(s) Refills: 1       Refill of: Lisinopril/Hydrochlorothiazide 10mg/12.5mg Tablet Take 1 tablet(s) by mouth daily  #90 (Ninety) tablet(s) Refills: 1         Lab Orders:       [id number]  Drug test prsmv qual dir optical obs per day  (In-House)         [id number]  HTN - [facility] CMP AND LIPID: [id number]  (Send-Out)                   PLAN:          Hypertension         RECOMMENDATIONS given include: Today, we have again reviewed her care.  I'm going to arrange for repeat labs near the end of the month.  She is to have labs done then by nephrology.  No other changes are anticipated.  We will see what the labs show and go from there.  With regard to anxiety, risks of lorazepam are reviewed including addiction, impairment, and overdose risk.  She will continue to use just sparingly..            Prescriptions:       Refill of: Lisinopril/Hydrochlorothiazide 10mg/12.5mg Tablet Take 1 tablet(s) by mouth daily  #90 (Ninety) tablet(s) Refills: 1           Patient Education Handouts:       [facility] Medication Compliance           Hypercholesterolemia As above.     LABORATORY:  Labs ordered to be performed today include HTN/Lipid Panel: CMP, Lipid.            Prescriptions:       Refill of: Simvastatin 20mg Tablet Take 1 tablet(s) by mouth at bedtime  #90 (Ninety) tablet(s) Refills: 1           Orders:       [id number]  HTN - [facility] CMP AND LIPID: [id number]  (Send-Out)            Use of high risk medications As above.     LABORATORY:  Labs ordered to be performed today include Drug screen.            Orders:       [id number]  Drug test prsmv qual dir optical obs per day  (In-House)            Anxiety As above.            Prescriptions:       Refill of: Zoloft (Sertraline HCl) 100mg Tablet Take 1 tablet(s) by mouth daily  #90 (Ninety) tablet(s) Refills: 1       Refill of: Amitriptyline HCl 100mg Tablet Take 1 tablet(s) by mouth at bedtime  #90 (Ninety) tablet(s) Refills: 1             CHARGE CAPTURE           **Please note: ICD descriptions below are intended for billing purposes only and may not represent clinical diagnoses**        Primary Diagnosis:         401.1 Hypertension            I10    Essential (primary) hypertension              Orders:          [id number]   Office/outpatient visit; established patient, level 4  (In-House)           272.0 Hypercholesterolemia            E78.4    Other hyperlipidemia    V58.69 Use of high risk medications            Z79.899    Other long term (current) drug therapy              Orders:          [id number]   Drug test prsmv qual dir optical obs per day  (In-House)           300.02 Anxiety            F41.3    Other mixed anxiety disorders

## 2021-07-01 VITALS
TEMPERATURE: 98.1 F | BODY MASS INDEX: 25.36 KG/M2 | DIASTOLIC BLOOD PRESSURE: 81 MMHG | SYSTOLIC BLOOD PRESSURE: 111 MMHG | HEIGHT: 65 IN | HEART RATE: 87 BPM | WEIGHT: 152.2 LBS

## 2021-07-01 VITALS
SYSTOLIC BLOOD PRESSURE: 126 MMHG | TEMPERATURE: 98.1 F | WEIGHT: 152.1 LBS | HEART RATE: 103 BPM | DIASTOLIC BLOOD PRESSURE: 79 MMHG | HEIGHT: 65 IN | BODY MASS INDEX: 25.34 KG/M2

## 2021-07-01 VITALS
SYSTOLIC BLOOD PRESSURE: 113 MMHG | TEMPERATURE: 97.8 F | DIASTOLIC BLOOD PRESSURE: 69 MMHG | HEIGHT: 65 IN | WEIGHT: 145.8 LBS | HEART RATE: 84 BPM | BODY MASS INDEX: 24.29 KG/M2

## 2021-07-01 VITALS
HEART RATE: 87 BPM | BODY MASS INDEX: 25.43 KG/M2 | DIASTOLIC BLOOD PRESSURE: 83 MMHG | SYSTOLIC BLOOD PRESSURE: 121 MMHG | HEIGHT: 65 IN | WEIGHT: 152.6 LBS | TEMPERATURE: 98.3 F

## 2021-07-01 VITALS
BODY MASS INDEX: 25.33 KG/M2 | TEMPERATURE: 97.9 F | HEIGHT: 65 IN | WEIGHT: 152 LBS | HEART RATE: 96 BPM | SYSTOLIC BLOOD PRESSURE: 119 MMHG | DIASTOLIC BLOOD PRESSURE: 84 MMHG

## 2021-07-01 VITALS
SYSTOLIC BLOOD PRESSURE: 129 MMHG | BODY MASS INDEX: 24.79 KG/M2 | HEART RATE: 78 BPM | TEMPERATURE: 98.7 F | DIASTOLIC BLOOD PRESSURE: 73 MMHG | HEIGHT: 65 IN | WEIGHT: 148.8 LBS

## 2021-07-01 VITALS
HEIGHT: 65 IN | TEMPERATURE: 98.1 F | HEART RATE: 87 BPM | BODY MASS INDEX: 25.09 KG/M2 | WEIGHT: 150.6 LBS | SYSTOLIC BLOOD PRESSURE: 123 MMHG | DIASTOLIC BLOOD PRESSURE: 72 MMHG

## 2021-07-01 VITALS
TEMPERATURE: 98 F | HEART RATE: 105 BPM | WEIGHT: 151.6 LBS | DIASTOLIC BLOOD PRESSURE: 77 MMHG | SYSTOLIC BLOOD PRESSURE: 123 MMHG | HEIGHT: 65 IN | BODY MASS INDEX: 25.26 KG/M2

## 2021-07-01 VITALS
BODY MASS INDEX: 25.29 KG/M2 | HEART RATE: 108 BPM | DIASTOLIC BLOOD PRESSURE: 67 MMHG | HEIGHT: 65 IN | SYSTOLIC BLOOD PRESSURE: 99 MMHG | WEIGHT: 151.8 LBS | TEMPERATURE: 98.3 F

## 2021-07-01 VITALS
HEIGHT: 65 IN | WEIGHT: 148.2 LBS | TEMPERATURE: 98.8 F | SYSTOLIC BLOOD PRESSURE: 126 MMHG | DIASTOLIC BLOOD PRESSURE: 77 MMHG | HEART RATE: 84 BPM | BODY MASS INDEX: 24.69 KG/M2

## 2021-07-02 VITALS
HEART RATE: 85 BPM | TEMPERATURE: 96.7 F | WEIGHT: 150.4 LBS | DIASTOLIC BLOOD PRESSURE: 73 MMHG | BODY MASS INDEX: 25.06 KG/M2 | HEIGHT: 65 IN | SYSTOLIC BLOOD PRESSURE: 127 MMHG

## 2021-07-02 VITALS
SYSTOLIC BLOOD PRESSURE: 143 MMHG | WEIGHT: 147.4 LBS | BODY MASS INDEX: 24.56 KG/M2 | HEIGHT: 65 IN | TEMPERATURE: 96.8 F | DIASTOLIC BLOOD PRESSURE: 77 MMHG | HEART RATE: 72 BPM

## 2021-07-02 VITALS
HEIGHT: 65 IN | DIASTOLIC BLOOD PRESSURE: 74 MMHG | HEART RATE: 100 BPM | TEMPERATURE: 98.5 F | SYSTOLIC BLOOD PRESSURE: 114 MMHG | WEIGHT: 149.4 LBS | BODY MASS INDEX: 24.89 KG/M2

## 2021-07-14 RX ORDER — ALENDRONATE SODIUM 70 MG/1
1 TABLET ORAL WEEKLY
COMMUNITY
End: 2021-12-07

## 2021-07-14 RX ORDER — LORAZEPAM 0.5 MG/1
1 TABLET ORAL 2 TIMES DAILY
COMMUNITY
End: 2021-07-26

## 2021-07-14 RX ORDER — SERTRALINE HYDROCHLORIDE 100 MG/1
1 TABLET, FILM COATED ORAL DAILY
COMMUNITY
End: 2021-11-05 | Stop reason: SDUPTHER

## 2021-07-14 RX ORDER — LOSARTAN POTASSIUM AND HYDROCHLOROTHIAZIDE 25; 100 MG/1; MG/1
1 TABLET ORAL DAILY
COMMUNITY
End: 2022-02-11 | Stop reason: SDUPTHER

## 2021-07-14 RX ORDER — AMITRIPTYLINE HYDROCHLORIDE 100 MG/1
1 TABLET, FILM COATED ORAL DAILY
COMMUNITY
End: 2022-02-11 | Stop reason: SDUPTHER

## 2021-07-14 RX ORDER — SIMVASTATIN 20 MG
1 TABLET ORAL DAILY
COMMUNITY
End: 2022-02-11 | Stop reason: SDUPTHER

## 2021-07-23 ENCOUNTER — HOSPITAL ENCOUNTER (OUTPATIENT)
Dept: GENERAL RADIOLOGY | Facility: HOSPITAL | Age: 76
Discharge: HOME | End: 2021-07-23

## 2021-07-23 ENCOUNTER — TELEPHONE (OUTPATIENT)
Dept: FAMILY MEDICINE CLINIC | Age: 76
End: 2021-07-23

## 2021-07-23 ENCOUNTER — OFFICE VISIT (OUTPATIENT)
Dept: FAMILY MEDICINE CLINIC | Age: 76
End: 2021-07-23

## 2021-07-23 VITALS
WEIGHT: 150.6 LBS | DIASTOLIC BLOOD PRESSURE: 90 MMHG | BODY MASS INDEX: 25.09 KG/M2 | HEART RATE: 80 BPM | TEMPERATURE: 98.6 F | SYSTOLIC BLOOD PRESSURE: 144 MMHG | HEIGHT: 65 IN

## 2021-07-23 DIAGNOSIS — M79.651 PAIN OF RIGHT THIGH: ICD-10-CM

## 2021-07-23 DIAGNOSIS — M25.561 CHRONIC PAIN OF RIGHT KNEE: ICD-10-CM

## 2021-07-23 DIAGNOSIS — S72.326A: Primary | ICD-10-CM

## 2021-07-23 DIAGNOSIS — G89.29 CHRONIC PAIN OF RIGHT KNEE: Primary | ICD-10-CM

## 2021-07-23 DIAGNOSIS — M25.561 CHRONIC PAIN OF RIGHT KNEE: Primary | ICD-10-CM

## 2021-07-23 DIAGNOSIS — G89.29 CHRONIC PAIN OF RIGHT KNEE: ICD-10-CM

## 2021-07-23 PROCEDURE — 99213 OFFICE O/P EST LOW 20 MIN: CPT | Performed by: FAMILY MEDICINE

## 2021-07-23 PROCEDURE — 73552 X-RAY EXAM OF FEMUR 2/>: CPT

## 2021-07-23 PROCEDURE — 73562 X-RAY EXAM OF KNEE 3: CPT

## 2021-07-23 NOTE — TELEPHONE ENCOUNTER
----- Message from Waylon Tang MD sent at 7/23/2021  2:07 PM EDT -----  Please let Theresa know that she likely suffered some kind of fracture of the right femur.  This is an unusual that this is present and she is able to walk at all.  I would recommend urgent referral to orthopedics.  She should not bear weight on the leg until she can be seen.  It is okay to refer to Dr. Galeano or over to Debi if needed for timing.

## 2021-07-23 NOTE — PROGRESS NOTES
Theresa Davis presents to [facility].    Chief Complaint:  R leg pain    Subjective       History of Present Illness:  Theresa is in today for follow-up on right leg pain she says that she is fallen with the leg several times, but she most recently fell about 2 weeks ago.  She has pain in the right knee and a twisting feeling just above the knee.  She has no history of previous surgery in the knee or that right lower extremity.      Review of Systems:  Review of Systems   Constitutional: Negative for chills and fever.   Respiratory: Negative for cough and shortness of breath.    Cardiovascular: Negative for chest pain and palpitations.   Gastrointestinal: Negative for abdominal pain, nausea and vomiting.        Objective   Medical History:  Past Medical History:   • Anemia, unspecified   • Cervicalgia   • Essential (primary) hypertension   • Mixed hyperlipidemia   • Other long term (current) drug therapy   • Other mixed anxiety disorders     Past Surgical History:   • APPENDECTOMY   • CARPAL TUNNEL RELEASE   • COLONOSCOPY    Normal   • HYSTERECTOMY    PARTIAL   • THORACIC OUTLET SURGERY      Family History   Problem Relation Age of Onset   • Breast cancer Mother    • Coronary artery disease Father      Social History     Tobacco Use   • Smoking status: Never Smoker   • Smokeless tobacco: Never Used   Substance Use Topics   • Alcohol use: Never       Health Maintenance Due   Topic Date Due   • ANNUAL PHYSICAL  Never done   • TDAP/TD VACCINES (1 - Tdap) Never done   • ZOSTER VACCINE (1 of 2) Never done   • COLORECTAL CANCER SCREENING  06/26/2020   • HEPATITIS C SCREENING  Never done        Immunization History   Administered Date(s) Administered   • COVID-19 (PFIZER) 03/09/2021, 03/30/2021   • Fluzone High Dose =>65 Years ([employer] ONLY) 10/21/2012, 10/10/2013, 10/02/2017   • Influenza, Unspecified 09/23/2020   • Pneumococcal Conjugate 13-Valent (PCV13) 08/12/2015   • Pneumococcal  "Polysaccharide (PPSV23) 08/17/2012, 08/17/2012       Allergies   Allergen Reactions   • Sulfa Antibiotics Unknown - Low Severity   • Penicillins Rash        Medications:  Current Outpatient Medications on File Prior to Visit   Medication Sig   • alendronate (FOSAMAX) 70 MG tablet Take 1 tablet by mouth 1 (One) Time Per Week.   • amitriptyline (ELAVIL) 100 MG tablet Take 1 tablet by mouth Daily.   • LORazepam (ATIVAN) 0.5 MG tablet Take 1 tablet by mouth 2 (two) times a day.   • losartan-hydrochlorothiazide (HYZAAR) 100-25 MG per tablet Take 1 tablet by mouth Daily.   • sertraline (ZOLOFT) 100 MG tablet Take 1 tablet by mouth Daily.   • simvastatin (ZOCOR) 20 MG tablet Take 1 tablet by mouth Daily.     No current facility-administered medications on file prior to visit.       Vital Signs:   /90 (BP Location: Right arm, Patient Position: Sitting)   Pulse 80   Temp 98.6 °F (37 °C) (Oral)   Ht 165.1 cm (65\")   Wt 68.3 kg (150 lb 9.6 oz)   BMI 25.06 kg/m²       Physical Exam:  Physical Exam  Vitals reviewed.   Constitutional:       General: She is not in acute distress.     Appearance: She is not ill-appearing.   Eyes:      Pupils: Pupils are equal, round, and reactive to light.   Neck:      Comments: No thyromegaly  Cardiovascular:      Rate and Rhythm: Normal rate and regular rhythm.   Pulmonary:      Effort: Pulmonary effort is normal.      Breath sounds: Normal breath sounds.   Abdominal:      General: There is no distension.      Palpations: Abdomen is soft.      Tenderness: There is no abdominal tenderness.   Musculoskeletal:      Cervical back: Neck supple.      Comments: There is some crepitus of the right knee with range of motion.  No obvious joint effusion or redness is appreciated.  Negative Bridget testing.  No focal tenderness of the right thigh.   Lymphadenopathy:      Cervical: No cervical adenopathy.   Skin:     Findings: No lesion or rash.   Neurological:      Mental Status: She is alert. "         Result Review      The following data was reviewed by Waylon Tang MD on 07/23/2021.  Lab Results   Component Value Date    WBC 6.27 10/22/2020    HGB 11.5 (L) 10/22/2020    HCT 33.5 (L) 10/22/2020    MCV 90.1 10/22/2020     10/22/2020     Lab Results   Component Value Date    BUN 21 10/22/2020    CREATININE 1.45 (H) 10/22/2020    BCR 14 10/22/2020    K 3.6 10/22/2020    CO2 26 10/22/2020    CALCIUM 9.8 10/22/2020    ALBUMIN 4.6 10/22/2020    LABIL2 1.5 10/22/2020    AST 20 10/22/2020    ALT 14 10/22/2020     Lab Results   Component Value Date    CHLPL 190 10/22/2020    TRIG 153 (H) 10/22/2020    HDL 62 (H) 10/22/2020    LDL 97 10/22/2020     Lab Results   Component Value Date    TSH 3.510 10/22/2020     No results found for: HGBA1C           Assessment and Plan:   Today, we have reviewed Shirley's care.  I am going to recommend an x-ray of the right knee and thigh.  We will see what this testing shows.  I would lean toward a referral to physical therapy for additional evaluation given the ongoing pain.  We might consider referral to orthopedics or possibly MRI if she does not see improvement over time here.       Diagnoses and all orders for this visit:    1. Chronic pain of right knee (Primary)  -     XR Knee 3 View Right; Future    2. Pain of right thigh  -     XR Femur 2 View Right; Future          Follow Up   Return if symptoms worsen or fail to improve.  Patient was given instructions and counseling regarding her condition or for health maintenance advice. Please see specific information pulled into the AVS if appropriate.

## 2021-07-26 RX ORDER — LORAZEPAM 0.5 MG/1
TABLET ORAL
Qty: 30 TABLET | Refills: 1 | Status: SHIPPED | OUTPATIENT
Start: 2021-07-26 | End: 2021-09-16

## 2021-08-05 ENCOUNTER — OFFICE VISIT (OUTPATIENT)
Dept: ORTHOPEDIC SURGERY | Facility: CLINIC | Age: 76
End: 2021-08-05

## 2021-08-05 VITALS — BODY MASS INDEX: 25.12 KG/M2 | HEART RATE: 68 BPM | HEIGHT: 65 IN | OXYGEN SATURATION: 95 % | WEIGHT: 150.8 LBS

## 2021-08-05 DIAGNOSIS — M17.11 PRIMARY OSTEOARTHRITIS OF RIGHT KNEE: Primary | ICD-10-CM

## 2021-08-05 PROCEDURE — 99203 OFFICE O/P NEW LOW 30 MIN: CPT | Performed by: ORTHOPAEDIC SURGERY

## 2021-08-05 PROCEDURE — 20610 DRAIN/INJ JOINT/BURSA W/O US: CPT | Performed by: ORTHOPAEDIC SURGERY

## 2021-08-05 RX ORDER — LIDOCAINE HYDROCHLORIDE 10 MG/ML
9 INJECTION, SOLUTION INFILTRATION; PERINEURAL
Status: COMPLETED | OUTPATIENT
Start: 2021-08-05 | End: 2021-08-05

## 2021-08-05 RX ORDER — METHYLPREDNISOLONE ACETATE 80 MG/ML
80 INJECTION, SUSPENSION INTRA-ARTICULAR; INTRALESIONAL; INTRAMUSCULAR; SOFT TISSUE
Status: COMPLETED | OUTPATIENT
Start: 2021-08-05 | End: 2021-08-05

## 2021-08-05 RX ADMIN — LIDOCAINE HYDROCHLORIDE 9 ML: 10 INJECTION, SOLUTION INFILTRATION; PERINEURAL at 12:05

## 2021-08-05 RX ADMIN — METHYLPREDNISOLONE ACETATE 80 MG: 80 INJECTION, SUSPENSION INTRA-ARTICULAR; INTRALESIONAL; INTRAMUSCULAR; SOFT TISSUE at 12:05

## 2021-08-05 NOTE — PROGRESS NOTES
"Chief Complaint  Pain of the Right Knee     Subjective      Theresa Davis presents to [facility] for evaluation of her right knee. The patient reports she has had multiple falls over the last several years. The patient reports her knee has pain with activity and she reports instability. She has no new specific injury or trauma. She has no other complaints. She has had no treatment for her knee.     Allergies   Allergen Reactions   • Sulfa Antibiotics Unknown - Low Severity   • Penicillins Rash        Social History     Socioeconomic History   • Marital status:      Spouse name: Not on file   • Number of children: 2   • Years of education: Not on file   • Highest education level: Not on file   Tobacco Use   • Smoking status: Never Smoker   • Smokeless tobacco: Never Used   Vaping Use   • Vaping Use: Never used   Substance and Sexual Activity   • Alcohol use: Never   • Drug use: Never        Review of Systems     Objective   Vital Signs:   Pulse 68   Ht 165.1 cm (65\")   Wt 68.4 kg (150 lb 12.8 oz)   SpO2 95%   BMI 25.09 kg/m²       Physical Exam  Constitutional:       Appearance: Normal appearance. He is well-developed and normal weight.   HENT:      Head: Normocephalic.      Right Ear: Hearing and external ear normal.      Left Ear: Hearing and external ear normal.      Nose: Nose normal.   Eyes:      Conjunctiva/sclera: Conjunctivae normal.   Cardiovascular:      Rate and Rhythm: Normal rate.   Pulmonary:      Effort: Pulmonary effort is normal.      Breath sounds: No wheezing or rales.   Abdominal:      Palpations: Abdomen is soft.      Tenderness: There is no abdominal tenderness.   Musculoskeletal:      Cervical back: Normal range of motion.   Skin:     Findings: No rash.   Neurological:      Mental Status: He is alert and oriented to person, place, and time.   Psychiatric:         Mood and Affect: Mood and affect normal.         Judgment: Judgment normal.       Ortho Exam  "     Right knee- ROM 0-130 degrees. Stable to varus/valgus stress. Stable to anterior/posterior drawer. No effusion. No skin discoloration, atrophy or swelling. Non-tender. Neurovascularly intact.     Large Joint Arthrocentesis: R knee  Date/Time: 8/5/2021 12:05 PM  Consent given by: patient  Site marked: site marked  Timeout: Immediately prior to procedure a time out was called to verify the correct patient, procedure, equipment, support staff and site/side marked as required   Supporting Documentation  Indications: pain   Procedure Details  Location: knee - R knee  Needle gauge: 21 G.  Medications administered: 9 mL lidocaine 1 %; 80 mg methylPREDNISolone acetate 80 MG/ML  Patient tolerance: patient tolerated the procedure well with no immediate complications            Imaging Results (Most Recent)     None           Result Review :       XR Femur 2 View Right    Result Date: 7/23/2021  Narrative: PROCEDURE: XR FEMUR 2 VW RIGHT  COMPARISON: None  INDICATIONS: ANTERIOR RIGHT FEMUR PAIN AFTER FALL X 1 MONTH  FINDINGS:  There is an incomplete subacute transverse fracture of the anterolateral aspect of the mid shaft of the right femur.  There is a fracture line seen through the anterolateral cortex with periosteal reaction consistent with healing.  There are mild degenerative changes involving the right hip joint and the patellofemoral joint.  The soft tissues are unremarkable.  CONCLUSION: Incomplete healing subacute transverse fracture of the anterior lateral aspect of the mid shaft of the right femur.      KAYE MELLO MD       Electronically Signed and Approved By: KAYE MELLO MD on 7/23/2021 at 14:01             XR Knee 3 View Right    Result Date: 7/23/2021  Narrative: PROCEDURE: XR KNEE 3 VW RIGHT  COMPARISON: None  INDICATIONS: CHRONIC GENERAL RIGHT KNEE PAIN  FINDINGS:  No fracture.  No dislocation or aggressive appearing bone change.  There is mild tricompartmental arthrosis.  No joint effusion.   CONCLUSION: Mild tricompartmental arthrosis      FRED JEAN MD       Electronically Signed and Approved By: FRED JEAN MD on 7/23/2021 at 14:12                      Assessment and Plan     DX: Right knee osteoarthritis     Discussed the risks and benefits of a right knee injection. The patient expressed understanding and wished to proceed. She tolerated the injection well     Call or return if worsening symptoms.    Follow Up     Follow up in 6 weeks to recheck.       Patient was given instructions and counseling regarding her condition or for health maintenance advice. Please see specific information pulled into the AVS if appropriate.     Scribed for Valente Acuna MD by Galina Glover.  08/05/21   11:42 EDT    I have personally performed the services described in this document as scribed by the above individual and it is both accurate and complete. Valente Acuna MD 08/05/21

## 2021-09-16 RX ORDER — LORAZEPAM 0.5 MG/1
TABLET ORAL
Qty: 30 TABLET | Refills: 1 | Status: SHIPPED | OUTPATIENT
Start: 2021-09-16 | End: 2021-11-01

## 2021-09-27 ENCOUNTER — OFFICE VISIT (OUTPATIENT)
Dept: ORTHOPEDIC SURGERY | Facility: CLINIC | Age: 76
End: 2021-09-27

## 2021-09-27 VITALS — WEIGHT: 148.6 LBS | BODY MASS INDEX: 25.37 KG/M2 | HEART RATE: 67 BPM | HEIGHT: 64 IN | OXYGEN SATURATION: 96 %

## 2021-09-27 DIAGNOSIS — M17.11 PRIMARY OSTEOARTHRITIS OF RIGHT KNEE: Primary | ICD-10-CM

## 2021-09-27 PROCEDURE — 99213 OFFICE O/P EST LOW 20 MIN: CPT | Performed by: PHYSICIAN ASSISTANT

## 2021-09-27 NOTE — PROGRESS NOTES
"Chief Complaint  Follow-up of the Right Knee    Subjective          Theresa Davis is a 76 y.o. female  presents to [facility] for   History of Present Illness    Patient presents for follow-up evaluation of right knee pain, right knee osteoarthritis, she received a steroid injection from Dr. Acuna on 8/5/2021.  Patient states the injection helped she states she has decreased pain, she states she is able to walk without too much pain, patient states last week she had a fall, she states she has some pain to her thigh muscles bilaterally, denies difficulty with range of motion or ambulation, she was not seen by an ER or urgent care, she did states it is getting better.  Allergies   Allergen Reactions   • Sulfa Antibiotics Unknown - Low Severity   • Penicillins Rash and GI Intolerance        Social History     Socioeconomic History   • Marital status:      Spouse name: Not on file   • Number of children: 2   • Years of education: Not on file   • Highest education level: Not on file   Tobacco Use   • Smoking status: Never Smoker   • Smokeless tobacco: Never Used   Vaping Use   • Vaping Use: Never used   Substance and Sexual Activity   • Alcohol use: Never   • Drug use: Never        REVIEW OF SYSTEMS    Constitutional: Denies fevers, chills, weight loss  Cardiovascular: Denies chest pain, shortness of breath  Skin: Denies rashes, acute skin changes  Neurologic: Denies headache, loss of consciousness  MSK: Right knee pain      Objective   Vital Signs:   Pulse 67   Ht 162.6 cm (64\")   Wt 67.4 kg (148 lb 9.6 oz)   SpO2 96%   BMI 25.51 kg/m²     Body mass index is 25.51 kg/m².    Physical Exam    Right knee: Skin is intact, no erythema, no ecchymosis, no swelling, no signs of infection, full extension, flexion 120, stable to varus/valgus stress, stable anterior/posterior drawer, no pain with range of motion, nontender to palpation.    Procedures    Imaging Results (Most Recent)  "    None           Result Review :   The following data was reviewed by: NATA Enrique on 09/27/2021:               Assessment and Plan    Diagnoses and all orders for this visit:    1. Primary osteoarthritis of right knee (Primary)        Discussed diagnosis and treatment options with the patient she was advised we recommend continue activity as tolerated, follow-up in 6 weeks for reevaluation/possible injection.    Call or return if worsening symptoms.    Follow Up {Instructions Charge Capture  Follow-up Communications :23}  Return in about 6 weeks (around 11/8/2021) for Recheck.  Patient was given instructions and counseling regarding her condition or for health maintenance advice. Please see specific information pulled into the AVS if appropriate.

## 2021-10-26 ENCOUNTER — TELEPHONE (OUTPATIENT)
Dept: FAMILY MEDICINE CLINIC | Age: 76
End: 2021-10-26

## 2021-10-26 DIAGNOSIS — Z78.0 POSTMENOPAUSAL STATE: Primary | ICD-10-CM

## 2021-10-27 ENCOUNTER — TELEPHONE (OUTPATIENT)
Dept: FAMILY MEDICINE CLINIC | Age: 76
End: 2021-10-27

## 2021-11-05 RX ORDER — SERTRALINE HYDROCHLORIDE 100 MG/1
100 TABLET, FILM COATED ORAL DAILY
Qty: 90 TABLET | Refills: 0 | Status: SHIPPED | OUTPATIENT
Start: 2021-11-05 | End: 2022-02-11 | Stop reason: SDUPTHER

## 2021-11-05 NOTE — TELEPHONE ENCOUNTER
Caller: Theresa Davis    Relationship: Self      Medication requested (name and dosage):     Requested Prescriptions:   Requested Prescriptions     Pending Prescriptions Disp Refills   • sertraline (ZOLOFT) 100 MG tablet       Sig: Take 1 tablet by mouth Daily.        Pharmacy where request should be sent: [facility] [address] [place] [address], [place] - [address] - [phone]  - [phone] FX     Additional details provided by patient:     Best call back number: [phone]     Does the patient have less than a 3 day supply:  [x] Yes  [] No    Marizol RUDD Rep   11/05/21 10:24 EDT

## 2021-11-07 RX ORDER — LORAZEPAM 0.5 MG/1
TABLET ORAL
Qty: 30 TABLET | Refills: 0 | Status: SHIPPED | OUTPATIENT
Start: 2021-11-07 | End: 2021-12-07

## 2021-11-08 ENCOUNTER — OFFICE VISIT (OUTPATIENT)
Dept: ORTHOPEDIC SURGERY | Facility: CLINIC | Age: 76
End: 2021-11-08

## 2021-11-08 VITALS — BODY MASS INDEX: 25.78 KG/M2 | OXYGEN SATURATION: 97 % | HEIGHT: 64 IN | HEART RATE: 99 BPM | WEIGHT: 151 LBS

## 2021-11-08 DIAGNOSIS — M17.11 PRIMARY OSTEOARTHRITIS OF RIGHT KNEE: Primary | ICD-10-CM

## 2021-11-08 PROCEDURE — 20610 DRAIN/INJ JOINT/BURSA W/O US: CPT | Performed by: PHYSICIAN ASSISTANT

## 2021-11-08 RX ORDER — TRIAMCINOLONE ACETONIDE 40 MG/ML
40 INJECTION, SUSPENSION INTRA-ARTICULAR; INTRAMUSCULAR
Status: COMPLETED | OUTPATIENT
Start: 2021-11-08 | End: 2021-11-08

## 2021-11-08 RX ORDER — LIDOCAINE HYDROCHLORIDE 10 MG/ML
9 INJECTION, SOLUTION INFILTRATION; PERINEURAL
Status: COMPLETED | OUTPATIENT
Start: 2021-11-08 | End: 2021-11-08

## 2021-11-08 RX ADMIN — TRIAMCINOLONE ACETONIDE 40 MG: 40 INJECTION, SUSPENSION INTRA-ARTICULAR; INTRAMUSCULAR at 11:46

## 2021-11-08 RX ADMIN — LIDOCAINE HYDROCHLORIDE 9 ML: 10 INJECTION, SOLUTION INFILTRATION; PERINEURAL at 11:46

## 2021-11-08 NOTE — PROGRESS NOTES
"Chief Complaint  Follow-up of the Right Knee    Subjective          Theresa Davis is a 76 y.o. female  presents to [facility] for   History of Present Illness    Patient presents for follow-up evaluation right knee pain, right knee osteoarthritis her last steroid injection was on 8/5/2021.  Patient has been doing well up until recently she states pain has returned while the injection has worn off.  Patient points the anterior medial lateral knee as her areas of pain she states she has pain that goes up and down her knee she denies new injury or symptoms of pain, patient is requesting right knee steroid injection.  Allergies   Allergen Reactions   • Sulfa Antibiotics Unknown - Low Severity   • Penicillins Rash and GI Intolerance        Social History     Socioeconomic History   • Marital status:    • Number of children: 2   Tobacco Use   • Smoking status: Never Smoker   • Smokeless tobacco: Never Used   Vaping Use   • Vaping Use: Never used   Substance and Sexual Activity   • Alcohol use: Never   • Drug use: Never        REVIEW OF SYSTEMS    Constitutional: Denies fevers, chills, weight loss  Cardiovascular: Denies chest pain, shortness of breath  Skin: Denies rashes, acute skin changes  Neurologic: Denies headache, loss of consciousness  MSK: Right knee pain      Objective   Vital Signs:   Pulse 99   Ht 162.6 cm (64\")   Wt 68.5 kg (151 lb)   SpO2 97%   BMI 25.92 kg/m²     Body mass index is 25.92 kg/m².    Physical Exam    Right knee: Skin is intact, no erythema, ecchymosis, no swelling, no effusion, no signs of infection, full extension, flexion 120, stable anterior/posterior drawer, stable to varus/valgus stress.  Nontender to palpation, no pain with range of motion.    Large Joint Arthrocentesis: R knee  Date/Time: 11/8/2021 11:46 AM  Consent given by: patient  Site marked: site marked  Timeout: Immediately prior to procedure a time out was called to verify the correct " patient, procedure, equipment, support staff and site/side marked as required   Supporting Documentation  Indications: pain   Procedure Details  Location: knee - R knee  Preparation: Patient was prepped and draped in the usual sterile fashion  Needle gauge: 21 G.  Approach: lateral  Medications administered: 9 mL lidocaine 1 %; 40 mg triamcinolone acetonide 40 MG/ML  Patient tolerance: patient tolerated the procedure well with no immediate complications          Imaging Results (Most Recent)     None           Result Review :   The following data was reviewed by: NATA Enrique on 11/08/2021:               Assessment and Plan    Diagnoses and all orders for this visit:    1. Primary osteoarthritis of right knee (Primary)        Discussed diagnosis and treatment options with the patient, patient elected to have right knee steroid injection today which she tolerated well follow-up in 3 months for reevaluation, follow-up sooner if any new or concerning symptoms occur, patient agreed.    Call or return if worsening symptoms.    Follow Up   Return in about 3 months (around 2/8/2022) for Recheck.  Patient was given instructions and counseling regarding her condition or for health maintenance advice. Please see specific information pulled into the AVS if appropriate.     Addendum: 2 PM, 11/8/2021, I just got off the phone with the patient, she called our [facility] [address] office she was referred back to our office for conversation, she called concerned that she has a skin rash she states she has a rash on her abdomen that is spreading with some itching, she believes this is a allergic reaction to the injection she received today.  Patient received a right knee steroid injection which at the time she tolerated well, she had no concerning symptoms prior to departure.  Patient has tolerated the injections well in the past and has never had an allergic reaction in the past.  While talking to her on the phone I discussed  with the patient any concerning symptoms she might be experiencing, she denies chest pain, denies shortness of breath, denies dizziness, denies headache fever or chills patient denies tongue swelling, difficulty speaking.  Patient denies swelling to her knee.  Patient was advised that we recommend she go to the emergency department at [facility] for further evaluation I advised her not to go to an urgent care facility.  She was advised if she has any of these concerning symptoms we discussed including the chest pain, shortness of breath, dizziness headache fever chills tongue swelling or difficulty speaking that she should call an ambulance right away otherwise she states her  will drive her to the ER.  Patient expressed understanding, she committed to go to the emergency department.  I will call the patient tomorrow to check on her.

## 2021-11-09 ENCOUNTER — HOSPITAL ENCOUNTER (OUTPATIENT)
Dept: BONE DENSITY | Facility: HOSPITAL | Age: 76
Discharge: HOME | End: 2021-11-09
Admitting: FAMILY MEDICINE

## 2021-11-09 ENCOUNTER — DOCUMENTATION (OUTPATIENT)
Dept: ORTHOPEDIC SURGERY | Facility: CLINIC | Age: 76
End: 2021-11-09

## 2021-11-09 DIAGNOSIS — Z78.0 POSTMENOPAUSAL STATE: ICD-10-CM

## 2021-11-09 PROCEDURE — 77080 DXA BONE DENSITY AXIAL: CPT

## 2021-11-09 NOTE — PROGRESS NOTES
Patient called stating she had a rash after she received a steroid shot yesterday. She stated after 2 or 3 hours all of the rash and redness went away. She thanked us for calling and checking on her.

## 2021-11-11 ENCOUNTER — TELEPHONE (OUTPATIENT)
Dept: FAMILY MEDICINE CLINIC | Age: 76
End: 2021-11-11

## 2021-11-11 DIAGNOSIS — Z79.899 OTHER LONG TERM (CURRENT) DRUG THERAPY: ICD-10-CM

## 2021-11-11 DIAGNOSIS — E78.2 MIXED HYPERLIPIDEMIA: ICD-10-CM

## 2021-11-11 DIAGNOSIS — Z11.59 NEED FOR HEPATITIS C SCREENING TEST: ICD-10-CM

## 2021-11-11 DIAGNOSIS — N18.32 CHRONIC KIDNEY DISEASE, STAGE 3B (HCC): ICD-10-CM

## 2021-11-11 DIAGNOSIS — I10 ESSENTIAL (PRIMARY) HYPERTENSION: Primary | ICD-10-CM

## 2021-11-11 NOTE — TELEPHONE ENCOUNTER
Per [facility]. They will not do prolia til pt has an updated calcium level done. Pt has not had blood work since 10/22/20. Please advise/steven

## 2021-11-12 ENCOUNTER — TELEPHONE (OUTPATIENT)
Dept: FAMILY MEDICINE CLINIC | Age: 76
End: 2021-11-12

## 2021-11-12 NOTE — TELEPHONE ENCOUNTER
Pt informed to come in for labs prior to getting prolia injection at [facility] on 11/22/21 @ 1:00. /steven

## 2021-11-15 ENCOUNTER — LAB (OUTPATIENT)
Dept: LAB | Facility: HOSPITAL | Age: 76
End: 2021-11-15

## 2021-11-15 DIAGNOSIS — Z79.899 OTHER LONG TERM (CURRENT) DRUG THERAPY: ICD-10-CM

## 2021-11-15 DIAGNOSIS — N18.32 CHRONIC KIDNEY DISEASE, STAGE 3B (HCC): ICD-10-CM

## 2021-11-15 DIAGNOSIS — Z11.59 NEED FOR HEPATITIS C SCREENING TEST: ICD-10-CM

## 2021-11-15 DIAGNOSIS — I10 ESSENTIAL (PRIMARY) HYPERTENSION: ICD-10-CM

## 2021-11-15 DIAGNOSIS — E78.2 MIXED HYPERLIPIDEMIA: ICD-10-CM

## 2021-11-15 LAB
ALBUMIN SERPL-MCNC: 4.8 G/DL (ref 3.5–5.2)
ALBUMIN/GLOB SERPL: 1.7 G/DL
ALP SERPL-CCNC: 124 U/L (ref 39–117)
ALT SERPL W P-5'-P-CCNC: 18 U/L (ref 1–33)
ANION GAP SERPL CALCULATED.3IONS-SCNC: 6.7 MMOL/L (ref 5–15)
AST SERPL-CCNC: 22 U/L (ref 1–32)
BILIRUB SERPL-MCNC: 0.2 MG/DL (ref 0–1.2)
BUN SERPL-MCNC: 22 MG/DL (ref 8–23)
BUN/CREAT SERPL: 17.3 (ref 7–25)
CALCIUM SPEC-SCNC: 9.7 MG/DL (ref 8.6–10.5)
CHLORIDE SERPL-SCNC: 102 MMOL/L (ref 98–107)
CHOLEST SERPL-MCNC: 178 MG/DL (ref 0–200)
CO2 SERPL-SCNC: 27.3 MMOL/L (ref 22–29)
CREAT SERPL-MCNC: 1.27 MG/DL (ref 0.57–1)
DEPRECATED RDW RBC AUTO: 43.8 FL (ref 37–54)
ERYTHROCYTE [DISTWIDTH] IN BLOOD BY AUTOMATED COUNT: 12.4 % (ref 12.3–15.4)
GFR SERPL CREATININE-BSD FRML MDRD: 41 ML/MIN/1.73
GLOBULIN UR ELPH-MCNC: 2.8 GM/DL
GLUCOSE SERPL-MCNC: 71 MG/DL (ref 65–99)
HCT VFR BLD AUTO: 35.2 % (ref 34–46.6)
HCV AB SER DONR QL: NORMAL
HDLC SERPL-MCNC: 65 MG/DL (ref 40–60)
HGB BLD-MCNC: 11.9 G/DL (ref 12–15.9)
LDLC SERPL CALC-MCNC: 99 MG/DL (ref 0–100)
LDLC/HDLC SERPL: 1.5 {RATIO}
MCH RBC QN AUTO: 31.7 PG (ref 26.6–33)
MCHC RBC AUTO-ENTMCNC: 33.8 G/DL (ref 31.5–35.7)
MCV RBC AUTO: 93.9 FL (ref 79–97)
PLATELET # BLD AUTO: 385 10*3/MM3 (ref 140–450)
PMV BLD AUTO: 8.7 FL (ref 6–12)
POTASSIUM SERPL-SCNC: 4 MMOL/L (ref 3.5–5.2)
PROT SERPL-MCNC: 7.6 G/DL (ref 6–8.5)
RBC # BLD AUTO: 3.75 10*6/MM3 (ref 3.77–5.28)
SODIUM SERPL-SCNC: 136 MMOL/L (ref 136–145)
TRIGL SERPL-MCNC: 78 MG/DL (ref 0–150)
TSH SERPL DL<=0.05 MIU/L-ACNC: 2.91 UIU/ML (ref 0.27–4.2)
VLDLC SERPL-MCNC: 14 MG/DL (ref 5–40)
WBC # BLD AUTO: 6.17 10*3/MM3 (ref 3.4–10.8)

## 2021-11-15 PROCEDURE — 80061 LIPID PANEL: CPT

## 2021-11-15 PROCEDURE — 86803 HEPATITIS C AB TEST: CPT

## 2021-11-15 PROCEDURE — 85027 COMPLETE CBC AUTOMATED: CPT

## 2021-11-15 PROCEDURE — 84443 ASSAY THYROID STIM HORMONE: CPT

## 2021-11-15 PROCEDURE — 36415 COLL VENOUS BLD VENIPUNCTURE: CPT

## 2021-11-15 PROCEDURE — 80053 COMPREHEN METABOLIC PANEL: CPT

## 2021-11-19 ENCOUNTER — HOSPITAL ENCOUNTER (EMERGENCY)
Dept: HOSPITAL 49 - FER | Age: 76
Discharge: HOME | End: 2021-11-19
Payer: MEDICARE

## 2021-11-19 DIAGNOSIS — Z23: ICD-10-CM
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
## 2021-11-19 DIAGNOSIS — Z88.0: ICD-10-CM

## 2021-11-19 DIAGNOSIS — S01.111A: ICD-10-CM
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
## 2021-11-19 DIAGNOSIS — S52.531A: Primary | ICD-10-CM

## 2021-11-19 DIAGNOSIS — I10: ICD-10-CM

## 2021-11-23 ENCOUNTER — TELEPHONE (OUTPATIENT)
Dept: FAMILY MEDICINE CLINIC | Age: 76
End: 2021-11-23

## 2021-11-23 NOTE — TELEPHONE ENCOUNTER
PT CALLED TO SCHEDULE STITCH REMOVAL, SAYS SHE HAS TO HAVE THEM OUT BY TOMORROW. ADVISED WE DO NOT HAVE ANY OPENING UNTIL NEXT WEEK & THAT IF SHE WANTED TO COME IN TOMORROW AS A WALK IN WE WOULD TRY TO GET HER SQUEEZED IN BUT THAT WE CANNOT GUARANTEE THAT A PROVIDER WILL SEE HER.  SHE SAYS THAT SHE GOT THESE STITCHES AT THE HOSPITAL 4 DAYS AGO & THEY TOLD HER TO FOLLOW UP WITH HER PCP FOR REMOVAL ON DAY 5.

## 2021-11-29 ENCOUNTER — PROCEDURE VISIT (OUTPATIENT)
Dept: FAMILY MEDICINE CLINIC | Age: 76
End: 2021-11-29

## 2021-11-29 VITALS
BODY MASS INDEX: 26.29 KG/M2 | DIASTOLIC BLOOD PRESSURE: 91 MMHG | HEART RATE: 87 BPM | WEIGHT: 154 LBS | TEMPERATURE: 98.3 F | SYSTOLIC BLOOD PRESSURE: 149 MMHG | HEIGHT: 64 IN

## 2021-11-29 DIAGNOSIS — Z48.02 ENCOUNTER FOR REMOVAL OF SUTURES: Primary | ICD-10-CM

## 2021-11-29 PROCEDURE — 99212 OFFICE O/P EST SF 10 MIN: CPT | Performed by: NURSE PRACTITIONER

## 2021-11-29 NOTE — PROGRESS NOTES
Chief Complaint  Theresa Davis presents to [facility] for Suture / Staple Removal    Subjective          History of Present Illness    Theresa is here today for suture removal. She had a trip and fall and was seen at [facility] ER on 11/19/2021. Had 5 stiches placed above right eyebrow that she is here to have removed today. She is following with ortho Dr Galeano for right arm fracture. Currently in case and to follow up next month.     Review of Systems      Allergies   Allergen Reactions   • Penicillins Rash and GI Intolerance   • Sulfa Antibiotics Unknown - Low Severity      Past Medical History:   Diagnosis Date   • Anemia, unspecified    • Cervicalgia    • Essential (primary) hypertension    • Mixed hyperlipidemia    • Other long term (current) drug therapy    • Other mixed anxiety disorders      Current Outpatient Medications   Medication Sig Dispense Refill   • alendronate (FOSAMAX) 70 MG tablet Take 1 tablet by mouth 1 (One) Time Per Week.     • amitriptyline (ELAVIL) 100 MG tablet Take 1 tablet by mouth Daily.     • LORazepam (ATIVAN) 0.5 MG tablet TAKE 1/2 TO 1 TABLET BY MOUTH EVERY 12 HOURS AS NEEDED 30 tablet 0   • losartan-hydrochlorothiazide (HYZAAR) 100-25 MG per tablet Take 1 tablet by mouth Daily.     • Nutritional Supplements (QUINOA KALE & HEMP PO) Quinoa-Kale-Hemp     • sertraline (ZOLOFT) 100 MG tablet Take 1 tablet by mouth Daily. 90 tablet 0   • simvastatin (ZOCOR) 20 MG tablet Take 1 tablet by mouth Daily.       No current facility-administered medications for this visit.     Past Surgical History:   Procedure Laterality Date   • APPENDECTOMY     • CARPAL TUNNEL RELEASE     • COLONOSCOPY  06/20/2012    Normal   • HYSTERECTOMY      PARTIAL   • THORACIC OUTLET SURGERY Left       Social History     Tobacco Use   • Smoking status: Never Smoker   • Smokeless tobacco: Never Used   Vaping Use   • Vaping Use: Never used   Substance Use Topics   • Alcohol use: Never   • Drug  "use: Never     Family History   Problem Relation Age of Onset   • Breast cancer Mother    • Coronary artery disease Father      Health Maintenance Due   Topic Date Due   • TDAP/TD VACCINES (1 - Tdap) Never done   • ZOSTER VACCINE (1 of 2) Never done   • COLORECTAL CANCER SCREENING  06/26/2020   • ANNUAL WELLNESS VISIT  07/14/2021   • INFLUENZA VACCINE  08/01/2021   • COVID-19 Vaccine (3 - Booster for Pfizer series) 09/30/2021      Immunization History   Administered Date(s) Administered   • COVID-19 (PFIZER) 03/09/2021, 03/30/2021   • Fluzone High Dose =>65 Years (Vaxcare ONLY) 10/21/2012, 10/10/2013, 10/02/2017   • Influenza, Unspecified 09/23/2020   • Pneumococcal Conjugate 13-Valent (PCV13) 08/12/2015   • Pneumococcal Polysaccharide (PPSV23) 08/17/2012, 08/17/2012        Objective     Vitals:    11/29/21 1453   BP: 149/91   BP Location: Left arm   Patient Position: Sitting   Pulse: 87   Temp: 98.3 °F (36.8 °C)   TempSrc: Oral   Weight: 69.9 kg (154 lb)   Height: 162.6 cm (64\")     Body mass index is 26.43 kg/m².     Physical Exam  Vitals reviewed.   Constitutional:       General: She is not in acute distress.     Appearance: Normal appearance. She is well-developed.   HENT:      Head: Normocephalic and atraumatic.   Cardiovascular:      Rate and Rhythm: Normal rate and regular rhythm.   Pulmonary:      Effort: Pulmonary effort is normal.      Breath sounds: Normal breath sounds.   Skin:     Comments: Stitches dry and intact to right forehead   Neurological:      Mental Status: She is alert and oriented to person, place, and time.   Psychiatric:         Mood and Affect: Mood and affect normal.           Result Review :                      Suture Removal    Date/Time: 11/29/2021 3:48 PM  Performed by: Lauren Zuleta APRN  Authorized by: Lauren Zuleta APRN   Body area: head/neck  Location details: forehead  Wound Appearance: clean and pink  Sutures Removed: 5  Post-removal: antibiotic ointment applied  Patient " tolerance: patient tolerated the procedure well with no immediate complications              Assessment and Plan      Diagnoses and all orders for this visit:    1. Encounter for removal of sutures (Primary)  Comments:  Keep area clean. Monitor for s/s worsening and follow up as needed. Keep scheduled follow up with Dr Galeano in regards to arm fracture.     Other orders  -     Suture Removal              Follow Up     Return for As needed for persistent or worsening symptoms.

## 2021-11-30 NOTE — TELEPHONE ENCOUNTER
Pt 'no showed' for her [facility] appt at [facility]. Rescheduled for 12/7/21 @ 2:45. Pt informed/steven

## 2021-12-07 RX ORDER — LORAZEPAM 0.5 MG/1
TABLET ORAL
Qty: 30 TABLET | Refills: 0 | Status: SHIPPED | OUTPATIENT
Start: 2021-12-07 | End: 2022-01-19

## 2021-12-07 RX ORDER — ALENDRONATE SODIUM 70 MG/1
TABLET ORAL
Qty: 12 TABLET | Refills: 0 | Status: SHIPPED | OUTPATIENT
Start: 2021-12-07 | End: 2022-02-11

## 2021-12-07 NOTE — TELEPHONE ENCOUNTER
I did send a refill on both of these medications.  However, if we do start her on Prolia, then I would recommend she stop alendronate at that time.  Also, go ahead and schedule follow-up in about a month for recheck on the lorazepam.  Thanks.

## 2022-01-12 ENCOUNTER — CLINICAL SUPPORT (OUTPATIENT)
Dept: FAMILY MEDICINE CLINIC | Age: 77
End: 2022-01-12

## 2022-01-12 DIAGNOSIS — Z23 NEED FOR INFLUENZA VACCINATION: Primary | ICD-10-CM

## 2022-01-12 PROCEDURE — G0008 ADMIN INFLUENZA VIRUS VAC: HCPCS | Performed by: FAMILY MEDICINE

## 2022-01-12 PROCEDURE — 90662 IIV NO PRSV INCREASED AG IM: CPT | Performed by: FAMILY MEDICINE

## 2022-01-19 RX ORDER — LORAZEPAM 0.5 MG/1
.25-.5 TABLET ORAL 2 TIMES DAILY PRN
Qty: 30 TABLET | Refills: 0 | Status: SHIPPED | OUTPATIENT
Start: 2022-01-19 | End: 2022-02-11 | Stop reason: SDUPTHER

## 2022-02-07 ENCOUNTER — OFFICE VISIT (OUTPATIENT)
Dept: ORTHOPEDIC SURGERY | Facility: CLINIC | Age: 77
End: 2022-02-07

## 2022-02-07 VITALS — BODY MASS INDEX: 26.7 KG/M2 | HEIGHT: 64 IN | HEART RATE: 94 BPM | WEIGHT: 156.4 LBS | OXYGEN SATURATION: 98 %

## 2022-02-07 DIAGNOSIS — M17.11 PRIMARY OSTEOARTHRITIS OF RIGHT KNEE: Primary | ICD-10-CM

## 2022-02-07 DIAGNOSIS — M25.561 RIGHT KNEE PAIN, UNSPECIFIED CHRONICITY: ICD-10-CM

## 2022-02-07 PROCEDURE — 20610 DRAIN/INJ JOINT/BURSA W/O US: CPT | Performed by: PHYSICIAN ASSISTANT

## 2022-02-07 RX ORDER — BUPIVACAINE HYDROCHLORIDE 2.5 MG/ML
5 INJECTION, SOLUTION INFILTRATION; PERINEURAL
Status: COMPLETED | OUTPATIENT
Start: 2022-02-07 | End: 2022-02-07

## 2022-02-07 RX ORDER — TRIAMCINOLONE ACETONIDE 40 MG/ML
40 INJECTION, SUSPENSION INTRA-ARTICULAR; INTRAMUSCULAR
Status: COMPLETED | OUTPATIENT
Start: 2022-02-07 | End: 2022-02-07

## 2022-02-07 RX ADMIN — BUPIVACAINE HYDROCHLORIDE 5 ML: 2.5 INJECTION, SOLUTION INFILTRATION; PERINEURAL at 11:28

## 2022-02-07 RX ADMIN — TRIAMCINOLONE ACETONIDE 40 MG: 40 INJECTION, SUSPENSION INTRA-ARTICULAR; INTRAMUSCULAR at 11:28

## 2022-02-07 NOTE — PROGRESS NOTES
Chief Complaint  Pain and Follow-up of the Right Knee    Subjective          Theresa Davis is a 76 y.o. female  presents to [facility] for   History of Present Illness    Patient presents for follow-up evaluation of right knee pain, right knee osteoarthritis, she was last seen on 11/8/2021 and received a right knee steroid injection.  Shortly after her visit that day the patient called our office stating that she had a rash to her abdomen which was causing some itching.  Patient was advised to go to the ER, she called back later that day stating that she had resolving of the rash and wound up not needing to go to the ER.  We followed up with her and she was stating that she continued to do well.  Patient presents today complaining of right knee pain she points to the medial lateral anterior knee as her areas of pain she admits to worse pain going up and down stairs.  Patient denies new injury or symptoms of pain, she states pain is bad enough that she would like to have a right knee steroid injection today, she and her spouse were in the room, we discussed the risks, benefits of the steroid injection, patient spouse and patient agreed that she would like the injection and they will accept the risks of possible allergic reaction again.  They were advised that if the rash begins again that they should go immediately to the ER and they agreed to this plan.  Allergies   Allergen Reactions   • Penicillins Rash and GI Intolerance   • Sulfa Antibiotics Unknown - Low Severity        Social History     Socioeconomic History   • Marital status:    • Number of children: 2   Tobacco Use   • Smoking status: Never Smoker   • Smokeless tobacco: Never Used   Vaping Use   • Vaping Use: Never used   Substance and Sexual Activity   • Alcohol use: Never   • Drug use: Never        REVIEW OF SYSTEMS    Constitutional: Denies fevers, chills, weight loss  Cardiovascular: Denies chest pain, shortness of  "breath  Skin: Denies rashes, acute skin changes  Neurologic: Denies headache, loss of consciousness  MSK: Right knee pain      Objective   Vital Signs:   Pulse 94   Ht 162.6 cm (64\")   Wt 70.9 kg (156 lb 6.4 oz)   SpO2 98%   BMI 26.85 kg/m²     Body mass index is 26.85 kg/m².    Physical Exam    Right knee: Skin is intact, no erythema, ecchymosis, no swelling, no effusion, no signs of infection, full extension, flexion 120, stable anterior/posterior drawer, stable to varus/valgus stress.  Nontender to palpation, no pain with range of motion.    Large Joint Arthrocentesis: R knee  Date/Time: 2/7/2022 11:28 AM  Consent given by: patient  Site marked: site marked  Timeout: Immediately prior to procedure a time out was called to verify the correct patient, procedure, equipment, support staff and site/side marked as required   Supporting Documentation  Indications: pain   Procedure Details  Location: knee - R knee  Preparation: Patient was prepped and draped in the usual sterile fashion  Needle gauge: 21 G.  Approach: lateral  Medications administered: 5 mL bupivacaine 0.25 %; 40 mg triamcinolone acetonide 40 MG/ML  Patient tolerance: patient tolerated the procedure well with no immediate complications          Imaging Results (Most Recent)     None           Result Review :   The following data was reviewed by: Maria D Benítez on 02/07/2022:               Assessment and Plan    Diagnoses and all orders for this visit:    1. Primary osteoarthritis of right knee (Primary)    2. Right knee pain, unspecified chronicity        Discussed diagnosis and treatment options with the patient and her spouse, patient and spouse were advised of the risk, benefits of the steroid injection, specifically we discussed the possibility of another skin rash/allergic reaction occurring, patient and spouse agreed that they were willing to accept this risk, they were advised if the rash returns or any allergic reaction symptoms occur that " they would go to the ER, we specifically discussed swelling of the tongue, trouble breathing, trouble swallowing, chest pain that they should go immediately to the ER, they agreed.  Patient tolerated the injection well, follow-up in 3 months for reevaluation.    Call or return if worsening symptoms.    Follow Up   Return in about 3 months (around 5/7/2022).  Patient was given instructions and counseling regarding her condition or for health maintenance advice. Please see specific information pulled into the AVS if appropriate.

## 2022-02-11 ENCOUNTER — OFFICE VISIT (OUTPATIENT)
Dept: FAMILY MEDICINE CLINIC | Age: 77
End: 2022-02-11

## 2022-02-11 VITALS
TEMPERATURE: 97.4 F | WEIGHT: 153.6 LBS | HEART RATE: 82 BPM | HEIGHT: 64 IN | BODY MASS INDEX: 26.22 KG/M2 | SYSTOLIC BLOOD PRESSURE: 120 MMHG | DIASTOLIC BLOOD PRESSURE: 83 MMHG

## 2022-02-11 DIAGNOSIS — Z79.899 OTHER LONG TERM (CURRENT) DRUG THERAPY: ICD-10-CM

## 2022-02-11 DIAGNOSIS — I10 ESSENTIAL (PRIMARY) HYPERTENSION: ICD-10-CM

## 2022-02-11 DIAGNOSIS — E78.2 MIXED HYPERLIPIDEMIA: ICD-10-CM

## 2022-02-11 DIAGNOSIS — F41.3 OTHER MIXED ANXIETY DISORDERS: Primary | ICD-10-CM

## 2022-02-11 PROCEDURE — 99214 OFFICE O/P EST MOD 30 MIN: CPT | Performed by: FAMILY MEDICINE

## 2022-02-11 RX ORDER — SIMVASTATIN 20 MG
20 TABLET ORAL NIGHTLY
Qty: 90 TABLET | Refills: 3 | Status: SHIPPED | OUTPATIENT
Start: 2022-02-11 | End: 2022-09-16 | Stop reason: SDUPTHER

## 2022-02-11 RX ORDER — SERTRALINE HYDROCHLORIDE 100 MG/1
100 TABLET, FILM COATED ORAL DAILY
Qty: 90 TABLET | Refills: 3 | Status: SHIPPED | OUTPATIENT
Start: 2022-02-11 | End: 2022-09-16 | Stop reason: SDUPTHER

## 2022-02-11 RX ORDER — AMITRIPTYLINE HYDROCHLORIDE 100 MG/1
100 TABLET, FILM COATED ORAL NIGHTLY
Qty: 90 TABLET | Refills: 3 | Status: SHIPPED | OUTPATIENT
Start: 2022-02-11 | End: 2022-06-30

## 2022-02-11 RX ORDER — LORAZEPAM 0.5 MG/1
.25-.5 TABLET ORAL 2 TIMES DAILY PRN
Qty: 40 TABLET | Refills: 2 | Status: SHIPPED | OUTPATIENT
Start: 2022-02-11 | End: 2022-06-16

## 2022-02-11 RX ORDER — LOSARTAN POTASSIUM AND HYDROCHLOROTHIAZIDE 25; 100 MG/1; MG/1
1 TABLET ORAL DAILY
Qty: 90 TABLET | Refills: 3 | Status: SHIPPED | OUTPATIENT
Start: 2022-02-11 | End: 2022-06-30

## 2022-02-11 NOTE — PATIENT INSTRUCTIONS

## 2022-02-11 NOTE — PROGRESS NOTES
Cuauhtemoc Davis presents to [facility].    Chief Complaint:  Follow up on anxiety, blood pressure, cholesterol    Subjective       History of Present Illness:  Cuauhtemoc is being seen today for follow up on anxiety.  This is a chronic issue for her for which she takes lorazepam currently.  She continues to have significant stressors related to her family and does not feel that we could make any change at this time.  She has noted some increased anxiety since her last visit and asks if we might be able to increase medication somewhat.  She also remains on sertraline and tolerates it.  Risks are discussed with her again today.  Issac is okay.      She also has history of hypertesnion and elevated cholesterol for which she is on medical therapy.      Review of Systems:  Review of Systems   Constitutional: Negative for chills and fever.   Respiratory: Negative for cough and shortness of breath.    Cardiovascular: Negative for chest pain and palpitations.   Gastrointestinal: Negative for abdominal pain, nausea and vomiting.        Objective   Medical History:  Past Medical History:   • Anemia, unspecified   • Cervicalgia   • Essential (primary) hypertension   • Mixed hyperlipidemia   • Other long term (current) drug therapy   • Other mixed anxiety disorders     Past Surgical History:   • APPENDECTOMY   • CARPAL TUNNEL RELEASE   • COLONOSCOPY    Normal   • HYSTERECTOMY    PARTIAL   • THORACIC OUTLET SURGERY      Family History   Problem Relation Age of Onset   • Breast cancer Mother    • Coronary artery disease Father      Social History     Tobacco Use   • Smoking status: Never Smoker   • Smokeless tobacco: Never Used   Substance Use Topics   • Alcohol use: Never       Health Maintenance Due   Topic Date Due   • TDAP/TD VACCINES (1 - Tdap) Never done   • ZOSTER VACCINE (1 of 2) Never done   • COLORECTAL CANCER SCREENING  06/26/2020   • ANNUAL WELLNESS VISIT  07/14/2021   • COVID-19 Vaccine (3 -  "Booster for Pfizer series) 08/30/2021        Immunization History   Administered Date(s) Administered   • COVID-19 (PFIZER) PURPLE CAP 03/09/2021, 03/30/2021   • Fluzone High Dose =>65 Years (Vaxcare ONLY) 10/21/2012, 10/10/2013, 10/02/2017   • Fluzone High-Dose 65+yrs 01/12/2022   • Influenza, Unspecified 09/23/2020   • Pneumococcal Conjugate 13-Valent (PCV13) 08/12/2015   • Pneumococcal Polysaccharide (PPSV23) 08/17/2012, 08/17/2012       Allergies   Allergen Reactions   • Penicillins Rash and GI Intolerance   • Sulfa Antibiotics Unknown - Low Severity        Medications:  Current Outpatient Medications on File Prior to Visit   Medication Sig   • denosumab (PROLIA) 60 MG/ML solution prefilled syringe syringe Inject 60 mg under the skin into the appropriate area as directed Every 6 (Six) Months.   • Nutritional Supplements (QUINOA KALE & HEMP PO) Quinoa-Kale-Hemp   • [DISCONTINUED] alendronate (FOSAMAX) 70 MG tablet TAKE 1 TABLET BY MOUTH ONCE WEEKLY ON AN EMPTY STOMACH BEFORE BREAKFAST. REMAIN UPRIGHT FOR 30 MINUTES & TAKE WITH 8 OUNCES OF WATER   • [DISCONTINUED] amitriptyline (ELAVIL) 100 MG tablet Take 1 tablet by mouth Daily.   • [DISCONTINUED] LORazepam (ATIVAN) 0.5 MG tablet Take 0.5-1 tablets by mouth 2 (Two) Times a Day As Needed for Anxiety.   • [DISCONTINUED] losartan-hydrochlorothiazide (HYZAAR) 100-25 MG per tablet Take 1 tablet by mouth Daily.   • [DISCONTINUED] sertraline (ZOLOFT) 100 MG tablet Take 1 tablet by mouth Daily.   • [DISCONTINUED] simvastatin (ZOCOR) 20 MG tablet Take 1 tablet by mouth Daily.     No current facility-administered medications on file prior to visit.       Vital Signs:   /83 (BP Location: Right arm, Patient Position: Sitting)   Pulse 82   Temp 97.4 °F (36.3 °C) (Oral)   Ht 162.6 cm (64.02\")   Wt 69.7 kg (153 lb 9.6 oz)   BMI 26.35 kg/m²       Physical Exam:  Physical Exam  Vitals reviewed.   Constitutional:       General: She is not in acute distress.     " Appearance: She is not ill-appearing.   Eyes:      Pupils: Pupils are equal, round, and reactive to light.   Neck:      Comments: No thyromegaly  Cardiovascular:      Rate and Rhythm: Normal rate and regular rhythm.   Pulmonary:      Effort: Pulmonary effort is normal.      Breath sounds: Normal breath sounds.   Abdominal:      General: There is no distension.      Palpations: Abdomen is soft.      Tenderness: There is no abdominal tenderness.   Musculoskeletal:      Cervical back: Neck supple.   Lymphadenopathy:      Cervical: No cervical adenopathy.   Skin:     Findings: No lesion or rash.   Neurological:      General: No focal deficit present.      Mental Status: She is alert.   Psychiatric:         Mood and Affect: Mood normal.         Behavior: Behavior normal.         Result Review      The following data was reviewed by Waylon Tang MD on 02/11/2022.  Lab Results   Component Value Date    WBC 6.17 11/15/2021    HGB 11.9 (L) 11/15/2021    HCT 35.2 11/15/2021    MCV 93.9 11/15/2021     11/15/2021     Lab Results   Component Value Date    GLUCOSE 71 11/15/2021    BUN 22 11/15/2021    CREATININE 1.27 (H) 11/15/2021     11/15/2021    K 4.0 11/15/2021     11/15/2021    CO2 27.3 11/15/2021    CALCIUM 9.7 11/15/2021    PROTEINTOT 7.6 11/15/2021    ALBUMIN 4.80 11/15/2021    ALT 18 11/15/2021    AST 22 11/15/2021    ALKPHOS 124 (H) 11/15/2021    BILITOT 0.2 11/15/2021    EGFRIFNONA 41 (L) 11/15/2021    GLOB 2.8 11/15/2021    AGRATIO 1.7 11/15/2021    BCR 17.3 11/15/2021    ANIONGAP 6.7 11/15/2021      Lab Results   Component Value Date    CHOL 178 11/15/2021    CHLPL 190 10/22/2020    TRIG 78 11/15/2021    HDL 65 (H) 11/15/2021    LDL 99 11/15/2021     Lab Results   Component Value Date    TSH 2.910 11/15/2021     No results found for: HGBA1C           Assessment and Plan:   Today, we have reviewed Theresa's care.  She seems to be doing well overall, but her anxiety level has been increased  somewhat recently.  We will give her a slightly higher quantity of medication, but I have encouraged her to be cautious given the potential risks of use of lorazepam.  Otherwise, we will update her usual medications for another year.  We will plan to see her back in about 6 months for recheck and Medicare wellness exam.  No other short-term changes anticipated.       Diagnoses and all orders for this visit:    1. Other mixed anxiety disorders (Primary)  -     LORazepam (ATIVAN) 0.5 MG tablet; Take 0.5-1 tablets by mouth 2 (Two) Times a Day As Needed for Anxiety.  Dispense: 40 tablet; Refill: 2  -     sertraline (ZOLOFT) 100 MG tablet; Take 1 tablet by mouth Daily.  Dispense: 90 tablet; Refill: 3  -     amitriptyline (ELAVIL) 100 MG tablet; Take 1 tablet by mouth Every Night.  Dispense: 90 tablet; Refill: 3    2. Other long term (current) drug therapy  -     LORazepam (ATIVAN) 0.5 MG tablet; Take 0.5-1 tablets by mouth 2 (Two) Times a Day As Needed for Anxiety.  Dispense: 40 tablet; Refill: 2    3. Mixed hyperlipidemia  -     simvastatin (ZOCOR) 20 MG tablet; Take 1 tablet by mouth Every Night.  Dispense: 90 tablet; Refill: 3    4. Essential (primary) hypertension  -     losartan-hydrochlorothiazide (HYZAAR) 100-25 MG per tablet; Take 1 tablet by mouth Daily.  Dispense: 90 tablet; Refill: 3          Follow Up   Return in about 6 months (around 8/11/2022) for Recheck, Medicare Wellness.  Patient was given instructions and counseling regarding her condition or for health maintenance advice. Please see specific information pulled into the AVS if appropriate.

## 2022-03-28 ENCOUNTER — TELEPHONE (OUTPATIENT)
Dept: FAMILY MEDICINE CLINIC | Age: 77
End: 2022-03-28

## 2022-03-28 DIAGNOSIS — Z12.31 OTHER SCREENING MAMMOGRAM: Primary | ICD-10-CM

## 2022-05-09 ENCOUNTER — TELEPHONE (OUTPATIENT)
Dept: FAMILY MEDICINE CLINIC | Age: 77
End: 2022-05-09

## 2022-05-09 DIAGNOSIS — M81.0 AGE-RELATED OSTEOPOROSIS WITHOUT CURRENT PATHOLOGICAL FRACTURE: Primary | ICD-10-CM

## 2022-05-09 NOTE — TELEPHONE ENCOUNTER
Pt due for Prolia 6/28/22    Last Dexa -2.5  DEXA Bone Density Axial (11/09/2021 10:55)    Last CMP- Calcium 9.7  Comprehensive Metabolic Panel (11/15/2021 08:05)    Last office Visit   Office Visit with Waylon Tang MD (02/11/2022)

## 2022-05-24 ENCOUNTER — TELEPHONE (OUTPATIENT)
Dept: FAMILY MEDICINE CLINIC | Age: 77
End: 2022-05-24

## 2022-05-24 NOTE — TELEPHONE ENCOUNTER
I would recommend delaying mammogram for 2 months.  Please TICKLE for it to be done then.  Thanks.

## 2022-05-24 NOTE — TELEPHONE ENCOUNTER
Upon review of care everywhere option, pt fell and fractured her femur, and had surgery 5/23/22, ok to hold off on mammo?

## 2022-05-24 NOTE — TELEPHONE ENCOUNTER
Spoke to pt regarding mammogram appointment pt missed yesterday, pt is currently inpatient and will contact us when she goes home.

## 2022-06-13 ENCOUNTER — TELEPHONE (OUTPATIENT)
Dept: FAMILY MEDICINE CLINIC | Age: 77
End: 2022-06-13

## 2022-06-13 NOTE — TELEPHONE ENCOUNTER
Pt states she can't come in for Prolia. She broke her leg and was told by doctor that she won't be driving for about 6 months. Please advise.

## 2022-06-13 NOTE — TELEPHONE ENCOUNTER
Caller: Theresa Davis    Relationship: Self    Best call back number: [phone]    What is the best time to reach you: ANY    Who are you requesting to speak with (clinical staff, provider,  specific staff member): BECKY    What was the call regarding: PATIENT CALLING ABOUT  PROLIA - PILL FORM -     Do you require a callback: YES

## 2022-06-15 ENCOUNTER — TELEPHONE (OUTPATIENT)
Dept: FAMILY MEDICINE CLINIC | Age: 77
End: 2022-06-15

## 2022-06-15 NOTE — TELEPHONE ENCOUNTER
Caller: [facility]     Relationship: PHYSICAL THERAPIST     Best call back number: [phone]    What orders are you requesting (i.e. lab or imaging): VERBAL PT ORDERS     In what timeframe would the patient need to come in: ASAP     Where will you receive your lab/imaging services: [facility]- IN HOME     Additional notes: [facility] PT, IS REQUESTING FOR VERBAL ORDERS FOR PT, WITH DURATION OF TWICE A WEEK FOR THREE WEEKS, AND ONCE A WEEK FOR SIX WEEKS.

## 2022-06-16 DIAGNOSIS — F41.3 OTHER MIXED ANXIETY DISORDERS: ICD-10-CM

## 2022-06-16 DIAGNOSIS — Z79.899 OTHER LONG TERM (CURRENT) DRUG THERAPY: ICD-10-CM

## 2022-06-16 RX ORDER — LORAZEPAM 0.5 MG/1
0.25-0.5 TABLET ORAL 2 TIMES DAILY PRN
Qty: 40 TABLET | Refills: 1 | Status: SHIPPED | OUTPATIENT
Start: 2022-06-16 | End: 2022-08-31 | Stop reason: SDUPTHER

## 2022-06-21 ENCOUNTER — TELEPHONE (OUTPATIENT)
Dept: FAMILY MEDICINE CLINIC | Age: 77
End: 2022-06-21

## 2022-06-21 NOTE — TELEPHONE ENCOUNTER
Caller: RYLEY VAZQUEZ    Relationship: Emergency Contact    Best call back number: [phone]    What is the medical concern/diagnosis:RYLEY STATED THAT PATIENT HAS HAD RECENT FALL AND IN THE AFTERNOONS AND EVENINGS TALKING ABOUT THINGS THAT MAKE NO SENSE, RECENT FRACTURE IN BOTH LEGS    What specialty or service is being requested: NEUROLOGY    What is the provider, practice or medical service name: IN OR CLOSE TO [place] BUT NOT [place]

## 2022-06-21 NOTE — TELEPHONE ENCOUNTER
Caller: DONITA [facility]     Relationship: NURSE SUPERVISOR     Best call back number: [phone]    What form or medical record are you requesting: MOST RECENT AND A COUPLE OF PAST URINE CULTURE REPORTS     Who is requesting this form or medical record from you: DONITA FROM [facility]     How would you like to receive the form or medical records (pick-up, mail, fax): FAX    ATTN:DONITA  FAX NUMBER [phone]    Timeframe paperwork needed: ASAP PATIENT IS IN THE HOSPITAL TODAY AND IS REQUESTING IT TODAY 06/21/2022 IF AT POSSIBLE

## 2022-06-30 ENCOUNTER — LAB (OUTPATIENT)
Dept: LAB | Facility: HOSPITAL | Age: 77
End: 2022-06-30

## 2022-06-30 ENCOUNTER — OFFICE VISIT (OUTPATIENT)
Dept: FAMILY MEDICINE CLINIC | Age: 77
End: 2022-06-30

## 2022-06-30 VITALS
HEIGHT: 64 IN | BODY MASS INDEX: 25 KG/M2 | TEMPERATURE: 98.6 F | DIASTOLIC BLOOD PRESSURE: 89 MMHG | SYSTOLIC BLOOD PRESSURE: 146 MMHG | HEART RATE: 77 BPM | WEIGHT: 146.4 LBS

## 2022-06-30 DIAGNOSIS — I10 ESSENTIAL (PRIMARY) HYPERTENSION: ICD-10-CM

## 2022-06-30 DIAGNOSIS — E78.2 MIXED HYPERLIPIDEMIA: ICD-10-CM

## 2022-06-30 DIAGNOSIS — N39.0 URINARY TRACT INFECTION WITHOUT HEMATURIA, SITE UNSPECIFIED: ICD-10-CM

## 2022-06-30 DIAGNOSIS — F41.3 OTHER MIXED ANXIETY DISORDERS: ICD-10-CM

## 2022-06-30 DIAGNOSIS — R55 SYNCOPE, UNSPECIFIED SYNCOPE TYPE: Primary | ICD-10-CM

## 2022-06-30 DIAGNOSIS — D64.9 ANEMIA, UNSPECIFIED TYPE: ICD-10-CM

## 2022-06-30 DIAGNOSIS — E87.1 HYPONATREMIA: ICD-10-CM

## 2022-06-30 DIAGNOSIS — Z79.899 OTHER LONG TERM (CURRENT) DRUG THERAPY: ICD-10-CM

## 2022-06-30 DIAGNOSIS — R55 SYNCOPE, UNSPECIFIED SYNCOPE TYPE: ICD-10-CM

## 2022-06-30 DIAGNOSIS — E53.8 FOLIC ACID DEFICIENCY: ICD-10-CM

## 2022-06-30 LAB
BACTERIA UR QL AUTO: ABNORMAL /HPF
BILIRUB UR QL STRIP: NEGATIVE
CLARITY UR: CLEAR
COLOR UR: YELLOW
DEPRECATED RDW RBC AUTO: 41.4 FL (ref 37–54)
ERYTHROCYTE [DISTWIDTH] IN BLOOD BY AUTOMATED COUNT: 12.1 % (ref 12.3–15.4)
GLUCOSE UR STRIP-MCNC: NEGATIVE MG/DL
HCT VFR BLD AUTO: 33.6 % (ref 34–46.6)
HGB BLD-MCNC: 10.8 G/DL (ref 12–15.9)
HGB UR QL STRIP.AUTO: NEGATIVE
KETONES UR QL STRIP: NEGATIVE
LEUKOCYTE ESTERASE UR QL STRIP.AUTO: NEGATIVE
MCH RBC QN AUTO: 29.6 PG (ref 26.6–33)
MCHC RBC AUTO-ENTMCNC: 32.1 G/DL (ref 31.5–35.7)
MCV RBC AUTO: 92.1 FL (ref 79–97)
MUCOUS THREADS URNS QL MICRO: ABNORMAL /HPF
NITRITE UR QL STRIP: NEGATIVE
PH UR STRIP.AUTO: 6.5 [PH] (ref 5–8)
PLATELET # BLD AUTO: 401 10*3/MM3 (ref 140–450)
PMV BLD AUTO: 8.3 FL (ref 6–12)
PROT UR QL STRIP: NEGATIVE
RBC # BLD AUTO: 3.65 10*6/MM3 (ref 3.77–5.28)
RBC # UR STRIP: ABNORMAL /HPF
REF LAB TEST METHOD: ABNORMAL
SP GR UR STRIP: >=1.03 (ref 1–1.03)
SQUAMOUS #/AREA URNS HPF: ABNORMAL /HPF
STARCH GRANULES URNS QL MICRO: ABNORMAL /HPF
UROBILINOGEN UR QL STRIP: NORMAL
WBC # UR STRIP: ABNORMAL /HPF
WBC NRBC COR # BLD: 6.03 10*3/MM3 (ref 3.4–10.8)

## 2022-06-30 PROCEDURE — 99214 OFFICE O/P EST MOD 30 MIN: CPT | Performed by: FAMILY MEDICINE

## 2022-06-30 PROCEDURE — 82746 ASSAY OF FOLIC ACID SERUM: CPT

## 2022-06-30 PROCEDURE — 84466 ASSAY OF TRANSFERRIN: CPT

## 2022-06-30 PROCEDURE — 82607 VITAMIN B-12: CPT

## 2022-06-30 PROCEDURE — 82728 ASSAY OF FERRITIN: CPT

## 2022-06-30 PROCEDURE — 84443 ASSAY THYROID STIM HORMONE: CPT

## 2022-06-30 PROCEDURE — 80053 COMPREHEN METABOLIC PANEL: CPT

## 2022-06-30 PROCEDURE — 85027 COMPLETE CBC AUTOMATED: CPT

## 2022-06-30 PROCEDURE — 83540 ASSAY OF IRON: CPT

## 2022-06-30 PROCEDURE — 81001 URINALYSIS AUTO W/SCOPE: CPT

## 2022-06-30 PROCEDURE — 36415 COLL VENOUS BLD VENIPUNCTURE: CPT

## 2022-06-30 RX ORDER — LOSARTAN POTASSIUM 25 MG/1
1 TABLET ORAL DAILY
COMMUNITY
Start: 2022-06-22 | End: 2022-07-01 | Stop reason: SDUPTHER

## 2022-06-30 RX ORDER — HYDROXYZINE HYDROCHLORIDE 25 MG/1
25 TABLET, FILM COATED ORAL EVERY 6 HOURS PRN
COMMUNITY
End: 2022-06-30

## 2022-06-30 NOTE — PROGRESS NOTES
Theresa Davis presents to [facility].    Chief Complaint:  Follow up on syncope, hyponatremia, anemia, anxiety    Subjective       History of Present Illness:  Theresa is in today for follow up after her recent hospital stay.  She was cared in the hospital for a couple of nights after developing acute mental status changes.  She was felt to possibly have some issue related to hyponatremia.  When she was discharged from the hospital, the hospitalist service stopped the losartan with hydrochlorothiazide as a result of this.  In reviewing her medications, she is also on several psychoactive medications presenting with bottles of amitriptyline, sertraline, lorazepam, and hydroxyzine.  It is unclear whether these medications may have contributed as well.  As noted, her medications do include lorazepam which she has been taking for some time.  It is unclear whether it may be contributing to some memory issues or possibly increased fall risk.      Review of Systems:  Review of Systems   Constitutional: Negative for chills and fever.   Respiratory: Negative for cough and shortness of breath.    Cardiovascular: Negative for chest pain and palpitations.   Gastrointestinal: Negative for abdominal pain, nausea and vomiting.        Objective   Medical History:  Past Medical History:   • Anemia, unspecified   • Cervicalgia   • Essential (primary) hypertension   • Mixed hyperlipidemia   • Other long term (current) drug therapy   • Other mixed anxiety disorders     Past Surgical History:   • APPENDECTOMY   • CARPAL TUNNEL RELEASE   • COLONOSCOPY    Normal   • HYSTERECTOMY    PARTIAL   • THORACIC OUTLET SURGERY      Family History   Problem Relation Age of Onset   • Breast cancer Mother    • Coronary artery disease Father      Social History     Tobacco Use   • Smoking status: Never Smoker   • Smokeless tobacco: Never Used   Substance Use Topics   • Alcohol use: Never       Health Maintenance Due   Topic  "Date Due   • TDAP/TD VACCINES (1 - Tdap) Never done   • ZOSTER VACCINE (1 of 2) Never done   • ANNUAL WELLNESS VISIT  07/14/2021   • COLORECTAL CANCER SCREENING  06/20/2022        Immunization History   Administered Date(s) Administered   • COVID-19 (PFIZER) PURPLE CAP 03/09/2021, 03/30/2021, 12/22/2021, 12/23/2021   • Fluzone High Dose =>65 Years ([employer] ONLY) 10/21/2012, 10/10/2013, 10/02/2017   • Fluzone High-Dose 65+yrs 01/12/2022   • Influenza, Unspecified 09/23/2020   • Pneumococcal Conjugate 13-Valent (PCV13) 08/12/2015   • Pneumococcal Polysaccharide (PPSV23) 08/17/2012, 08/17/2012       Allergies   Allergen Reactions   • Penicillins Rash and GI Intolerance   • Sulfa Antibiotics Unknown - Low Severity        Medications:  Current Outpatient Medications on File Prior to Visit   Medication Sig   • denosumab (PROLIA) 60 MG/ML solution prefilled syringe syringe Inject 1 mL under the skin into the appropriate area as directed Every 6 (Six) Months. T score -2.5, Dx M81.0   • LORazepam (ATIVAN) 0.5 MG tablet Take 0.5-1 tablets by mouth 2 (Two) Times a Day As Needed for Anxiety.   • losartan (COZAAR) 25 MG tablet Take 1 tablet by mouth Daily.   • Nutritional Supplements (QUINOA KALE & HEMP PO) Quinoa-Kale-Hemp   • sertraline (ZOLOFT) 100 MG tablet Take 1 tablet by mouth Daily.   • simvastatin (ZOCOR) 20 MG tablet Take 1 tablet by mouth Every Night.   • [DISCONTINUED] amitriptyline (ELAVIL) 100 MG tablet Take 1 tablet by mouth Every Night.   • [DISCONTINUED] hydrOXYzine (ATARAX) 25 MG tablet Take 25 mg by mouth Every 6 (Six) Hours As Needed for Itching.   • [DISCONTINUED] losartan-hydrochlorothiazide (HYZAAR) 100-25 MG per tablet Take 1 tablet by mouth Daily.     No current facility-administered medications on file prior to visit.       Vital Signs:   /90 (BP Location: Left arm, Patient Position: Sitting)   Pulse 73   Temp 98.6 °F (37 °C) (Oral)   Ht 162.6 cm (64.02\")   Wt 66.4 kg (146 lb 6.4 oz)   BMI " 25.12 kg/m²       Physical Exam:  Physical Exam  Vitals reviewed.   Constitutional:       General: She is not in acute distress.     Appearance: She is not ill-appearing.   Eyes:      Pupils: Pupils are equal, round, and reactive to light.   Neck:      Comments: No thyromegaly  Cardiovascular:      Rate and Rhythm: Normal rate and regular rhythm.   Pulmonary:      Effort: Pulmonary effort is normal.      Breath sounds: Normal breath sounds.   Abdominal:      General: There is no distension.      Palpations: Abdomen is soft.      Tenderness: There is no abdominal tenderness.   Musculoskeletal:      Cervical back: Neck supple.   Lymphadenopathy:      Cervical: No cervical adenopathy.   Skin:     Findings: No lesion or rash.   Neurological:      General: No focal deficit present.      Mental Status: She is alert and oriented to person, place, and time. Mental status is at baseline.      Cranial Nerves: No cranial nerve deficit.         Result Review      The following data was reviewed by Waylon Tang MD on 06/30/2022.  Lab Results   Component Value Date    WBC 5.89 06/06/2022    HGB 8.2 (L) 06/06/2022    HCT 24.1 (L) 06/06/2022    MCV 92.3 06/06/2022     (H) 06/06/2022     Lab Results   Component Value Date    GLUCOSE 97 05/28/2022    BUN 11 05/28/2022    CREATININE 0.90 05/28/2022     (L) 05/28/2022    K 3.8 05/28/2022    CL 97 (L) 05/28/2022    CO2 23.9 05/28/2022    CALCIUM 8.8 05/28/2022    PROTEINTOT 6.2 05/28/2022    ALBUMIN 3.40 (L) 05/28/2022    ALT 43 (H) 05/28/2022    AST 64 (H) 05/28/2022    ALKPHOS 185 (H) 05/28/2022    BILITOT 0.6 05/28/2022    EGFRIFNONA 60 (L) 05/27/2022    GLOB 2.8 05/28/2022    AGRATIO 1.2 05/28/2022    BCR 12.2 05/28/2022    ANIONGAP 11.1 05/28/2022      Lab Results   Component Value Date    CHOL 178 11/15/2021    CHLPL 190 10/22/2020    TRIG 78 11/15/2021    HDL 65 (H) 11/15/2021    LDL 99 11/15/2021     Lab Results   Component Value Date    TSH 2.910  11/15/2021     Lab Results   Component Value Date    HGBA1C 5.4 05/20/2022     SCANNED - ECHOCARDIOGRAM (06/21/2022)  PATIENT CORRESPONDENCE - SCAN - DISCHARGESUMKYLEE/MG/[id number] (06/21/2022)           Assessment and Plan:   Today, we have reviewed her care.  I have reviewed the hospital discharge summary.  We will reach out to Mg for a copy of the blood work that was done there.  I am going to recommend she stay on the current medications that she was discharged on.  He remains an open question whether we should bump the losartan back up to 50 mg.  Hydrochlorothiazide was stopped while she was in the hospital.  She also has not been taking amitriptyline, and I have advised her to stay off of that.  No other short-term change will be made.  Of course there are concerns with ongoing benzodiazepine use, but for now we will make no change in the lorazepam.  We will see what her testing shows and move forward from there.        Diagnoses and all orders for this visit:    1. Syncope, unspecified syncope type (Primary)  -     CBC (No Diff); Future    2. Hyponatremia  -     TSH; Future    3. Anemia, unspecified type  -     CBC (No Diff); Future  -     Vitamin B12 & Folate; Future  -     Ferritin; Future  -     Iron Profile; Future    4. Other mixed anxiety disorders  Comments:  As above.    5. Other long term (current) drug therapy  Comments:  As above.    6. Essential (primary) hypertension  -     Comprehensive Metabolic Panel; Future    7. Mixed hyperlipidemia  -     Comprehensive Metabolic Panel; Future    8. Urinary tract infection without hematuria, site unspecified  -     Urinalysis With Microscopic - Urine, Clean Catch; Future          Follow Up   Return in about 6 weeks (around 8/11/2022) for Recheck, as scheduled.  Patient was given instructions and counseling regarding her condition or for health maintenance advice. Please see specific information pulled into the AVS if appropriate.

## 2022-07-01 LAB
ALBUMIN SERPL-MCNC: 4.1 G/DL (ref 3.5–5.2)
ALBUMIN/GLOB SERPL: 1.2 G/DL
ALP SERPL-CCNC: 144 U/L (ref 39–117)
ALT SERPL W P-5'-P-CCNC: 16 U/L (ref 1–33)
ANION GAP SERPL CALCULATED.3IONS-SCNC: 11.6 MMOL/L (ref 5–15)
AST SERPL-CCNC: 18 U/L (ref 1–32)
BILIRUB SERPL-MCNC: 0.2 MG/DL (ref 0–1.2)
BUN SERPL-MCNC: 16 MG/DL (ref 8–23)
BUN/CREAT SERPL: 14.3 (ref 7–25)
CALCIUM SPEC-SCNC: 9.4 MG/DL (ref 8.6–10.5)
CHLORIDE SERPL-SCNC: 97 MMOL/L (ref 98–107)
CO2 SERPL-SCNC: 25.4 MMOL/L (ref 22–29)
CREAT SERPL-MCNC: 1.12 MG/DL (ref 0.57–1)
EGFRCR SERPLBLD CKD-EPI 2021: 51.4 ML/MIN/1.73
FERRITIN SERPL-MCNC: 91.7 NG/ML (ref 13–150)
FOLATE SERPL-MCNC: 3.24 NG/ML (ref 4.78–24.2)
GLOBULIN UR ELPH-MCNC: 3.3 GM/DL
GLUCOSE SERPL-MCNC: 99 MG/DL (ref 65–99)
IRON 24H UR-MRATE: 50 MCG/DL (ref 37–145)
IRON SATN MFR SERPL: 16 % (ref 20–50)
POTASSIUM SERPL-SCNC: 4.1 MMOL/L (ref 3.5–5.2)
PROT SERPL-MCNC: 7.4 G/DL (ref 6–8.5)
SODIUM SERPL-SCNC: 134 MMOL/L (ref 136–145)
TIBC SERPL-MCNC: 307 MCG/DL (ref 298–536)
TRANSFERRIN SERPL-MCNC: 206 MG/DL (ref 200–360)
TSH SERPL DL<=0.05 MIU/L-ACNC: 1.98 UIU/ML (ref 0.27–4.2)
VIT B12 BLD-MCNC: 484 PG/ML (ref 211–946)

## 2022-07-01 RX ORDER — FOLIC ACID 1 MG/1
1 TABLET ORAL DAILY
Qty: 90 TABLET | Refills: 3 | Status: SHIPPED | OUTPATIENT
Start: 2022-07-01 | End: 2022-09-16 | Stop reason: SDUPTHER

## 2022-07-01 RX ORDER — LOSARTAN POTASSIUM 25 MG/1
25 TABLET ORAL DAILY
Qty: 90 TABLET | Refills: 1 | Status: SHIPPED | OUTPATIENT
Start: 2022-07-01 | End: 2022-09-16 | Stop reason: SDUPTHER

## 2022-07-07 ENCOUNTER — TELEPHONE (OUTPATIENT)
Dept: FAMILY MEDICINE CLINIC | Age: 77
End: 2022-07-07

## 2022-07-11 ENCOUNTER — TELEPHONE (OUTPATIENT)
Dept: FAMILY MEDICINE CLINIC | Age: 77
End: 2022-07-11

## 2022-07-11 NOTE — TELEPHONE ENCOUNTER
Caller: RYLEY VAZQUEZ    Relationship: Emergency Contact    Best call back number: [phone]    What is the best time to reach you: ANYTIME        What was the call regarding: PATIENTS  STATES THAT PATIENT IS HAVING ISSUES WITH NAUSEA AND IS WANTING TO KNOW IF ITS SAFE TO TAKE MECLIZINE.     Do you require a callback: YES

## 2022-07-12 RX ORDER — ONDANSETRON 4 MG/1
4 TABLET, ORALLY DISINTEGRATING ORAL EVERY 8 HOURS PRN
Qty: 30 TABLET | Refills: 0 | Status: SHIPPED | OUTPATIENT
Start: 2022-07-12 | End: 2022-08-12

## 2022-07-12 NOTE — TELEPHONE ENCOUNTER
I would lean against use of meclizine given her recent history and her medication list.  The medication could be sedating and could potentially increase fall risk.  Thanks.

## 2022-07-22 ENCOUNTER — OUTSIDE FACILITY SERVICE (OUTPATIENT)
Dept: FAMILY MEDICINE CLINIC | Age: 77
End: 2022-07-22

## 2022-07-22 PROCEDURE — OUTSIDEPOS PR OUTSIDE POS PLACEHOLDER: Performed by: FAMILY MEDICINE

## 2022-07-29 ENCOUNTER — TELEPHONE (OUTPATIENT)
Dept: FAMILY MEDICINE CLINIC | Age: 77
End: 2022-07-29

## 2022-07-29 NOTE — TELEPHONE ENCOUNTER
Caller: Theresa Davis    Relationship to patient: Self    Best call back number: [phone]     Patient is needing: PATIENT IS CALLING IN TO SEE IF SHE CAN GET AN APPOINTMENT FOR 3 OR AFTER FOR HER APPOINTMENT ON 8/11/2022 FOR A SMW VISIT. SHE HAS BROKEN HER LEG AND HER  CAN'T BRING HER EARLIER. NO APPOINTMENTS AVAILABLE FOR HUB TO SCHEDULE.

## 2022-08-12 ENCOUNTER — OFFICE VISIT (OUTPATIENT)
Dept: FAMILY MEDICINE CLINIC | Age: 77
End: 2022-08-12

## 2022-08-12 VITALS
SYSTOLIC BLOOD PRESSURE: 162 MMHG | DIASTOLIC BLOOD PRESSURE: 84 MMHG | BODY MASS INDEX: 24.59 KG/M2 | HEIGHT: 64 IN | WEIGHT: 144 LBS | HEART RATE: 67 BPM | TEMPERATURE: 98.5 F

## 2022-08-12 DIAGNOSIS — I10 ESSENTIAL (PRIMARY) HYPERTENSION: ICD-10-CM

## 2022-08-12 DIAGNOSIS — Z12.11 COLON CANCER SCREENING: ICD-10-CM

## 2022-08-12 DIAGNOSIS — Z00.00 MEDICARE ANNUAL WELLNESS VISIT, SUBSEQUENT: Primary | ICD-10-CM

## 2022-08-12 DIAGNOSIS — Z23 IMMUNIZATION DUE: ICD-10-CM

## 2022-08-12 PROCEDURE — 1170F FXNL STATUS ASSESSED: CPT

## 2022-08-12 PROCEDURE — G0439 PPPS, SUBSEQ VISIT: HCPCS

## 2022-08-12 PROCEDURE — 1159F MED LIST DOCD IN RCRD: CPT

## 2022-08-12 NOTE — PROGRESS NOTES
The ABCs of the Annual Wellness Visit  Subsequent Medicare Wellness Visit    Chief Complaint   Patient presents with   • Medicare Wellness-subsequent      Subjective    History of Present Illness:  Theresa Davis is a 77 y.o. female who presents for a Subsequent Medicare Wellness Visit.    Patient's  Lex is present with her today contributing to history.    Patient is technically due for a colonoscopy given that it has been 10 years since her last one however she is also now 77 years old which is when the screening window ends per guideline recommendations.    Patient is due for her fourth COVID-vaccine as well as Shingrix.    She is up-to-date on her pneumonia vaccine    She has a mammogram scheduled for 8-.    She is up-to-date on her DEXA scan and not due until 2023    She reports its been a long time since she has been to the dentist  She reports she went to the eye doctor approximately a year ago.    Patient has never smoked therefore does not require lung cancer screenings.    Patient's blood pressure is elevated in office today.  She does report that she takes her losartan in the mornings and has taken it today.  She denies any headaches, shortness of breath, chest pain, weakness or numbness.  He has a therapist that come to the house and checks it twice a week and it has been normal.  She does endorse a bit of whitecoat hypertension.    Dentist: a long time 1999  Eye doctor: 1 yearmagaly     The following portions of the patient's history were reviewed and   updated as appropriate: allergies, current medications, past family history, past medical history, past social history, past surgical history and problem list.    Compared to one year ago, the patient feels her physical   health is the same.    Compared to one year ago, the patient feels her mental   health is the same.    Recent Hospitalizations:  She was admitted within the past 365 days at [facility] for 1 week for broken legs      Current Medical Providers:  Patient Care Team:  Waylon Tang MD as PCP - General (Family Medicine)    Outpatient Medications Prior to Visit   Medication Sig Dispense Refill   • denosumab (PROLIA) 60 MG/ML solution prefilled syringe syringe Inject 1 mL under the skin into the appropriate area as directed Every 6 (Six) Months. T score -2.5, Dx M81.0 1 mL 2   • folic acid (FOLVITE) 1 MG tablet Take 1 tablet by mouth Daily. 90 tablet 3   • LORazepam (ATIVAN) 0.5 MG tablet Take 0.5-1 tablets by mouth 2 (Two) Times a Day As Needed for Anxiety. 40 tablet 1   • losartan (COZAAR) 25 MG tablet Take 1 tablet by mouth Daily. 90 tablet 1   • Nutritional Supplements (QUINOA KALE & HEMP PO) As Needed.     • sertraline (ZOLOFT) 100 MG tablet Take 1 tablet by mouth Daily. 90 tablet 3   • simvastatin (ZOCOR) 20 MG tablet Take 1 tablet by mouth Every Night. 90 tablet 3   • ondansetron ODT (ZOFRAN-ODT) 4 MG disintegrating tablet Place 1 tablet on the tongue Every 8 (Eight) Hours As Needed for Nausea. 30 tablet 0     No facility-administered medications prior to visit.       No opioid medication identified on active medication list. I have reviewed chart for other potential  high risk medication/s and harmful drug interactions in the elderly.          Aspirin is not on active medication list.  Aspirin use is not indicated based on review of current medical condition/s. Risk of harm outweighs potential benefits.  .    Patient Active Problem List   Diagnosis   • Other mixed anxiety disorders   • Mixed hyperlipidemia   • Essential (primary) hypertension   • Other long term (current) drug therapy   • Primary osteoarthritis of right knee   • Right knee pain   • Immunization due   • Medicare annual wellness visit, subsequent     Advance Care Planning  Advance Directive is not on file.  ACP discussion was held with the patient during this visit. Patient does not have an advance directive, information provided.   will make  "decsions, then her son         Objective    Vitals:    08/12/22 1502 08/12/22 1512   BP: 173/84 162/84   BP Location: Right arm Left arm   Patient Position: Sitting Sitting   Pulse: 73 67   Temp: 98.5 °F (36.9 °C)    TempSrc: Oral    Weight: 65.3 kg (144 lb)    Height: 162.6 cm (64.02\")      Estimated body mass index is 24.7 kg/m² as calculated from the following:    Height as of this encounter: 162.6 cm (64.02\").    Weight as of this encounter: 65.3 kg (144 lb).    BMI is within normal parameters. No other follow-up for BMI required.    Does the patient have evidence of cognitive impairment? No    Physical Exam  Lab Results   Component Value Date    GLU 93 05/27/2022    HGBA1C 5.4 05/20/2022            HEALTH RISK ASSESSMENT    Smoking Status:  Social History     Tobacco Use   Smoking Status Never Smoker   Smokeless Tobacco Never Used     Alcohol Consumption:  Social History     Substance and Sexual Activity   Alcohol Use Never     Fall Risk Screen:    MICHELLE Fall Risk Assessment was completed, and patient is at HIGH risk for falls. Assessment completed on:8/12/2022    Depression Screening:  PHQ-2/PHQ-9 Depression Screening 8/12/2022   Retired PHQ-9 Total Score -   Retired Total Score -   Little Interest or Pleasure in Doing Things 0-->not at all   Feeling Down, Depressed or Hopeless 1-->several days   PHQ-9: Brief Depression Severity Measure Score 1       Health Habits and Functional and Cognitive Screening:  Functional & Cognitive Status 8/12/2022   Do you have difficulty preparing food and eating? No   Do you have difficulty bathing yourself, getting dressed or grooming yourself? No   Do you have difficulty using the toilet? No   Do you have difficulty moving around from place to place? No   Do you have trouble with steps or getting out of a bed or a chair? Yes   Current Diet Well Balanced Diet   Dental Exam Not up to date   Eye Exam Up to date   Exercise (times per week) 2 times per week   Current Exercises " Include Other   Do you need help using the phone?  No   Are you deaf or do you have serious difficulty hearing?  No   Do you need help with transportation? No   Do you need help shopping? No   Do you need help preparing meals?  No   Do you need help with housework?  No   Do you need help with laundry? No   Do you need help taking your medications? No   Do you need help managing money? No   Do you ever drive or ride in a car without wearing a seat belt? No   Have you felt unusual stress, anger or loneliness in the last month? No   Who do you live with? Spouse   If you need help, do you have trouble finding someone available to you? No   Have you been bothered in the last four weeks by sexual problems? No   Do you have difficulty concentrating, remembering or making decisions? No       Age-appropriate Screening Schedule:  Refer to the list below for future screening recommendations based on patient's age, sex and/or medical conditions. Orders for these recommended tests are listed in the plan section. The patient has been provided with a written plan.    Health Maintenance   Topic Date Due   • TDAP/TD VACCINES (1 - Tdap) Never done   • ZOSTER VACCINE (1 of 2) Never done   • INFLUENZA VACCINE  10/01/2022   • LIPID PANEL  11/15/2022   • DXA SCAN  11/09/2023              Assessment & Plan   CMS Preventative Services Quick Reference  Risk Factors Identified During Encounter  Cardiovascular Disease  Fall Risk-High or Moderate  Immunizations Discussed/Encouraged (specific Immunizations; Shingrix and COVID19  The above risks/problems have been discussed with the patient.  Follow up actions/plans if indicated are seen below in the Assessment/Plan Section.  Pertinent information has been shared with the patient in the After Visit Summary.    Diagnoses and all orders for this visit:    1. Medicare annual wellness visit, subsequent (Primary)  Comments:  Advised routine eye exams.  Patient is due for dental exam and strongly  encouraged her to schedule this.  Advised on falls prevention    2. Immunization due  Assessment & Plan:  Due for COVID #4, shingrix. Patient to obtain at pharmacy if interested.       3. Colon cancer screening    4. Essential (primary) hypertension  Assessment & Plan:  Blood pressure elevated. Patient asymtpomatic. She reports therapists are monitoring it at home and it is normal. Advised ambulatory monitoring. Follow up with PCP       Technically, patient is due for a colonoscopy this year.  However she is now 77 years old.  We discussed possibly obtaining 1 last colonoscopy prior to opting out due to age.  We also discussed Cologuard if she preferred a less invasive option.  Patient states she will think about her options and call back should she make a decision.  Patient is up-to-date on her other screenings.    Follow Up:   Return in about 4 weeks (around 9/9/2022) for Next scheduled follow up.     An After Visit Summary and PPPS were made available to the patient.

## 2022-08-29 ENCOUNTER — HOSPITAL ENCOUNTER (OUTPATIENT)
Dept: MAMMOGRAPHY | Facility: HOSPITAL | Age: 77
Discharge: HOME | End: 2022-08-29
Admitting: FAMILY MEDICINE

## 2022-08-29 DIAGNOSIS — Z12.31 OTHER SCREENING MAMMOGRAM: ICD-10-CM

## 2022-08-29 PROCEDURE — 77067 SCR MAMMO BI INCL CAD: CPT

## 2022-08-29 PROCEDURE — 77063 BREAST TOMOSYNTHESIS BI: CPT

## 2022-08-31 DIAGNOSIS — F41.3 OTHER MIXED ANXIETY DISORDERS: ICD-10-CM

## 2022-08-31 DIAGNOSIS — Z79.899 OTHER LONG TERM (CURRENT) DRUG THERAPY: ICD-10-CM

## 2022-09-01 RX ORDER — LORAZEPAM 0.5 MG/1
0.25-0.5 TABLET ORAL 2 TIMES DAILY PRN
Qty: 40 TABLET | Refills: 0 | Status: SHIPPED | OUTPATIENT
Start: 2022-09-01 | End: 2022-09-16 | Stop reason: SDUPTHER

## 2022-09-16 ENCOUNTER — HOSPITAL ENCOUNTER (OUTPATIENT)
Dept: GENERAL RADIOLOGY | Facility: HOSPITAL | Age: 77
Discharge: HOME | End: 2022-09-16

## 2022-09-16 ENCOUNTER — OFFICE VISIT (OUTPATIENT)
Dept: FAMILY MEDICINE CLINIC | Age: 77
End: 2022-09-16

## 2022-09-16 ENCOUNTER — LAB (OUTPATIENT)
Dept: LAB | Facility: HOSPITAL | Age: 77
End: 2022-09-16

## 2022-09-16 VITALS
TEMPERATURE: 98.2 F | SYSTOLIC BLOOD PRESSURE: 153 MMHG | WEIGHT: 143.6 LBS | DIASTOLIC BLOOD PRESSURE: 82 MMHG | BODY MASS INDEX: 24.52 KG/M2 | HEIGHT: 64 IN | HEART RATE: 66 BPM

## 2022-09-16 DIAGNOSIS — D64.9 ANEMIA, UNSPECIFIED TYPE: ICD-10-CM

## 2022-09-16 DIAGNOSIS — R79.89 ELEVATED SERUM CREATININE: ICD-10-CM

## 2022-09-16 DIAGNOSIS — E53.8 FOLIC ACID DEFICIENCY: ICD-10-CM

## 2022-09-16 DIAGNOSIS — I10 ESSENTIAL (PRIMARY) HYPERTENSION: Primary | ICD-10-CM

## 2022-09-16 DIAGNOSIS — F41.3 OTHER MIXED ANXIETY DISORDERS: ICD-10-CM

## 2022-09-16 DIAGNOSIS — R51.9 CHRONIC NONINTRACTABLE HEADACHE, UNSPECIFIED HEADACHE TYPE: ICD-10-CM

## 2022-09-16 DIAGNOSIS — E78.2 MIXED HYPERLIPIDEMIA: ICD-10-CM

## 2022-09-16 DIAGNOSIS — G89.29 CHRONIC NONINTRACTABLE HEADACHE, UNSPECIFIED HEADACHE TYPE: ICD-10-CM

## 2022-09-16 DIAGNOSIS — Z79.899 OTHER LONG TERM (CURRENT) DRUG THERAPY: ICD-10-CM

## 2022-09-16 DIAGNOSIS — Z23 ENCOUNTER FOR IMMUNIZATION: ICD-10-CM

## 2022-09-16 DIAGNOSIS — Z12.11 SCREEN FOR COLON CANCER: ICD-10-CM

## 2022-09-16 LAB
AMPHET+METHAMPHET UR QL: NEGATIVE
AMPHETAMINES UR QL: NEGATIVE
BARBITURATES UR QL SCN: NEGATIVE
BENZODIAZ UR QL SCN: POSITIVE
BUPRENORPHINE SERPL-MCNC: NEGATIVE NG/ML
CANNABINOIDS SERPL QL: NEGATIVE
COCAINE UR QL: NEGATIVE
DEPRECATED RDW RBC AUTO: 43.6 FL (ref 37–54)
ERYTHROCYTE [DISTWIDTH] IN BLOOD BY AUTOMATED COUNT: 13.3 % (ref 12.3–15.4)
EXPIRATION DATE: ABNORMAL
HCT VFR BLD AUTO: 36.4 % (ref 34–46.6)
HGB BLD-MCNC: 11.7 G/DL (ref 12–15.9)
Lab: ABNORMAL
MCH RBC QN AUTO: 28.3 PG (ref 26.6–33)
MCHC RBC AUTO-ENTMCNC: 32.1 G/DL (ref 31.5–35.7)
MCV RBC AUTO: 88.1 FL (ref 79–97)
MDMA UR QL SCN: NEGATIVE
METHADONE UR QL SCN: NEGATIVE
OPIATES UR QL: NEGATIVE
OXYCODONE UR QL SCN: NEGATIVE
PCP UR QL SCN: NEGATIVE
PLATELET # BLD AUTO: 333 10*3/MM3 (ref 140–450)
PMV BLD AUTO: 8.7 FL (ref 6–12)
RBC # BLD AUTO: 4.13 10*6/MM3 (ref 3.77–5.28)
THC INTERNAL CONTROL: ABNORMAL
WBC NRBC COR # BLD: 6.7 10*3/MM3 (ref 3.4–10.8)

## 2022-09-16 PROCEDURE — 84466 ASSAY OF TRANSFERRIN: CPT | Performed by: FAMILY MEDICINE

## 2022-09-16 PROCEDURE — 82746 ASSAY OF FOLIC ACID SERUM: CPT

## 2022-09-16 PROCEDURE — 70220 X-RAY EXAM OF SINUSES: CPT

## 2022-09-16 PROCEDURE — 36415 COLL VENOUS BLD VENIPUNCTURE: CPT

## 2022-09-16 PROCEDURE — 80048 BASIC METABOLIC PNL TOTAL CA: CPT

## 2022-09-16 PROCEDURE — 80305 DRUG TEST PRSMV DIR OPT OBS: CPT | Performed by: FAMILY MEDICINE

## 2022-09-16 PROCEDURE — 99214 OFFICE O/P EST MOD 30 MIN: CPT | Performed by: FAMILY MEDICINE

## 2022-09-16 PROCEDURE — G0008 ADMIN INFLUENZA VIRUS VAC: HCPCS | Performed by: FAMILY MEDICINE

## 2022-09-16 PROCEDURE — 82607 VITAMIN B-12: CPT

## 2022-09-16 PROCEDURE — 85652 RBC SED RATE AUTOMATED: CPT

## 2022-09-16 PROCEDURE — 90662 IIV NO PRSV INCREASED AG IM: CPT | Performed by: FAMILY MEDICINE

## 2022-09-16 PROCEDURE — 85027 COMPLETE CBC AUTOMATED: CPT

## 2022-09-16 PROCEDURE — 83540 ASSAY OF IRON: CPT | Performed by: FAMILY MEDICINE

## 2022-09-16 RX ORDER — LOSARTAN POTASSIUM 25 MG/1
25 TABLET ORAL DAILY
Qty: 90 TABLET | Refills: 3 | Status: SHIPPED | OUTPATIENT
Start: 2022-09-16 | End: 2023-03-23

## 2022-09-16 RX ORDER — FOLIC ACID 1 MG/1
1 TABLET ORAL DAILY
Qty: 90 TABLET | Refills: 3 | Status: SHIPPED | OUTPATIENT
Start: 2022-09-16 | End: 2023-03-23 | Stop reason: SDUPTHER

## 2022-09-16 RX ORDER — LORAZEPAM 0.5 MG/1
0.25-0.5 TABLET ORAL 2 TIMES DAILY PRN
Qty: 40 TABLET | Refills: 1 | Status: SHIPPED | OUTPATIENT
Start: 2022-09-16 | End: 2022-11-29

## 2022-09-16 RX ORDER — SERTRALINE HYDROCHLORIDE 100 MG/1
100 TABLET, FILM COATED ORAL DAILY
Qty: 90 TABLET | Refills: 3 | Status: SHIPPED | OUTPATIENT
Start: 2022-09-16 | End: 2023-03-23 | Stop reason: SDUPTHER

## 2022-09-16 RX ORDER — SIMVASTATIN 20 MG
20 TABLET ORAL NIGHTLY
Qty: 90 TABLET | Refills: 3 | Status: SHIPPED | OUTPATIENT
Start: 2022-09-16 | End: 2023-03-23 | Stop reason: SDUPTHER

## 2022-09-16 NOTE — PROGRESS NOTES
Theresa Davis presents to [facility].    Chief Complaint:  Blood pressure, anxiety, anemia, elevated creatinine    Subjective       History of Present Illness:  Theresa is in today for follow up on her care.  She has hypertension for which she remains on losartan.  She has the blood pressure checked a couple of times per week by home health.  She has been told that the blood pressure is fairly normal there.    She also has chronic anxiety for which she takes lorazepam currently.  She continues to have significant stressors related to her family and does not feel that we could make any change at this time.  She also remains on sertraline and tolerates it.  Risks are discussed with her again today.  Issac is okay.      She was also noted to have a mild anemia and a mild elevation of her creatinine on her most recent labs.    Theresa is also concerned about headache.  She has been having some frontal headaches which have been noted for the last few weeks.  She denies any recent injury.  She did have a significant laceration of the right side of her forehead earlier in the year.  The headache will come and go to some degree.  It can last up to a couple of days when present.  She says the pain is a 10/10 when present.  She says that she cannot stand headaches.  The pain can involve the right side of the forehead more than the left.  She has occasional nausea with the headache.  She is currently taking Tylenol as needed for the headache.    Review of Systems:  Review of Systems   Constitutional: Negative for chills and fever.   Respiratory: Negative for cough and shortness of breath.    Cardiovascular: Negative for chest pain and palpitations.   Gastrointestinal: Negative for abdominal pain, nausea and vomiting.        Objective   Medical History:  Past Medical History:   • Anemia, unspecified   • Cervicalgia   • Essential (primary) hypertension   • Mixed hyperlipidemia   • Other long term  (current) drug therapy   • Other mixed anxiety disorders     Past Surgical History:   • APPENDECTOMY   • CARPAL TUNNEL RELEASE   • COLONOSCOPY    Normal   • HYSTERECTOMY    PARTIAL   • THORACIC OUTLET SURGERY      Family History   Problem Relation Age of Onset   • Breast cancer Mother    • Coronary artery disease Father      Social History     Tobacco Use   • Smoking status: Never Smoker   • Smokeless tobacco: Never Used   Substance Use Topics   • Alcohol use: Never       Health Maintenance Due   Topic Date Due   • TDAP/TD VACCINES (1 - Tdap) Never done   • ZOSTER VACCINE (1 of 2) Never done   • COVID-19 Vaccine (4 - Booster for Pfizer series) 04/23/2022   • COLORECTAL CANCER SCREENING  06/20/2022        Immunization History   Administered Date(s) Administered   • COVID-19 (PFIZER) PURPLE CAP 03/09/2021, 03/30/2021, 12/23/2021   • Fluzone High Dose =>65 Years ([employer] ONLY) 10/21/2012, 10/10/2013, 10/02/2017   • Fluzone High-Dose 65+yrs 01/12/2022   • Influenza, Unspecified 09/23/2020   • Pneumococcal Conjugate 13-Valent (PCV13) 08/12/2015   • Pneumococcal Polysaccharide (PPSV23) 08/17/2012, 08/17/2012       Allergies   Allergen Reactions   • Penicillins Rash and GI Intolerance   • Sulfa Antibiotics Unknown - Low Severity        Medications:  Current Outpatient Medications on File Prior to Visit   Medication Sig   • denosumab (PROLIA) 60 MG/ML solution prefilled syringe syringe Inject 1 mL under the skin into the appropriate area as directed Every 6 (Six) Months. T score -2.5, Dx M81.0   • Nutritional Supplements (QUINOA KALE & HEMP PO) As Needed.   • [DISCONTINUED] folic acid (FOLVITE) 1 MG tablet Take 1 tablet by mouth Daily.   • [DISCONTINUED] LORazepam (ATIVAN) 0.5 MG tablet Take 0.5-1 tablets by mouth 2 (Two) Times a Day As Needed for Anxiety.   • [DISCONTINUED] losartan (COZAAR) 25 MG tablet Take 1 tablet by mouth Daily.   • [DISCONTINUED] sertraline (ZOLOFT) 100 MG tablet Take 1 tablet by mouth Daily.   •  "[DISCONTINUED] simvastatin (ZOCOR) 20 MG tablet Take 1 tablet by mouth Every Night.     No current facility-administered medications on file prior to visit.       Vital Signs:   Vitals:    09/16/22 1033 09/16/22 1104   BP: 150/82 153/82   BP Location: Left arm Left arm   Patient Position: Sitting Sitting   Pulse: 71 66   Temp: 98.2 °F (36.8 °C)    TempSrc: Oral    Weight: 65.1 kg (143 lb 9.6 oz)    Height: 162.6 cm (64.02\")          Physical Exam:  Physical Exam  Vitals reviewed.   Constitutional:       General: She is not in acute distress.     Appearance: She is not ill-appearing.   HENT:      Right Ear: Tympanic membrane and ear canal normal.      Left Ear: Tympanic membrane and ear canal normal.      Nose:      Comments: There is tenderness of the forehead just superior to the bridge of the nose.  There is a scar over the right eyebrow that is well-healed.  It is mildly tender.  Eyes:      Pupils: Pupils are equal, round, and reactive to light.   Neck:      Comments: No thyromegaly  Cardiovascular:      Rate and Rhythm: Normal rate and regular rhythm.   Pulmonary:      Effort: Pulmonary effort is normal.      Breath sounds: Normal breath sounds.   Abdominal:      General: There is no distension.      Palpations: Abdomen is soft.      Tenderness: There is no abdominal tenderness.   Musculoskeletal:      Cervical back: Neck supple.   Lymphadenopathy:      Cervical: No cervical adenopathy.   Skin:     Findings: No lesion or rash.   Neurological:      General: No focal deficit present.      Mental Status: She is alert and oriented to person, place, and time.      Cranial Nerves: No cranial nerve deficit.      Motor: No weakness.         Result Review      The following data was reviewed by Waylon Tang MD on 09/16/2022.  Lab Results   Component Value Date    WBC 6.03 06/30/2022    HGB 10.8 (L) 06/30/2022    HCT 33.6 (L) 06/30/2022    MCV 92.1 06/30/2022     06/30/2022     Lab Results   Component " Value Date    GLUCOSE 99 06/30/2022    BUN 16 06/30/2022    CREATININE 1.12 (H) 06/30/2022     (L) 06/30/2022    K 4.1 06/30/2022    CL 97 (L) 06/30/2022    CO2 25.4 06/30/2022    CALCIUM 9.4 06/30/2022    PROTEINTOT 7.4 06/30/2022    ALBUMIN 4.10 06/30/2022    ALT 16 06/30/2022    AST 18 06/30/2022    ALKPHOS 144 (H) 06/30/2022    BILITOT 0.2 06/30/2022    EGFRIFNONA 60 (L) 05/27/2022    GLOB 3.3 06/30/2022    AGRATIO 1.2 06/30/2022    BCR 14.3 06/30/2022    ANIONGAP 11.6 06/30/2022      Lab Results   Component Value Date    CHOL 178 11/15/2021    CHLPL 190 10/22/2020    TRIG 78 11/15/2021    HDL 65 (H) 11/15/2021    LDL 99 11/15/2021     Lab Results   Component Value Date    TSH 1.980 06/30/2022     Lab Results   Component Value Date    HGBA1C 5.4 05/20/2022            Assessment and Plan:   Today, we have reviewed her care.  She is struggling somewhat with the headache.  We will do some additional evaluation related to this.  I want to make sure her sinuses look okay.  This is a different headache than her typical migraine headache.  Otherwise, we will recheck her blood pressure today.  It is mildly elevated.  Her medications will be refilled to [facility] per her preference.  We will repeat some blood work as well.  With regard to lorazepam, she has been taking it for a long period of time, and it does continue to be helpful for her.  Flu shot will be given today.        Diagnoses and all orders for this visit:    1. Essential (primary) hypertension (Primary)  -     losartan (COZAAR) 25 MG tablet; Take 1 tablet by mouth Daily.  Dispense: 90 tablet; Refill: 3    2. Mixed hyperlipidemia  -     simvastatin (ZOCOR) 20 MG tablet; Take 1 tablet by mouth Every Night.  Dispense: 90 tablet; Refill: 3    3. Anemia, unspecified type  -     CBC (No Diff); Future  -     Vitamin B12 & Folate; Future  -     Iron Profile    4. Elevated serum creatinine  -     Basic Metabolic Panel; Future    5. Chronic nonintractable headache,  unspecified headache type  -     Sedimentation Rate; Future  -     XR Sinuses 3+ View; Future    6. Other mixed anxiety disorders  -     POC Urine Drug Screen [facility]  -     LORazepam (ATIVAN) 0.5 MG tablet; Take 0.5-1 tablets by mouth 2 (Two) Times a Day As Needed for Anxiety.  Dispense: 40 tablet; Refill: 1  -     sertraline (ZOLOFT) 100 MG tablet; Take 1 tablet by mouth Daily.  Dispense: 90 tablet; Refill: 3    7. Other long term (current) drug therapy  -     POC Urine Drug Screen [facility]  -     LORazepam (ATIVAN) 0.5 MG tablet; Take 0.5-1 tablets by mouth 2 (Two) Times a Day As Needed for Anxiety.  Dispense: 40 tablet; Refill: 1    8. Folic acid deficiency  -     Vitamin B12 & Folate; Future  -     folic acid (FOLVITE) 1 MG tablet; Take 1 tablet by mouth Daily.  Dispense: 90 tablet; Refill: 3    9. Encounter for immunization  -     Fluzone High-Dose 65+yrs ([id number])    10. Screen for colon cancer  -     Cologuard - Stool, Per Rectum; Future          Follow Up   Return in about 6 months (around 3/16/2023) for Recheck.  Patient was given instructions and counseling regarding her condition or for health maintenance advice. Please see specific information pulled into the AVS if appropriate.

## 2022-09-17 LAB
ANION GAP SERPL CALCULATED.3IONS-SCNC: 7.5 MMOL/L (ref 5–15)
BUN SERPL-MCNC: 18 MG/DL (ref 8–23)
BUN/CREAT SERPL: 16.8 (ref 7–25)
CALCIUM SPEC-SCNC: 10 MG/DL (ref 8.6–10.5)
CHLORIDE SERPL-SCNC: 101 MMOL/L (ref 98–107)
CO2 SERPL-SCNC: 24.5 MMOL/L (ref 22–29)
CREAT SERPL-MCNC: 1.07 MG/DL (ref 0.57–1)
EGFRCR SERPLBLD CKD-EPI 2021: 54.3 ML/MIN/1.73
ERYTHROCYTE [SEDIMENTATION RATE] IN BLOOD: 18 MM/HR (ref 0–30)
FOLATE SERPL-MCNC: 14.3 NG/ML (ref 4.78–24.2)
GLUCOSE SERPL-MCNC: 88 MG/DL (ref 65–99)
IRON 24H UR-MRATE: 90 MCG/DL (ref 37–145)
IRON SATN MFR SERPL: 27 % (ref 20–50)
POTASSIUM SERPL-SCNC: 4 MMOL/L (ref 3.5–5.2)
SODIUM SERPL-SCNC: 133 MMOL/L (ref 136–145)
TIBC SERPL-MCNC: 331 MCG/DL (ref 298–536)
TRANSFERRIN SERPL-MCNC: 222 MG/DL (ref 200–360)
VIT B12 BLD-MCNC: 399 PG/ML (ref 211–946)

## 2022-11-28 DIAGNOSIS — Z79.899 OTHER LONG TERM (CURRENT) DRUG THERAPY: ICD-10-CM

## 2022-11-28 DIAGNOSIS — F41.3 OTHER MIXED ANXIETY DISORDERS: ICD-10-CM

## 2022-11-29 RX ORDER — LORAZEPAM 0.5 MG/1
TABLET ORAL
Qty: 40 TABLET | Refills: 1 | Status: SHIPPED | OUTPATIENT
Start: 2022-11-29 | End: 2023-01-05

## 2023-01-05 DIAGNOSIS — F41.3 OTHER MIXED ANXIETY DISORDERS: ICD-10-CM

## 2023-01-05 DIAGNOSIS — Z79.899 OTHER LONG TERM (CURRENT) DRUG THERAPY: ICD-10-CM

## 2023-01-05 RX ORDER — LORAZEPAM 0.5 MG/1
TABLET ORAL
Qty: 40 TABLET | Refills: 1 | Status: SHIPPED | OUTPATIENT
Start: 2023-01-05 | End: 2023-02-23

## 2023-02-08 ENCOUNTER — TELEPHONE (OUTPATIENT)
Dept: FAMILY MEDICINE CLINIC | Age: 78
End: 2023-02-08

## 2023-02-08 NOTE — TELEPHONE ENCOUNTER
Caller: Theresa Davis    Relationship to patient: Self    Best call back number: [phone]    Chief complaint: RIGHT SIDE BURNING AND PAINFUL    Type of visit: OFFICE/SAME    Requested date: ASAP     Additional notes:THE PATIENT WOULD LIKE A CALL BACK TO SCHEDULE WITH PCP ONLY

## 2023-02-10 ENCOUNTER — OFFICE VISIT (OUTPATIENT)
Dept: FAMILY MEDICINE CLINIC | Age: 78
End: 2023-02-10
Payer: MEDICARE

## 2023-02-10 VITALS
HEART RATE: 56 BPM | TEMPERATURE: 98.3 F | HEIGHT: 64 IN | SYSTOLIC BLOOD PRESSURE: 122 MMHG | DIASTOLIC BLOOD PRESSURE: 81 MMHG | WEIGHT: 146 LBS | BODY MASS INDEX: 24.92 KG/M2

## 2023-02-10 DIAGNOSIS — R30.0 DYSURIA: Primary | ICD-10-CM

## 2023-02-10 DIAGNOSIS — B02.9 HERPES ZOSTER WITHOUT COMPLICATION: ICD-10-CM

## 2023-02-10 LAB
BACTERIA UR QL AUTO: ABNORMAL /HPF
BILIRUB UR QL STRIP: ABNORMAL
CLARITY UR: ABNORMAL
COLOR UR: ABNORMAL
GLUCOSE UR STRIP-MCNC: NEGATIVE MG/DL
HGB UR QL STRIP.AUTO: NEGATIVE
HYALINE CASTS UR QL AUTO: ABNORMAL /LPF
KETONES UR QL STRIP: NEGATIVE
LEUKOCYTE ESTERASE UR QL STRIP.AUTO: ABNORMAL
NITRITE UR QL STRIP: POSITIVE
PH UR STRIP.AUTO: 5.5 [PH] (ref 5–8)
PROT UR QL STRIP: ABNORMAL
RBC # UR STRIP: ABNORMAL /HPF
REF LAB TEST METHOD: ABNORMAL
SP GR UR STRIP: 1.02 (ref 1–1.03)
SQUAMOUS #/AREA URNS HPF: ABNORMAL /HPF
UROBILINOGEN UR QL STRIP: ABNORMAL
WBC # UR STRIP: ABNORMAL /HPF

## 2023-02-10 PROCEDURE — 81001 URINALYSIS AUTO W/SCOPE: CPT | Performed by: FAMILY MEDICINE

## 2023-02-10 PROCEDURE — 99213 OFFICE O/P EST LOW 20 MIN: CPT | Performed by: FAMILY MEDICINE

## 2023-02-10 PROCEDURE — 87086 URINE CULTURE/COLONY COUNT: CPT | Performed by: FAMILY MEDICINE

## 2023-02-10 RX ORDER — CEPHALEXIN 500 MG/1
500 CAPSULE ORAL 2 TIMES DAILY
Qty: 21 CAPSULE | Refills: 0 | Status: SHIPPED | OUTPATIENT
Start: 2023-02-10 | End: 2023-03-23

## 2023-02-10 NOTE — PROGRESS NOTES
Theresa Davis presents to [facility].    Chief Complaint:  'she's been burning'    Subjective       History of Present Illness:  Theresa is in today for evaluation of presumed UTI.  She has been having burning with urination for the last few weeks.  She denies fever or chills with this.  She feels like her urinary stream is fairly normal.  Her  states that she is urinating more frequently.    Review of Systems:  Review of Systems   Constitutional: Negative for chills and fever.   Respiratory: Negative for cough and shortness of breath (I'm breathing kind of more heard).    Cardiovascular: Negative for chest pain and palpitations.   Gastrointestinal: Negative for abdominal pain, nausea and vomiting.        Objective   Medical History:  Past Medical History:   • Anemia, unspecified   • Cervicalgia   • Essential (primary) hypertension   • Mixed hyperlipidemia   • Other long term (current) drug therapy   • Other mixed anxiety disorders     Past Surgical History:   • APPENDECTOMY   • CARPAL TUNNEL RELEASE   • COLONOSCOPY    Normal   • HYSTERECTOMY    PARTIAL   • THORACIC OUTLET SURGERY      Family History   Problem Relation Age of Onset   • Breast cancer Mother    • Coronary artery disease Father      Social History     Tobacco Use   • Smoking status: Never   • Smokeless tobacco: Never   Substance Use Topics   • Alcohol use: Never       Health Maintenance Due   Topic Date Due   • TDAP/TD VACCINES (1 - Tdap) Never done   • ZOSTER VACCINE (1 of 2) Never done   • COVID-19 Vaccine (4 - Booster for Pfizer series) 02/17/2022   • LIPID PANEL  11/15/2022        Immunization History   Administered Date(s) Administered   • COVID-19 (PFIZER) PURPLE CAP 03/09/2021, 03/30/2021, 12/23/2021   • Fluzone High Dose =>65 Years ([employer] ONLY) 10/21/2012, 10/10/2013, 10/02/2017   • Fluzone High-Dose 65+yrs 01/12/2022, 09/16/2022   • Influenza, Unspecified 09/23/2020   • Pneumococcal Conjugate 13-Valent  "(PCV13) 08/12/2015   • Pneumococcal Polysaccharide (PPSV23) 08/17/2012, 08/17/2012       Allergies   Allergen Reactions   • Penicillins Rash and GI Intolerance   • Sulfa Antibiotics Unknown - Low Severity        Medications:  Current Outpatient Medications on File Prior to Visit   Medication Sig   • AZO-CRANBERRY PO Take  by mouth.   • folic acid (FOLVITE) 1 MG tablet Take 1 tablet by mouth Daily.   • LORazepam (ATIVAN) 0.5 MG tablet Take 1/2 to 1 tablet by mouth BY MOUTH TWICE DAILY AS NEEDED FOR anxiety   • losartan (COZAAR) 25 MG tablet Take 1 tablet by mouth Daily.   • Nutritional Supplements (QUINOA KALE & HEMP PO) As Needed.   • sertraline (ZOLOFT) 100 MG tablet Take 1 tablet by mouth Daily.   • simvastatin (ZOCOR) 20 MG tablet Take 1 tablet by mouth Every Night.   • denosumab (PROLIA) 60 MG/ML solution prefilled syringe syringe Inject 1 mL under the skin into the appropriate area as directed Every 6 (Six) Months. T score -2.5, Dx M81.0     No current facility-administered medications on file prior to visit.       Vital Signs:   /81 (BP Location: Right arm, Patient Position: Sitting, Cuff Size: Adult)   Pulse 56   Temp 98.3 °F (36.8 °C) (Oral)   Ht 162.6 cm (64.02\")   Wt 66.2 kg (146 lb)   BMI 25.05 kg/m²       Physical Exam:  Physical Exam  Vitals reviewed.   Constitutional:       General: She is not in acute distress.     Appearance: She is not ill-appearing.   Eyes:      Pupils: Pupils are equal, round, and reactive to light.   Neck:      Comments: No thyromegaly  Cardiovascular:      Rate and Rhythm: Normal rate and regular rhythm.   Pulmonary:      Effort: Pulmonary effort is normal.      Breath sounds: Normal breath sounds.   Abdominal:      General: There is no distension.      Palpations: Abdomen is soft.      Tenderness: There is no abdominal tenderness.   Musculoskeletal:      Cervical back: Neck supple.   Lymphadenopathy:      Cervical: No cervical adenopathy.   Skin:     Findings: Rash " (There is a rash noted on R flank consistent with shingles) present. No lesion.   Neurological:      Mental Status: She is alert.         Result Review      The following data was reviewed by Waylon Tang MD on 02/10/2023.  Lab Results   Component Value Date    WBC 6.70 09/16/2022    HGB 11.7 (L) 09/16/2022    HCT 36.4 09/16/2022    MCV 88.1 09/16/2022     09/16/2022     Lab Results   Component Value Date    GLUCOSE 88 09/16/2022    BUN 18 09/16/2022    CREATININE 1.07 (H) 09/16/2022     (L) 09/16/2022    K 4.0 09/16/2022     09/16/2022    CO2 24.5 09/16/2022    CALCIUM 10.0 09/16/2022    PROTEINTOT 7.4 06/30/2022    ALBUMIN 4.10 06/30/2022    ALT 16 06/30/2022    AST 18 06/30/2022    ALKPHOS 144 (H) 06/30/2022    BILITOT 0.2 06/30/2022    EGFRIFNONA 60 (L) 05/27/2022    GLOB 3.3 06/30/2022    AGRATIO 1.2 06/30/2022    BCR 16.8 09/16/2022    ANIONGAP 7.5 09/16/2022      Lab Results   Component Value Date    CHOL 178 11/15/2021    CHLPL 190 10/22/2020    TRIG 78 11/15/2021    HDL 65 (H) 11/15/2021    LDL 99 11/15/2021     Lab Results   Component Value Date    TSH 1.980 06/30/2022     Lab Results   Component Value Date    HGBA1C 5.4 05/20/2022            Assessment and Plan:   Today, we have reviewed her care.  She may have a urinary tract infection but has been taking some Azo.  We will go ahead and cover her for potential UTI as noted and run testing.  We will reach out to her when those results come back early next week.  Additionally, she does have shingles in the right flank area.  This has been present for 1 to 2 weeks already, and medication for it would not be helpful.  She seems to be tolerating it okay.  For now, we will have her see how things progress.  No other short-term changes anticipated.       Diagnoses and all orders for this visit:    1. Dysuria (Primary)  -     Urinalysis With Microscopic - Urine, Clean Catch  -     Urine Culture - Urine, Urine, Clean Catch  -      cephalexin (KEFLEX) 500 MG capsule; Take 1 capsule by mouth 2 (Two) Times a Day.  Dispense: 21 capsule; Refill: 0    2. Herpes zoster without complication  Comments:  As above.          Follow Up   Return in about 5 weeks (around 3/14/2023) for Recheck as scheduled.  Patient was given instructions and counseling regarding her condition or for health maintenance advice. Please see specific information pulled into the AVS if appropriate.

## 2023-02-12 LAB — BACTERIA SPEC AEROBE CULT: NORMAL

## 2023-02-23 DIAGNOSIS — Z79.899 OTHER LONG TERM (CURRENT) DRUG THERAPY: ICD-10-CM

## 2023-02-23 DIAGNOSIS — F41.3 OTHER MIXED ANXIETY DISORDERS: ICD-10-CM

## 2023-02-23 RX ORDER — LORAZEPAM 0.5 MG/1
TABLET ORAL
Qty: 40 TABLET | Refills: 1 | Status: SHIPPED | OUTPATIENT
Start: 2023-02-23 | End: 2023-03-23 | Stop reason: SDUPTHER

## 2023-03-16 DIAGNOSIS — R30.0 DYSURIA: ICD-10-CM

## 2023-03-16 RX ORDER — CEPHALEXIN 500 MG/1
CAPSULE ORAL
Qty: 21 CAPSULE | Refills: 0 | OUTPATIENT
Start: 2023-03-16

## 2023-03-23 ENCOUNTER — LAB (OUTPATIENT)
Dept: LAB | Facility: HOSPITAL | Age: 78
End: 2023-03-23
Payer: MEDICARE

## 2023-03-23 ENCOUNTER — OFFICE VISIT (OUTPATIENT)
Dept: FAMILY MEDICINE CLINIC | Age: 78
End: 2023-03-23
Payer: MEDICARE

## 2023-03-23 VITALS
WEIGHT: 143.4 LBS | SYSTOLIC BLOOD PRESSURE: 137 MMHG | HEIGHT: 64 IN | TEMPERATURE: 98.4 F | HEART RATE: 70 BPM | BODY MASS INDEX: 24.48 KG/M2 | DIASTOLIC BLOOD PRESSURE: 85 MMHG

## 2023-03-23 DIAGNOSIS — R73.9 HYPERGLYCEMIA: ICD-10-CM

## 2023-03-23 DIAGNOSIS — F41.3 OTHER MIXED ANXIETY DISORDERS: ICD-10-CM

## 2023-03-23 DIAGNOSIS — E78.2 MIXED HYPERLIPIDEMIA: ICD-10-CM

## 2023-03-23 DIAGNOSIS — E53.8 FOLIC ACID DEFICIENCY: ICD-10-CM

## 2023-03-23 DIAGNOSIS — I10 ESSENTIAL (PRIMARY) HYPERTENSION: ICD-10-CM

## 2023-03-23 DIAGNOSIS — Z79.899 OTHER LONG TERM (CURRENT) DRUG THERAPY: ICD-10-CM

## 2023-03-23 DIAGNOSIS — D64.9 ANEMIA, UNSPECIFIED TYPE: ICD-10-CM

## 2023-03-23 DIAGNOSIS — I10 ESSENTIAL (PRIMARY) HYPERTENSION: Primary | ICD-10-CM

## 2023-03-23 DIAGNOSIS — Z23 ENCOUNTER FOR IMMUNIZATION: ICD-10-CM

## 2023-03-23 LAB
ALBUMIN SERPL-MCNC: 4.5 G/DL (ref 3.5–5.2)
ALBUMIN/GLOB SERPL: 1.4 G/DL
ALP SERPL-CCNC: 107 U/L (ref 39–117)
ALT SERPL W P-5'-P-CCNC: 12 U/L (ref 1–33)
ANION GAP SERPL CALCULATED.3IONS-SCNC: 10 MMOL/L (ref 5–15)
AST SERPL-CCNC: 18 U/L (ref 1–32)
BILIRUB SERPL-MCNC: 0.3 MG/DL (ref 0–1.2)
BUN SERPL-MCNC: 16 MG/DL (ref 8–23)
BUN/CREAT SERPL: 12.2 (ref 7–25)
CALCIUM SPEC-SCNC: 9.7 MG/DL (ref 8.6–10.5)
CHLORIDE SERPL-SCNC: 103 MMOL/L (ref 98–107)
CHOLEST SERPL-MCNC: 197 MG/DL (ref 0–200)
CO2 SERPL-SCNC: 25 MMOL/L (ref 22–29)
CREAT SERPL-MCNC: 1.31 MG/DL (ref 0.57–1)
DEPRECATED RDW RBC AUTO: 40.5 FL (ref 37–54)
EGFRCR SERPLBLD CKD-EPI 2021: 42.6 ML/MIN/1.73
ERYTHROCYTE [DISTWIDTH] IN BLOOD BY AUTOMATED COUNT: 12.1 % (ref 12.3–15.4)
FOLATE SERPL-MCNC: >20 NG/ML (ref 4.78–24.2)
GLOBULIN UR ELPH-MCNC: 3.3 GM/DL
GLUCOSE SERPL-MCNC: 86 MG/DL (ref 65–99)
HBA1C MFR BLD: 5.3 % (ref 4.8–5.6)
HCT VFR BLD AUTO: 37.1 % (ref 34–46.6)
HDLC SERPL-MCNC: 55 MG/DL (ref 40–60)
HGB BLD-MCNC: 12.7 G/DL (ref 12–15.9)
LDLC SERPL CALC-MCNC: 119 MG/DL (ref 0–100)
LDLC/HDLC SERPL: 2.12 {RATIO}
MCH RBC QN AUTO: 30.9 PG (ref 26.6–33)
MCHC RBC AUTO-ENTMCNC: 34.2 G/DL (ref 31.5–35.7)
MCV RBC AUTO: 90.3 FL (ref 79–97)
PLATELET # BLD AUTO: 331 10*3/MM3 (ref 140–450)
PMV BLD AUTO: 9.4 FL (ref 6–12)
POTASSIUM SERPL-SCNC: 3.8 MMOL/L (ref 3.5–5.2)
PROT SERPL-MCNC: 7.8 G/DL (ref 6–8.5)
RBC # BLD AUTO: 4.11 10*6/MM3 (ref 3.77–5.28)
SODIUM SERPL-SCNC: 138 MMOL/L (ref 136–145)
TRIGL SERPL-MCNC: 127 MG/DL (ref 0–150)
TSH SERPL DL<=0.05 MIU/L-ACNC: 3.16 UIU/ML (ref 0.27–4.2)
VIT B12 BLD-MCNC: 418 PG/ML (ref 211–946)
VLDLC SERPL-MCNC: 23 MG/DL (ref 5–40)
WBC NRBC COR # BLD: 5.66 10*3/MM3 (ref 3.4–10.8)

## 2023-03-23 PROCEDURE — 83036 HEMOGLOBIN GLYCOSYLATED A1C: CPT

## 2023-03-23 PROCEDURE — 36415 COLL VENOUS BLD VENIPUNCTURE: CPT

## 2023-03-23 PROCEDURE — 80053 COMPREHEN METABOLIC PANEL: CPT

## 2023-03-23 PROCEDURE — 85027 COMPLETE CBC AUTOMATED: CPT

## 2023-03-23 PROCEDURE — 80061 LIPID PANEL: CPT

## 2023-03-23 PROCEDURE — 84443 ASSAY THYROID STIM HORMONE: CPT

## 2023-03-23 PROCEDURE — 82746 ASSAY OF FOLIC ACID SERUM: CPT

## 2023-03-23 PROCEDURE — 82607 VITAMIN B-12: CPT

## 2023-03-23 RX ORDER — LOSARTAN POTASSIUM 50 MG/1
50 TABLET ORAL DAILY
Qty: 90 TABLET | Refills: 3 | Status: SHIPPED | OUTPATIENT
Start: 2023-03-23

## 2023-03-23 RX ORDER — LORAZEPAM 0.5 MG/1
.25-.5 TABLET ORAL 2 TIMES DAILY PRN
Qty: 40 TABLET | Refills: 1 | Status: SHIPPED | OUTPATIENT
Start: 2023-03-23

## 2023-03-23 RX ORDER — SIMVASTATIN 20 MG
20 TABLET ORAL NIGHTLY
Qty: 90 TABLET | Refills: 3 | Status: SHIPPED | OUTPATIENT
Start: 2023-03-23

## 2023-03-23 RX ORDER — SERTRALINE HYDROCHLORIDE 100 MG/1
100 TABLET, FILM COATED ORAL DAILY
Qty: 90 TABLET | Refills: 3 | Status: SHIPPED | OUTPATIENT
Start: 2023-03-23

## 2023-03-23 RX ORDER — FOLIC ACID 1 MG/1
1 TABLET ORAL DAILY
Qty: 90 TABLET | Refills: 3 | Status: SHIPPED | OUTPATIENT
Start: 2023-03-23

## 2023-03-23 NOTE — PROGRESS NOTES
Theresa Davis presents to [facility].    Chief Complaint:  Blood pressure, anxiety, follow up    Subjective       History of Present Illness:  Theresa is in today for follow up on her care.  She has hypertension for which she remains on losartan.    She does not routinely check her blood pressure at this time.  Her pressure is mildly elevated here.  It remains an open question whether we should consider increasing the dose of losartan.     She also has chronic anxiety for which she takes lorazepam currently.  She continues to have significant stressors related to her family and does not feel that we could make any change at this time.  She denies any intention to harm herself.  She also remains on sertraline and tolerates it.  Risks are discussed with her again today.  Issac is okay.      She also has elevated cholesterol for which she remains on statin therapy.      Review of Systems:  Review of Systems   Constitutional: Negative for chills and fever.   Respiratory: Negative for cough and shortness of breath.    Cardiovascular: Negative for chest pain and palpitations.   Gastrointestinal: Negative for abdominal pain, nausea and vomiting.        Objective   Medical History:  Past Medical History:   • Anemia, unspecified   • Cervicalgia   • Essential (primary) hypertension   • Mixed hyperlipidemia   • Other long term (current) drug therapy   • Other mixed anxiety disorders     Past Surgical History:   • APPENDECTOMY   • CARPAL TUNNEL RELEASE   • COLONOSCOPY    Normal   • HYSTERECTOMY    PARTIAL   • THORACIC OUTLET SURGERY      Family History   Problem Relation Age of Onset   • Breast cancer Mother    • Coronary artery disease Father      Social History     Tobacco Use   • Smoking status: Never   • Smokeless tobacco: Never   Substance Use Topics   • Alcohol use: Never       Health Maintenance Due   Topic Date Due   • TDAP/TD VACCINES (1 - Tdap) Never done   • ZOSTER VACCINE (1 of 2) Never  "done   • COVID-19 Vaccine (4 - Booster for Pfizer series) 02/17/2022   • LIPID PANEL  11/15/2022        Immunization History   Administered Date(s) Administered   • COVID-19 (PFIZER) PURPLE CAP 03/09/2021, 03/30/2021, 12/23/2021   • Fluzone High Dose =>65 Years ([employer] ONLY) 10/21/2012, 10/10/2013, 10/02/2017   • Fluzone High-Dose 65+yrs 01/12/2022, 09/16/2022   • Influenza, Unspecified 09/23/2020   • Pneumococcal Conjugate 13-Valent (PCV13) 08/12/2015   • Pneumococcal Polysaccharide (PPSV23) 08/17/2012, 08/17/2012       Allergies   Allergen Reactions   • Penicillins Rash and GI Intolerance   • Sulfa Antibiotics Unknown - Low Severity        Medications:  Current Outpatient Medications on File Prior to Visit   Medication Sig   • AZO-CRANBERRY PO Take  by mouth.   • denosumab (PROLIA) 60 MG/ML solution prefilled syringe syringe Inject 1 mL under the skin into the appropriate area as directed Every 6 (Six) Months. T score -2.5, Dx M81.0   • Nutritional Supplements (QUINOA KALE & HEMP PO) As Needed.   • [DISCONTINUED] cephalexin (KEFLEX) 500 MG capsule Take 1 capsule by mouth 2 (Two) Times a Day.   • [DISCONTINUED] folic acid (FOLVITE) 1 MG tablet Take 1 tablet by mouth Daily.   • [DISCONTINUED] LORazepam (ATIVAN) 0.5 MG tablet Take 1/2 to 1 tablet by mouth BY MOUTH TWICE DAILY AS NEEDED FOR anxiety   • [DISCONTINUED] losartan (COZAAR) 25 MG tablet Take 1 tablet by mouth Daily.   • [DISCONTINUED] sertraline (ZOLOFT) 100 MG tablet Take 1 tablet by mouth Daily.   • [DISCONTINUED] simvastatin (ZOCOR) 20 MG tablet Take 1 tablet by mouth Every Night.     No current facility-administered medications on file prior to visit.       Vital Signs:   Vitals:    03/23/23 0837 03/23/23 0901   BP: 152/84 137/85   BP Location: Right arm Right arm   Patient Position: Sitting Sitting   Pulse: 76 70   Temp: 98.4 °F (36.9 °C)    TempSrc: Oral    Weight: 65 kg (143 lb 6.4 oz)    Height: 162.6 cm (64.02\")    Body mass index is 24.6 " kg/m².    Physical Exam:  Physical Exam  Vitals reviewed.   Constitutional:       General: She is not in acute distress.     Appearance: She is not ill-appearing.   Eyes:      Pupils: Pupils are equal, round, and reactive to light.   Neck:      Comments: No thyromegaly  Cardiovascular:      Rate and Rhythm: Normal rate and regular rhythm.   Pulmonary:      Effort: Pulmonary effort is normal.      Breath sounds: Normal breath sounds.   Abdominal:      General: There is no distension.      Palpations: Abdomen is soft.      Tenderness: There is no abdominal tenderness.   Musculoskeletal:      Cervical back: Neck supple.   Lymphadenopathy:      Cervical: No cervical adenopathy.   Skin:     Findings: No lesion or rash.   Neurological:      General: No focal deficit present.      Mental Status: She is alert.      Cranial Nerves: No cranial nerve deficit.      Motor: No weakness.   Psychiatric:         Mood and Affect: Mood normal.         Behavior: Behavior normal.         Result Review      The following data was reviewed by Waylon Tang MD on 03/23/2023.  Lab Results   Component Value Date    WBC 6.70 09/16/2022    HGB 11.7 (L) 09/16/2022    HCT 36.4 09/16/2022    MCV 88.1 09/16/2022     09/16/2022     Lab Results   Component Value Date    GLUCOSE 88 09/16/2022    BUN 18 09/16/2022    CREATININE 1.07 (H) 09/16/2022     (L) 09/16/2022    K 4.0 09/16/2022     09/16/2022    CO2 24.5 09/16/2022    CALCIUM 10.0 09/16/2022    PROTEINTOT 7.4 06/30/2022    ALBUMIN 4.10 06/30/2022    ALT 16 06/30/2022    AST 18 06/30/2022    ALKPHOS 144 (H) 06/30/2022    BILITOT 0.2 06/30/2022    EGFR 54.3 (L) 09/16/2022    GLOB 3.3 06/30/2022    AGRATIO 1.2 06/30/2022    BCR 16.8 09/16/2022    ANIONGAP 7.5 09/16/2022      Lab Results   Component Value Date    CHOL 178 11/15/2021    CHLPL 190 10/22/2020    TRIG 78 11/15/2021    HDL 65 (H) 11/15/2021    LDL 99 11/15/2021     Lab Results   Component Value Date    TSH  1.980 06/30/2022     Lab Results   Component Value Date    HGBA1C 5.4 05/20/2022            Assessment and Plan:   Today, we have reviewed her care.  Theresa seems well today overall.  Her blood pressure has been elevated more than not over the last year.  We will bump the losartan up to 50 mg daily at this time.  Regarding her anxiety and depression, they continue to be significant for her.  We will continue her current medications.  We had some discussion about whether to refer to psychiatry, but she does not want to do that right now.  We will continue to keep close.  Otherwise, we will plan to see her back in about 6 months for recheck.  COVID-19 booster will be given today.  We will also update labs as noted.  She may want to check with her pharmacy about the shingles vaccine.       Diagnoses and all orders for this visit:    1. Essential (primary) hypertension (Primary)  -     Comprehensive Metabolic Panel; Future  -     losartan (COZAAR) 50 MG tablet; Take 1 tablet by mouth Daily.  Dispense: 90 tablet; Refill: 3    2. Mixed hyperlipidemia  -     Comprehensive Metabolic Panel; Future  -     Lipid Panel; Future  -     TSH; Future  -     simvastatin (ZOCOR) 20 MG tablet; Take 1 tablet by mouth Every Night.  Dispense: 90 tablet; Refill: 3    3. Other mixed anxiety disorders  -     LORazepam (ATIVAN) 0.5 MG tablet; Take 0.5-1 tablets by mouth 2 (Two) Times a Day As Needed for Anxiety.  Dispense: 40 tablet; Refill: 1  -     sertraline (ZOLOFT) 100 MG tablet; Take 1 tablet by mouth Daily.  Dispense: 90 tablet; Refill: 3    4. Other long term (current) drug therapy  -     CBC (No Diff); Future  -     LORazepam (ATIVAN) 0.5 MG tablet; Take 0.5-1 tablets by mouth 2 (Two) Times a Day As Needed for Anxiety.  Dispense: 40 tablet; Refill: 1    5. Anemia, unspecified type  -     CBC (No Diff); Future    6. Folic acid deficiency  -     Vitamin B12 & Folate; Future  -     folic acid (FOLVITE) 1 MG tablet; Take 1 tablet by mouth  Daily.  Dispense: 90 tablet; Refill: 3    7. Hyperglycemia  -     Hemoglobin A1c; Future    8. Encounter for immunization  -     COVID-19 Bivalent Booster (Pfizer) 12+yrs    Follow Up   Return in about 6 months (around 9/23/2023) for Recheck, Medicare Wellness.  Patient was given instructions and counseling regarding her condition or for health maintenance advice. Please see specific information pulled into the AVS if appropriate.

## 2023-04-24 ENCOUNTER — TELEPHONE (OUTPATIENT)
Dept: FAMILY MEDICINE CLINIC | Age: 78
End: 2023-04-24
Payer: MEDICARE

## 2023-04-24 DIAGNOSIS — R79.89 ELEVATED SERUM CREATININE: ICD-10-CM

## 2023-04-24 DIAGNOSIS — N18.32 STAGE 3B CHRONIC KIDNEY DISEASE: ICD-10-CM

## 2023-04-24 DIAGNOSIS — I10 ESSENTIAL (PRIMARY) HYPERTENSION: Primary | ICD-10-CM

## 2023-04-24 NOTE — TELEPHONE ENCOUNTER
----- Message from Carmencita Cochran LPN sent at 3/27/2023 12:16 PM EDT -----  TICKLE for BMP and urinalysis with micro in 4 weeks for hypertension and elevated creatinine.  She should have plenty to drink on the day she comes for repeat testing.

## 2023-04-25 ENCOUNTER — LAB (OUTPATIENT)
Dept: LAB | Facility: HOSPITAL | Age: 78
End: 2023-04-25
Payer: MEDICARE

## 2023-04-25 DIAGNOSIS — I10 ESSENTIAL (PRIMARY) HYPERTENSION: ICD-10-CM

## 2023-04-25 DIAGNOSIS — R79.89 ELEVATED SERUM CREATININE: ICD-10-CM

## 2023-04-25 LAB
ANION GAP SERPL CALCULATED.3IONS-SCNC: 10 MMOL/L (ref 5–15)
BACTERIA UR QL AUTO: ABNORMAL /HPF
BILIRUB UR QL STRIP: NEGATIVE
BUN SERPL-MCNC: 25 MG/DL (ref 8–23)
BUN/CREAT SERPL: 17 (ref 7–25)
CALCIUM SPEC-SCNC: 9.2 MG/DL (ref 8.6–10.5)
CHLORIDE SERPL-SCNC: 105 MMOL/L (ref 98–107)
CLARITY UR: ABNORMAL
CO2 SERPL-SCNC: 25 MMOL/L (ref 22–29)
COLOR UR: YELLOW
CREAT SERPL-MCNC: 1.47 MG/DL (ref 0.57–1)
EGFRCR SERPLBLD CKD-EPI 2021: 37.1 ML/MIN/1.73
GLUCOSE SERPL-MCNC: 101 MG/DL (ref 65–99)
GLUCOSE UR STRIP-MCNC: NEGATIVE MG/DL
HGB UR QL STRIP.AUTO: ABNORMAL
KETONES UR QL STRIP: NEGATIVE
LEUKOCYTE ESTERASE UR QL STRIP.AUTO: ABNORMAL
MUCOUS THREADS URNS QL MICRO: ABNORMAL /HPF
NITRITE UR QL STRIP: NEGATIVE
PH UR STRIP.AUTO: 5.5 [PH] (ref 5–8)
POTASSIUM SERPL-SCNC: 4.3 MMOL/L (ref 3.5–5.2)
PROT UR QL STRIP: ABNORMAL
RBC # UR STRIP: ABNORMAL /HPF
REF LAB TEST METHOD: ABNORMAL
SODIUM SERPL-SCNC: 140 MMOL/L (ref 136–145)
SP GR UR STRIP: >=1.03 (ref 1–1.03)
SQUAMOUS #/AREA URNS HPF: ABNORMAL /HPF
UROBILINOGEN UR QL STRIP: ABNORMAL
WBC # UR STRIP: ABNORMAL /HPF

## 2023-04-25 PROCEDURE — 36415 COLL VENOUS BLD VENIPUNCTURE: CPT

## 2023-04-25 PROCEDURE — 80048 BASIC METABOLIC PNL TOTAL CA: CPT

## 2023-04-25 PROCEDURE — 81001 URINALYSIS AUTO W/SCOPE: CPT

## 2023-04-26 RX ORDER — NIFEDIPINE 30 MG/1
30 TABLET, FILM COATED, EXTENDED RELEASE ORAL NIGHTLY
Qty: 90 TABLET | Refills: 3 | Status: SHIPPED | OUTPATIENT
Start: 2023-04-26

## 2023-06-12 DIAGNOSIS — Z79.899 OTHER LONG TERM (CURRENT) DRUG THERAPY: ICD-10-CM

## 2023-06-12 DIAGNOSIS — F41.3 OTHER MIXED ANXIETY DISORDERS: ICD-10-CM

## 2023-06-12 RX ORDER — LORAZEPAM 0.5 MG/1
.25-.5 TABLET ORAL 2 TIMES DAILY PRN
Qty: 40 TABLET | Refills: 1 | Status: SHIPPED | OUTPATIENT
Start: 2023-06-12

## 2023-06-28 ENCOUNTER — TELEPHONE (OUTPATIENT)
Dept: FAMILY MEDICINE CLINIC | Age: 78
End: 2023-06-28

## 2023-06-28 NOTE — TELEPHONE ENCOUNTER
Caller: Theresa Davis    Relationship: Self    Best call back number: [phone]     Who are you requesting to speak with (clinical staff, provider,  specific staff member): BECKY    What was the call regarding: PATIENT WOULD LIKE TO SPEAK WITH BECKY REGARDING HER HIP PAIN. AFTER HER LEG SURGERY OVER A YEAR AGO, SHE HAS HAD TROUBLE BENDING. PLEASE CALL TO ADVISE.

## 2023-07-24 ENCOUNTER — TELEPHONE (OUTPATIENT)
Dept: FAMILY MEDICINE CLINIC | Age: 78
End: 2023-07-24
Payer: MEDICARE

## 2023-07-24 NOTE — TELEPHONE ENCOUNTER
Caller: RYLEY VAZQUEZ    Relationship: Emergency Contact    Best call back number: [phone]    What is the best time to reach you: ANYTIME    Who are you requesting to speak with (clinical staff, provider,  specific staff member): CLINICAL      What was the call regarding: THE PATIENT'S  STATED SHE HAD SEEN A DOCTOR REGARDING HER BACK AND HIP PAIN. THEY HAVE BEEN WAITING ON A REFERRAL AND WOULD LIKE A CALL BACK TO DISCUSS

## 2023-08-14 DIAGNOSIS — Z79.899 OTHER LONG TERM (CURRENT) DRUG THERAPY: ICD-10-CM

## 2023-08-14 DIAGNOSIS — F41.3 OTHER MIXED ANXIETY DISORDERS: ICD-10-CM

## 2023-08-14 RX ORDER — LORAZEPAM 0.5 MG/1
.25-.5 TABLET ORAL 2 TIMES DAILY PRN
Qty: 40 TABLET | Refills: 1 | Status: SHIPPED | OUTPATIENT
Start: 2023-08-14

## 2023-08-29 ENCOUNTER — OFFICE VISIT (OUTPATIENT)
Dept: ORTHOPEDIC SURGERY | Facility: CLINIC | Age: 78
End: 2023-08-29
Payer: MEDICARE

## 2023-08-29 VITALS — WEIGHT: 145 LBS | HEART RATE: 88 BPM | OXYGEN SATURATION: 98 % | BODY MASS INDEX: 24.75 KG/M2 | HEIGHT: 64 IN

## 2023-08-29 DIAGNOSIS — M70.62 TROCHANTERIC BURSITIS OF LEFT HIP: ICD-10-CM

## 2023-08-29 DIAGNOSIS — M70.71 BURSITIS OF OTHER BURSA OF RIGHT HIP: Primary | ICD-10-CM

## 2023-08-29 RX ADMIN — TRIAMCINOLONE ACETONIDE 40 MG: 40 INJECTION, SUSPENSION INTRA-ARTICULAR; INTRAMUSCULAR at 15:11

## 2023-08-29 RX ADMIN — LIDOCAINE HYDROCHLORIDE 5 ML: 10 INJECTION, SOLUTION INFILTRATION; PERINEURAL at 15:11

## 2023-08-29 NOTE — PROGRESS NOTES
"Chief Complaint  Initial Evaluation of the Right Hip and Initial Evaluation of the Left Hip     Subjective      Theresa Davis presents to [facility] for evaluation of the bilateral hips. She reports 1 1/2 year ago she fell and injured her left lower extremity. She reports she had rods placed for her fracture. She reports pain since surgery. She reports right worse than the left. She reports pain when laying on her right side.     Allergies   Allergen Reactions    Penicillins Rash and GI Intolerance    Sulfa Antibiotics Unknown - Low Severity        Social History     Socioeconomic History    Marital status:     Number of children: 2   Tobacco Use    Smoking status: Never    Smokeless tobacco: Never   Vaping Use    Vaping Use: Never used   Substance and Sexual Activity    Alcohol use: Never    Drug use: Never        I reviewed the patient's chief complaint, history of present illness, review of systems, past medical history, surgical history, family history, social history, medications, and allergy list.     Review of Systems     Constitutional: Denies fevers, chills, weight loss  Cardiovascular: Denies chest pain, shortness of breath  Skin: Denies rashes, acute skin changes  Neurologic: Denies headache, loss of consciousness  MSK: bilateral hip pain      Vital Signs:   Pulse 88   Ht 162.6 cm (64\")   Wt 65.8 kg (145 lb)   SpO2 98%   BMI 24.89 kg/mý          Physical Exam  General: Alert. No acute distress    Ortho Exam      Right hip- Positive EHL, FHL, GS and TA. Sensation intact to all 5 nerves of the foot. Positive pulses. Tender to the lateral hip. Unsteady gait. Flexion 100. External Rotation 35. Internal rotation 20. Pain with straight leg raise     Left hip- mild tenderness to the lateral hip. Unsteady gait. Flexion 100. External Rotation 40. Internal rotation 20 negative straight leg raise Positive EHL, FHL, GS and TA. Sensation intact to all 5 nerves of the foot. " Positive pulses.     Right hip: R greater trochanteric bursa  Date/Time: 8/29/2023 3:11 PM  Consent given by: patient  Site marked: site marked  Timeout: Immediately prior to procedure a time out was called to verify the correct patient, procedure, equipment, support staff and site/side marked as required   Supporting Documentation  Indications: pain   Procedure Details  Location: hip - R greater trochanteric bursa  Needle size: 22 G  Medications administered: 5 mL lidocaine 1 %; 40 mg triamcinolone acetonide 40 MG/ML  Patient tolerance: patient tolerated the procedure well with no immediate complications        X-rays- Degenerative changes of both hips.  No acute osseous abnormality.  Postsurgical change.       Imaging Results (Most Recent)       None             Result Review :       No results found.           Assessment and Plan     Diagnoses and all orders for this visit:    1. Bursitis of other bursa of right hip (Primary)    2. Trochanteric bursitis of left hip        Discussed the treatment plan with the patient.  I reviewed the recent x-rays with the patient. Discussed the risks and benefits of a right hip bursa injection. The patient expressed understanding and wished to proceed. She tolerated the injections well. Home exercises given today.     Call or return if worsening symptoms.    Follow Up     6 weeks    Patient was given instructions and counseling regarding her condition or for health maintenance advice. Please see specific information pulled into the AVS if appropriate.     Scribed for Valente Acuna MD by Galina Glover.  08/29/23   14:25 EDT    I have personally performed the services described in this document as scribed by the above individual and it is both accurate and complete. Valente Acuna MD 09/01/23

## 2023-08-30 ENCOUNTER — TELEPHONE (OUTPATIENT)
Dept: FAMILY MEDICINE CLINIC | Age: 78
End: 2023-08-30
Payer: MEDICARE

## 2023-09-01 RX ORDER — LIDOCAINE HYDROCHLORIDE 10 MG/ML
5 INJECTION, SOLUTION INFILTRATION; PERINEURAL
Status: COMPLETED | OUTPATIENT
Start: 2023-08-29 | End: 2023-08-29

## 2023-09-01 RX ORDER — TRIAMCINOLONE ACETONIDE 40 MG/ML
40 INJECTION, SUSPENSION INTRA-ARTICULAR; INTRAMUSCULAR
Status: COMPLETED | OUTPATIENT
Start: 2023-08-29 | End: 2023-08-29

## 2023-09-12 ENCOUNTER — OFFICE VISIT (OUTPATIENT)
Dept: FAMILY MEDICINE CLINIC | Age: 78
End: 2023-09-12
Payer: MEDICARE

## 2023-09-12 ENCOUNTER — HOSPITAL ENCOUNTER (OUTPATIENT)
Dept: GENERAL RADIOLOGY | Facility: HOSPITAL | Age: 78
Discharge: HOME | End: 2023-09-12
Payer: MEDICARE

## 2023-09-12 ENCOUNTER — LAB (OUTPATIENT)
Dept: LAB | Facility: HOSPITAL | Age: 78
End: 2023-09-12
Payer: MEDICARE

## 2023-09-12 VITALS
BODY MASS INDEX: 24.21 KG/M2 | DIASTOLIC BLOOD PRESSURE: 47 MMHG | WEIGHT: 141.8 LBS | HEART RATE: 85 BPM | HEIGHT: 64 IN | SYSTOLIC BLOOD PRESSURE: 86 MMHG | TEMPERATURE: 98.2 F
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
## 2023-09-12 DIAGNOSIS — M25.561 ACUTE PAIN OF RIGHT KNEE: ICD-10-CM

## 2023-09-12 DIAGNOSIS — M79.671 RIGHT FOOT PAIN: ICD-10-CM

## 2023-09-12 DIAGNOSIS — Z79.899 OTHER LONG TERM (CURRENT) DRUG THERAPY: ICD-10-CM

## 2023-09-12 DIAGNOSIS — I10 ESSENTIAL (PRIMARY) HYPERTENSION: ICD-10-CM
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
## 2023-09-12 DIAGNOSIS — M25.571 ACUTE RIGHT ANKLE PAIN: ICD-10-CM

## 2023-09-12 DIAGNOSIS — E78.2 MIXED HYPERLIPIDEMIA: ICD-10-CM

## 2023-09-12 DIAGNOSIS — N18.32 STAGE 3B CHRONIC KIDNEY DISEASE: ICD-10-CM

## 2023-09-12 LAB
ALBUMIN SERPL-MCNC: 4.3 G/DL (ref 3.5–5.2)
ALBUMIN/GLOB SERPL: 1.3 G/DL
ALP SERPL-CCNC: 91 U/L (ref 39–117)
ALT SERPL W P-5'-P-CCNC: 11 U/L (ref 1–33)
ANION GAP SERPL CALCULATED.3IONS-SCNC: 11.5 MMOL/L (ref 5–15)
AST SERPL-CCNC: 14 U/L (ref 1–32)
BILIRUB SERPL-MCNC: 0.3 MG/DL (ref 0–1.2)
BUN SERPL-MCNC: 25 MG/DL (ref 8–23)
BUN/CREAT SERPL: 20 (ref 7–25)
CALCIUM SPEC-SCNC: 9.3 MG/DL (ref 8.6–10.5)
CHLORIDE SERPL-SCNC: 101 MMOL/L (ref 98–107)
CO2 SERPL-SCNC: 24.5 MMOL/L (ref 22–29)
CREAT SERPL-MCNC: 1.25 MG/DL (ref 0.57–1)
DEPRECATED RDW RBC AUTO: 41 FL (ref 37–54)
EGFRCR SERPLBLD CKD-EPI 2021: 44.8 ML/MIN/1.73
ERYTHROCYTE [DISTWIDTH] IN BLOOD BY AUTOMATED COUNT: 12 % (ref 12.3–15.4)
GLOBULIN UR ELPH-MCNC: 3.2 GM/DL
GLUCOSE SERPL-MCNC: 94 MG/DL (ref 65–99)
HCT VFR BLD AUTO: 33.5 % (ref 34–46.6)
HGB BLD-MCNC: 11.2 G/DL (ref 12–15.9)
MCH RBC QN AUTO: 30.6 PG (ref 26.6–33)
MCHC RBC AUTO-ENTMCNC: 33.4 G/DL (ref 31.5–35.7)
MCV RBC AUTO: 91.5 FL (ref 79–97)
PLATELET # BLD AUTO: 308 10*3/MM3 (ref 140–450)
PMV BLD AUTO: 8.6 FL (ref 6–12)
POTASSIUM SERPL-SCNC: 4 MMOL/L (ref 3.5–5.2)
PROT SERPL-MCNC: 7.5 G/DL (ref 6–8.5)
RBC # BLD AUTO: 3.66 10*6/MM3 (ref 3.77–5.28)
SODIUM SERPL-SCNC: 137 MMOL/L (ref 136–145)
WBC NRBC COR # BLD: 6.99 10*3/MM3 (ref 3.4–10.8)

## 2023-09-12 PROCEDURE — 80053 COMPREHEN METABOLIC PANEL: CPT

## 2023-09-12 PROCEDURE — 73610 X-RAY EXAM OF ANKLE: CPT

## 2023-09-12 PROCEDURE — 73562 X-RAY EXAM OF KNEE 3: CPT

## 2023-09-12 PROCEDURE — 36415 COLL VENOUS BLD VENIPUNCTURE: CPT

## 2023-09-12 PROCEDURE — 73630 X-RAY EXAM OF FOOT: CPT

## 2023-09-12 PROCEDURE — 85027 COMPLETE CBC AUTOMATED: CPT

## 2023-09-12 RX ORDER — LOSARTAN POTASSIUM 50 MG/1
1 TABLET ORAL DAILY
COMMUNITY
Start: 2023-08-08 | End: 2023-09-12

## 2023-09-12 NOTE — PROGRESS NOTES
Theresa Davis presents to [facility].    Chief Complaint:  Knee and ankle pain, blood pressure    Subjective   History of Present Illness:  Theresa is being seen today for follow up on her care.  She fell recently and hurt her right ankle and knee.  She is here in part to have these looked at.  This happened about 2 days ago.  She has pain with ambulation currently.    Also, she has hypertension and brings in 3 different doses of losartan.  She is currently taking all 3 of these.  She is also taking nifedipine for hypertension.    Review of Systems:  Review of Systems   Constitutional:  Negative for chills and fever.   Respiratory:  Positive for shortness of breath (get short winded at times). Negative for cough.    Cardiovascular:  Negative for chest pain and palpitations.   Gastrointestinal:  Negative for abdominal pain, nausea and vomiting.      Objective   Medical History:  Past Medical History:    Anemia, unspecified    Cervicalgia    Essential (primary) hypertension    Mixed hyperlipidemia    Other long term (current) drug therapy    Other mixed anxiety disorders     Past Surgical History:    APPENDECTOMY    CARPAL TUNNEL RELEASE    COLONOSCOPY    Normal    HYSTERECTOMY    PARTIAL    THORACIC OUTLET SURGERY      Family History   Problem Relation Age of Onset    Breast cancer Mother     Coronary artery disease Father      Social History     Tobacco Use    Smoking status: Never    Smokeless tobacco: Never   Substance Use Topics    Alcohol use: Never       Health Maintenance Due   Topic Date Due    TDAP/TD VACCINES (1 - Tdap) Never done    ZOSTER VACCINE (1 of 2) Never done    COVID-19 Vaccine (5 - Pfizer series) 07/23/2023    ANNUAL WELLNESS VISIT  08/12/2023        Immunization History   Administered Date(s) Administered    COVID-19 (PFIZER) BIVALENT 12+YRS 03/23/2023    COVID-19 (PFIZER) Purple Cap Monovalent 03/09/2021, 03/30/2021, 12/23/2021    Fluzone High Dose =>65 Years  "([facility] ONLY) 10/21/2012, 10/10/2013, 10/02/2017    Fluzone High-Dose 65+yrs 01/12/2022, 09/16/2022    Influenza, Unspecified 09/23/2020    Pneumococcal Conjugate 13-Valent (PCV13) 08/12/2015    Pneumococcal Polysaccharide (PPSV23) 08/17/2012, 08/17/2012       Allergies   Allergen Reactions    Penicillins Rash and GI Intolerance    Sulfa Antibiotics Unknown - Low Severity        Medications:  Current Outpatient Medications on File Prior to Visit   Medication Sig    [DISCONTINUED] losartan (COZAAR) 50 MG tablet Take 1 tablet by mouth Daily.    denosumab (PROLIA) 60 MG/ML solution prefilled syringe syringe Inject 1 mL under the skin into the appropriate area as directed Every 6 (Six) Months. T score -2.5, Dx M81.0    folic acid (FOLVITE) 1 MG tablet Take 1 tablet by mouth Daily.    LORazepam (ATIVAN) 0.5 MG tablet Take 0.5-1 tablets by mouth 2 (Two) Times a Day As Needed for Anxiety.    losartan-hydrochlorothiazide (HYZAAR) 100-25 MG per tablet Take 1 tablet by mouth Daily.    NIFEdipine CC (ADALAT CC) 30 MG 24 hr tablet Take 1 tablet by mouth Every Night.    Nutritional Supplements (QUINOA KALE & HEMP PO) As Needed.    sertraline (ZOLOFT) 100 MG tablet Take 1 tablet by mouth Daily.    simvastatin (ZOCOR) 20 MG tablet Take 1 tablet by mouth Every Night.    [DISCONTINUED] AZO-CRANBERRY PO Take  by mouth. (Patient not taking: Reported on 6/30/2023)     No current facility-administered medications on file prior to visit.       Vital Signs:   BP (!) 86/47 (BP Location: Right arm, Patient Position: Sitting)   Pulse 85   Temp 98.2 °F (36.8 °C) (Oral)   Ht 162.6 cm (64.02\")   Wt 64.3 kg (141 lb 12.8 oz)   BMI 24.33 kg/m²       Physical Exam:  Physical Exam  Vitals reviewed.   Constitutional:       General: She is not in acute distress.     Appearance: She is not ill-appearing.   Eyes:      Pupils: Pupils are equal, round, and reactive to light.   Neck:      Comments: No thyromegaly  Cardiovascular:      Rate and " Rhythm: Normal rate and regular rhythm.   Pulmonary:      Effort: Pulmonary effort is normal.      Breath sounds: Normal breath sounds.   Abdominal:      General: There is no distension.      Palpations: Abdomen is soft.      Tenderness: There is no abdominal tenderness.   Musculoskeletal:         General: Swelling and tenderness (There is some pain with range of motion of both the right ankle and knee.  Some bruising of the right lateral foot is present proximally.  Swelling is also noted.) present.      Cervical back: Neck supple.   Lymphadenopathy:      Cervical: No cervical adenopathy.   Skin:     Findings: No lesion or rash.   Neurological:      Mental Status: She is alert.       Result Review   The following data was reviewed by Waylon Tang MD on 09/12/2023.  Lab Results   Component Value Date    WBC 5.66 03/23/2023    HGB 12.7 03/23/2023    HCT 37.1 03/23/2023    MCV 90.3 03/23/2023     03/23/2023     Lab Results   Component Value Date    GLUCOSE 101 (H) 04/25/2023    BUN 25 (H) 04/25/2023    CREATININE 1.47 (H) 04/25/2023     04/25/2023    K 4.3 04/25/2023     04/25/2023    CO2 25.0 04/25/2023    CALCIUM 9.2 04/25/2023    PROTEINTOT 7.8 03/23/2023    ALBUMIN 4.5 03/23/2023    ALT 12 03/23/2023    AST 18 03/23/2023    ALKPHOS 107 03/23/2023    BILITOT 0.3 03/23/2023    EGFR 37.1 (L) 04/25/2023    GLOB 3.3 03/23/2023    AGRATIO 1.4 03/23/2023    BCR 17.0 04/25/2023    ANIONGAP 10.0 04/25/2023      Lab Results   Component Value Date    CHOL 197 03/23/2023    CHLPL 190 10/22/2020    TRIG 127 03/23/2023    HDL 55 03/23/2023     (H) 03/23/2023     Lab Results   Component Value Date    TSH 3.160 03/23/2023     Lab Results   Component Value Date    HGBA1C 5.30 03/23/2023     BMI is within normal parameters. No other follow-up for BMI required.         Assessment and Plan:   Today, we have reviewed her care.  She is having pain in the knee, foot, and ankle related to the fall.  We  will move ahead with x-ray evaluation as noted below as a precaution.  Also, it is unclear what led to it, but she has been taking multiple doses of losartan.  We have clarified what she should be taking and we will update some blood work as noted.  Her blood pressure is just below normal here.  I have encouraged her to be cautious the rest of the day regarding this.  We will see what her testing shows and reach back out to her tomorrow.    Diagnoses and all orders for this visit:    1. Fall, initial encounter (Primary)  -     XR Knee 3 View Right; Future  -     XR Ankle 3+ View Right; Future  -     XR Foot 3+ View Right; Future    2. Acute pain of right knee  -     XR Knee 3 View Right; Future    3. Acute right ankle pain  -     XR Ankle 3+ View Right; Future    4. Right foot pain  -     XR Foot 3+ View Right; Future    5. Essential (primary) hypertension  -     Comprehensive Metabolic Panel; Future    6. Mixed hyperlipidemia  -     Comprehensive Metabolic Panel; Future    7. Other long term (current) drug therapy  -     CBC (No Diff); Future    8. Stage 3b chronic kidney disease  -     CBC (No Diff); Future  -     Comprehensive Metabolic Panel; Future    Follow Up   Return in about 6 weeks (around 10/27/2023) for Recheck as scheduled.  Patient was given instructions and counseling regarding her condition or for health maintenance advice. Please see specific information pulled into the AVS if appropriate.

## 2023-09-13 DIAGNOSIS — M79.671 RIGHT FOOT PAIN: Primary | ICD-10-CM

## 2023-09-13 PROBLEM — Z00.00 MEDICARE ANNUAL WELLNESS VISIT, SUBSEQUENT: Status: RESOLVED | Noted: 2022-08-12 | Resolved: 2023-09-13

## 2023-09-13 PROBLEM — Z23 IMMUNIZATION DUE: Status: RESOLVED | Noted: 2022-08-12 | Resolved: 2023-09-13

## 2023-09-20 ENCOUNTER — TELEPHONE (OUTPATIENT)
Dept: FAMILY MEDICINE CLINIC | Age: 78
End: 2023-09-20
Payer: MEDICARE

## 2023-09-20 NOTE — TELEPHONE ENCOUNTER
----- Message from Carmencita Cochran LPN sent at 9/13/2023  9:10 AM EDT -----  TICKLE for blood pressure check in 1 week given the low reading yesterday.

## 2023-10-05 DIAGNOSIS — Z79.899 OTHER LONG TERM (CURRENT) DRUG THERAPY: ICD-10-CM

## 2023-10-05 DIAGNOSIS — F41.3 OTHER MIXED ANXIETY DISORDERS: ICD-10-CM

## 2023-10-05 RX ORDER — LORAZEPAM 0.5 MG/1
TABLET ORAL
Qty: 40 TABLET | Refills: 1 | Status: SHIPPED | OUTPATIENT
Start: 2023-10-05

## 2023-10-12 ENCOUNTER — TELEPHONE (OUTPATIENT)
Dept: FAMILY MEDICINE CLINIC | Age: 78
End: 2023-10-12
Payer: MEDICARE

## 2023-10-12 DIAGNOSIS — D64.9 ANEMIA, UNSPECIFIED TYPE: ICD-10-CM

## 2023-10-12 DIAGNOSIS — I10 ESSENTIAL (PRIMARY) HYPERTENSION: ICD-10-CM

## 2023-10-12 DIAGNOSIS — R79.89 ELEVATED SERUM CREATININE: Primary | ICD-10-CM

## 2023-10-12 NOTE — TELEPHONE ENCOUNTER
----- Message from Carmencita Cochran LPN sent at 9/13/2023  9:11 AM EDT -----  ÿTICKLE for BMP, CBC auto differential, B12, folic acid, ferritin, iron profile in 4 weeks for elevated creatinine, essential hypertension, anemia unspecified.

## 2023-11-02 PROBLEM — M70.70 BURSITIS OF HIP: Status: ACTIVE | Noted: 2023-11-02

## 2023-11-15 DIAGNOSIS — I10 ESSENTIAL (PRIMARY) HYPERTENSION: ICD-10-CM

## 2023-11-16 ENCOUNTER — OFFICE VISIT (OUTPATIENT)
Dept: PODIATRY | Facility: CLINIC | Age: 78
End: 2023-11-16
Payer: MEDICARE

## 2023-11-16 ENCOUNTER — TELEPHONE (OUTPATIENT)
Dept: FAMILY MEDICINE CLINIC | Age: 78
End: 2023-11-16
Payer: MEDICARE

## 2023-11-16 VITALS
BODY MASS INDEX: 24.48 KG/M2 | DIASTOLIC BLOOD PRESSURE: 67 MMHG | HEART RATE: 95 BPM | SYSTOLIC BLOOD PRESSURE: 97 MMHG | OXYGEN SATURATION: 96 % | WEIGHT: 143.4 LBS | HEIGHT: 64 IN

## 2023-11-16 DIAGNOSIS — M19.071 PRIMARY OSTEOARTHRITIS OF RIGHT FOOT: ICD-10-CM

## 2023-11-16 DIAGNOSIS — M79.671 RIGHT FOOT PAIN: Primary | ICD-10-CM

## 2023-11-16 RX ORDER — LOSARTAN POTASSIUM 25 MG/1
25 TABLET ORAL DAILY
Qty: 90 TABLET | Refills: 3 | OUTPATIENT
Start: 2023-11-16

## 2023-11-17 ENCOUNTER — DOCUMENTATION (OUTPATIENT)
Dept: PODIATRY | Facility: CLINIC | Age: 78
End: 2023-11-17
Payer: MEDICARE

## 2023-11-17 NOTE — PROGRESS NOTES
"Patient was seen at the [facility] 11/16/23 for right foot. After checking out at the , patient's  who was not present at the exam had questions. Went the the  to speak to the patient and her . The  states the patient was to been seen for her a \"crack\" in her left heel. Advised that the patient did not mention the left foot during her visit. He states she had xrays done on that foot as well. After looking through the chart there were no xray of the left foot in our system. Patient was advised of this. The patient's  states she was seen in [facility]. When asked what office they were unsure. Per Dr Pearson, an xray order for the left foot will   be placed in the chart. Once the patient has had the xray done they are to call the office so that Dr Pearson can review the xray and results will be relayed to the patient.  "

## 2023-11-17 NOTE — PROGRESS NOTES
[facility] - PODIATRY    Today's Date: 11/17/23    Patient Name: Theresa Davis  MRN: [id number]  CSN: [id number]  PCP: Waylon Tang MD, Referring Provider: Waylon Tang,*    SUBJECTIVE     Chief Complaint   Patient presents with    Right Foot - Pain    Establish Care     HPI: Theresa Davis, a 78 y.o.female, presents to clinic.    Patient is a 78-year-old female presenting with right foot pain.  Patient states it is sore and achy on the top of her foot.  Patient states it hurts when she is on her feet for long periods of time.  Patient states when she walks around the store and get sore.  She is here for further treatment.    Past Medical History:   Diagnosis Date    Anemia, unspecified     Cervicalgia     Essential (primary) hypertension     Mixed hyperlipidemia     Other long term (current) drug therapy     Other mixed anxiety disorders      Past Surgical History:   Procedure Laterality Date    APPENDECTOMY      CARPAL TUNNEL RELEASE      COLONOSCOPY  06/20/2012    Normal    HYSTERECTOMY      PARTIAL    THORACIC OUTLET SURGERY Left      Family History   Problem Relation Age of Onset    Breast cancer Mother     Coronary artery disease Father      Social History     Socioeconomic History    Marital status:     Number of children: 2   Tobacco Use    Smoking status: Never    Smokeless tobacco: Never   Vaping Use    Vaping Use: Never used   Substance and Sexual Activity    Alcohol use: Never    Drug use: Never    Sexual activity: Defer     Allergies   Allergen Reactions    Penicillins Rash and GI Intolerance    Sulfa Antibiotics Unknown - Low Severity     Current Outpatient Medications   Medication Sig Dispense Refill    denosumab (PROLIA) 60 MG/ML solution prefilled syringe syringe Inject 1 mL under the skin into the appropriate area as directed Every 6 (Six) Months. T score -2.5, Dx M81.0 1 mL 2    folic acid (FOLVITE) 1 MG tablet Take 1 tablet by mouth Daily. 90 tablet 3     LORazepam (ATIVAN) 0.5 MG tablet TAKE 1/2-1 TABLET BY MOUTH TWICE DAILY AS NEEDED FOR ANXIETY 40 tablet 1    losartan-hydrochlorothiazide (HYZAAR) 100-25 MG per tablet Take 1 tablet by mouth Daily. 90 tablet 1    NIFEdipine CC (ADALAT CC) 30 MG 24 hr tablet Take 1 tablet by mouth Every Night. 90 tablet 3    Nutritional Supplements (QUINOA KALE & HEMP PO) As Needed.      sertraline (ZOLOFT) 100 MG tablet Take 1 tablet by mouth Daily. 90 tablet 3    simvastatin (ZOCOR) 20 MG tablet Take 1 tablet by mouth Every Night. 90 tablet 3     No current facility-administered medications for this visit.     Review of Systems   Constitutional: Negative.    HENT: Negative.     Eyes: Negative.    Respiratory: Negative.     Cardiovascular: Negative.    Gastrointestinal: Negative.    Endocrine: Negative.    Genitourinary: Negative.    Musculoskeletal:  Positive for arthralgias.   Skin: Negative.    Allergic/Immunologic: Negative.    Neurological: Negative.    Hematological: Negative.    Psychiatric/Behavioral: Negative.     All other systems reviewed and are negative.      OBJECTIVE     Vitals:    11/16/23 1335   BP: 97/67   Pulse: 95   SpO2: 96%       WBC   Date Value Ref Range Status   09/12/2023 6.99 3.40 - 10.80 10*3/mm3 Final   05/27/2022 8.58 3.70 - 10.30 10*3/uL Final     RBC   Date Value Ref Range Status   09/12/2023 3.66 (L) 3.77 - 5.28 10*6/mm3 Final   05/27/2022 2.37 (L) 3.90 - 5.20 10*6/uL Final     Hemoglobin   Date Value Ref Range Status   09/12/2023 11.2 (L) 12.0 - 15.9 g/dL Final   05/27/2022 7.5 (L) 11.2 - 15.7 g/dL Final     Hematocrit   Date Value Ref Range Status   09/12/2023 33.5 (L) 34.0 - 46.6 % Final   05/27/2022 21.8 (L) 34.0 - 45.0 % Final     MCV   Date Value Ref Range Status   09/12/2023 91.5 79.0 - 97.0 fL Final   05/27/2022 92 79 - 98 fL Final     MCH   Date Value Ref Range Status   09/12/2023 30.6 26.6 - 33.0 pg Final   05/27/2022 31.6 26.0 - 32.0 pg Final     [facility]   Date Value Ref Range Status    09/12/2023 33.4 31.5 - 35.7 g/dL Final   05/27/2022 34.4 30.7 - 35.5 g/dL Final     RDW   Date Value Ref Range Status   09/12/2023 12.0 (L) 12.3 - 15.4 % Final   05/27/2022 12.4 11.5 - 14.5 % Final     RDW-SD   Date Value Ref Range Status   09/12/2023 41.0 37.0 - 54.0 fl Final     MPV   Date Value Ref Range Status   09/12/2023 8.6 6.0 - 12.0 fL Final   05/27/2022 8.3 (L) 8.8 - 12.5 fL Final     Platelets   Date Value Ref Range Status   09/12/2023 308 140 - 450 10*3/mm3 Final   05/27/2022 487 (H) 155 - 369 10*3/uL Final     Neutrophil Rel %   Date Value Ref Range Status   05/24/2022 76.0 % Final     Neutrophil %   Date Value Ref Range Status   06/06/2022 34.6 (L) 42.7 - 76.0 % Final     Lymphocyte Rel %   Date Value Ref Range Status   05/24/2022 12.0 % Final     Lymphocyte %   Date Value Ref Range Status   06/06/2022 44.8 19.6 - 45.3 % Final     Monocyte Rel %   Date Value Ref Range Status   05/24/2022 11.0 % Final     Monocyte %   Date Value Ref Range Status   06/06/2022 13.4 (H) 5.0 - 12.0 % Final     Eosinophil %   Date Value Ref Range Status   06/06/2022 5.3 0.3 - 6.2 % Final   05/24/2022 0.0 % Final     Basophil Rel %   Date Value Ref Range Status   05/24/2022 0.0 % Final     Basophil %   Date Value Ref Range Status   06/06/2022 1.2 0.0 - 1.5 % Final     Immature Grans %   Date Value Ref Range Status   06/06/2022 0.7 (H) 0.0 - 0.5 % Final   05/24/2022 1.0 % Final     Neutrophils Absolute   Date Value Ref Range Status   05/24/2022 7.56 (H) 1.60 - 6.10 10*3/uL Final     Neutrophils, Absolute   Date Value Ref Range Status   06/06/2022 2.04 1.70 - 7.00 10*3/mm3 Final     Lymphocytes Absolute   Date Value Ref Range Status   05/24/2022 1.13 (L) 1.20 - 3.90 10*3/uL Final     Lymphocytes, Absolute   Date Value Ref Range Status   06/06/2022 2.64 0.70 - 3.10 10*3/mm3 Final     Monocytes Absolute   Date Value Ref Range Status   05/24/2022 1.08 (H) 0.30 - 0.90 10*3/uL Final     Monocytes, Absolute   Date Value Ref Range  Status   06/06/2022 0.79 0.10 - 0.90 10*3/mm3 Final     Eosinophils Absolute   Date Value Ref Range Status   05/24/2022 0.00 0.00 - 0.50 10*3/uL Final     Eosinophils, Absolute   Date Value Ref Range Status   06/06/2022 0.31 0.00 - 0.40 10*3/mm3 Final     Basophils Absolute   Date Value Ref Range Status   05/24/2022 0.02 0.00 - 0.10 10*3/uL Final     Basophils, Absolute   Date Value Ref Range Status   06/06/2022 0.07 0.00 - 0.20 10*3/mm3 Final     Immature Grans, Absolute   Date Value Ref Range Status   06/06/2022 0.04 0.00 - 0.05 10*3/mm3 Final   05/24/2022 0.05 0.00 - 0.06 10*3/uL Final     nRBC   Date Value Ref Range Status   06/06/2022 0.0 0.0 - 0.2 /100 WBC Final         Lab Results   Component Value Date    GLUCOSE 94 09/12/2023    BUN 25 (H) 09/12/2023    CREATININE 1.25 (H) 09/12/2023    EGFRIFNONA 60 (L) 05/27/2022    EGFRIFAFRI >60 05/27/2022    BCR 20.0 09/12/2023    K 4.0 09/12/2023    CO2 24.5 09/12/2023    CALCIUM 9.3 09/12/2023    ALBUMIN 4.3 09/12/2023    LABIL2 1.5 10/22/2020    AST 14 09/12/2023    ALT 11 09/12/2023       Patient seen in no apparent distress.      PHYSICAL EXAM:     Foot/Ankle Exam    GENERAL  Appearance:  appears stated age  Orientation:  AAOx3  Affect:  appropriate  Gait:  unimpaired  Assistance:  independent  Right shoe gear: casual shoe  Left shoe gear: casual shoe    VASCULAR     Right Foot Vascularity   Normal vascular exam    Dorsalis pedis:  2+  Posterior tibial:  2+  Skin temperature:  warm  Edema grading:  None  CFT:  < 3 seconds  Pedal hair growth:  Present  Varicosities:  none     Left Foot Vascularity   Normal vascular exam    Dorsalis pedis:  2+  Posterior tibial:  2+  Skin temperature:  warm  Edema grading:  None  CFT:  < 3 seconds  Pedal hair growth:  Present  Varicosities:  none     NEUROLOGIC     Right Foot Neurologic   Normal sensation    Light touch sensation: normal  Vibratory sensation: normal  Hot/Cold sensation: normal  Protective Sensation using  [place] Monofilament:   Sites intact: 10  Sites tested: 10     Left Foot Neurologic   Normal sensation    Light touch sensation: normal  Vibratory sensation: normal  Hot/Cold sensation:  normal  Protective Sensation using [place] Pool-Anita Monofilament:   Sites intact: 10  Sites tested: 10    MUSCULOSKELETAL     Right Foot Musculoskeletal   Tenderness:  tarsometatarsal joints tenderness      MUSCLE STRENGTH     Right Foot Muscle Strength   Foot dorsiflexion:  4  Foot plantar flexion:  4  Foot inversion:  4  Foot eversion:  4     Left Foot Muscle Strength   Foot dorsiflexion:  4  Foot plantar flexion:  4  Foot inversion:  4  Foot eversion:  4    RANGE OF MOTION     Right Foot Range of Motion   Foot and ankle ROM within normal limits    Foot eversion:  with pain  Foot inversion:  with pain     Left Foot Range of Motion   Foot and ankle ROM within normal limits      DERMATOLOGIC      Right Foot Dermatologic   Skin  Right foot skin is intact.      Left Foot Dermatologic   Skin  Left foot skin is intact.       RADIOLOGY:        No results found.    ASSESSMENT/PLAN     Diagnoses and all orders for this visit:    1. Right foot pain (Primary)    2. Primary osteoarthritis of right foot    Patient to begin stretching exercises and icing in the evening as tolerated. Discussed compression therapy and resting the extremity.  Anti-inflammatory medication to begin taking if okay by PCP.    Over-the-counter inserts for the right and left foot    Return to clinic in 3 months    Comprehensive lower extremity examination and evaluation was performed.    Discussed findings and treatment plan including risks, benefits, and treatment options with patient in detail. Patient agreed with treatment plan.    Medications and allergies reviewed.  Reviewed available lab values along with other pertinent labs.  These were discussed with the patient.    An After Visit Summary was printed and given to the patient at discharge, including (if  requested) any available informative/educational handouts regarding diagnosis, treatment, or medications. All questions were answered to patient/family satisfaction. Should symptoms fail to improve or worsen they agree to call or return to clinic or to go to the Emergency Department. Discussed the importance of following up with any needed screening tests/labs/specialist appointments and any requested follow-up recommended by me today. Importance of maintaining follow-up discussed and patient accepts that missed appointments can delay diagnosis and potentially lead to worsening of conditions.    Return in about 3 months (around 2/16/2024)., or sooner if acute issues arise.    This document has been electronically signed by Nile Pearson DPM on November 17, 2023 07:09 EST

## 2023-12-04 ENCOUNTER — OFFICE VISIT (OUTPATIENT)
Dept: ORTHOPEDIC SURGERY | Facility: CLINIC | Age: 78
End: 2023-12-04
Payer: MEDICARE

## 2023-12-04 VITALS
OXYGEN SATURATION: 94 % | HEART RATE: 65 BPM | SYSTOLIC BLOOD PRESSURE: 122 MMHG | HEIGHT: 64 IN | WEIGHT: 143.3 LBS | DIASTOLIC BLOOD PRESSURE: 78 MMHG | BODY MASS INDEX: 24.46 KG/M2

## 2023-12-04 DIAGNOSIS — M70.71 BURSITIS OF OTHER BURSA OF RIGHT HIP: Primary | ICD-10-CM

## 2023-12-04 DIAGNOSIS — M70.62 TROCHANTERIC BURSITIS OF LEFT HIP: ICD-10-CM

## 2023-12-04 PROCEDURE — 20610 DRAIN/INJ JOINT/BURSA W/O US: CPT | Performed by: PHYSICIAN ASSISTANT

## 2023-12-04 PROCEDURE — 1159F MED LIST DOCD IN RCRD: CPT | Performed by: PHYSICIAN ASSISTANT

## 2023-12-04 PROCEDURE — 3078F DIAST BP <80 MM HG: CPT | Performed by: PHYSICIAN ASSISTANT

## 2023-12-04 PROCEDURE — 1160F RVW MEDS BY RX/DR IN RCRD: CPT | Performed by: PHYSICIAN ASSISTANT

## 2023-12-04 PROCEDURE — 3074F SYST BP LT 130 MM HG: CPT | Performed by: PHYSICIAN ASSISTANT

## 2023-12-04 RX ORDER — LIDOCAINE HYDROCHLORIDE 10 MG/ML
5 INJECTION, SOLUTION INFILTRATION; PERINEURAL
Status: COMPLETED | OUTPATIENT
Start: 2023-12-04 | End: 2023-12-04

## 2023-12-04 RX ORDER — TRIAMCINOLONE ACETONIDE 40 MG/ML
40 INJECTION, SUSPENSION INTRA-ARTICULAR; INTRAMUSCULAR
Status: COMPLETED | OUTPATIENT
Start: 2023-12-04 | End: 2023-12-04

## 2023-12-04 RX ORDER — LOSARTAN POTASSIUM 50 MG/1
TABLET ORAL
COMMUNITY
Start: 2023-11-30

## 2023-12-04 RX ADMIN — LIDOCAINE HYDROCHLORIDE 5 ML: 10 INJECTION, SOLUTION INFILTRATION; PERINEURAL at 16:10

## 2023-12-04 RX ADMIN — LIDOCAINE HYDROCHLORIDE 5 ML: 10 INJECTION, SOLUTION INFILTRATION; PERINEURAL at 15:32

## 2023-12-04 RX ADMIN — TRIAMCINOLONE ACETONIDE 40 MG: 40 INJECTION, SUSPENSION INTRA-ARTICULAR; INTRAMUSCULAR at 15:32

## 2023-12-04 RX ADMIN — TRIAMCINOLONE ACETONIDE 40 MG: 40 INJECTION, SUSPENSION INTRA-ARTICULAR; INTRAMUSCULAR at 16:10

## 2023-12-04 NOTE — PROGRESS NOTES
"Chief Complaint  Pain and Follow-up of the Right Hip    Subjective          Pain      Theresa Davis is a 78 y.o. female  presents to [facility] for     Patient presents with her  for follow-up evaluation of bilateral hip pain she has had pain in her hips for over 2 years she was last seen in August for her hip she elected to have a right hip bursal injection which she tolerated well and did help she states both hips are hurting today she states they hurt equally she admits to pain laying on her side pain with movement, standing for long periods.      Allergies   Allergen Reactions   • Penicillins Rash and GI Intolerance   • Sulfa Antibiotics Unknown - Low Severity        Social History     Socioeconomic History   • Marital status:    • Number of children: 2   Tobacco Use   • Smoking status: Never   • Smokeless tobacco: Never   Vaping Use   • Vaping Use: Never used   Substance and Sexual Activity   • Alcohol use: Never   • Drug use: Never   • Sexual activity: Defer        REVIEW OF SYSTEMS    Constitutional: Awake alert and oriented x3, no acute distress, denies fevers, chills, weight loss  Respiratory: No respiratory distress  Vascular: Brisk cap refill, Intact distal pulses, No cyanosis, compartments soft with no signs or symptoms of compartment syndrome or DVT.   Cardiovascular: Denies chest pain, shortness of breath  Skin: Denies rashes, acute skin changes  Neurologic: Denies headache, loss of consciousness  MSK: Bilateral hip pain      Objective   Vital Signs:   /78   Pulse 65   Ht 162.6 cm (64\")   Wt 65 kg (143 lb 4.8 oz)   SpO2 94%   BMI 24.60 kg/m²     Body mass index is 24.6 kg/m².    Physical Exam       Right hip- Positive EHL, FHL, GS and TA. Sensation intact to all 5 nerves of the foot. Positive pulses. Tender to the lateral hip. Unsteady gait. Flexion 100. External Rotation 35. Internal rotation 20. Pain with straight leg raise      Left hip- mild " tenderness to the lateral hip. Unsteady gait. Flexion 100. External Rotation 40. Internal rotation 20 negative straight leg raise Positive EHL, FHL, GS and TA. Sensation intact to all 5 nerves of the foot. Positive pulses.       Large Joint Arthrocentesis: R greater trochanteric bursa  Date/Time: 12/4/2023 3:32 PM  Consent given by: patient  Site marked: site marked  Timeout: Immediately prior to procedure a time out was called to verify the correct patient, procedure, equipment, support staff and site/side marked as required   Supporting Documentation  Indications: pain   Procedure Details  Location: hip - R greater trochanteric bursa  Preparation: Patient was prepped and draped in the usual sterile fashion  Needle gauge: 21 G.  Approach: lateral  Medications administered: 5 mL lidocaine 1 %; 40 mg triamcinolone acetonide 40 MG/ML  Patient tolerance: patient tolerated the procedure well with no immediate complications      Large Joint Arthrocentesis: L greater trochanteric bursa  Date/Time: 12/4/2023 4:10 PM  Consent given by: patient  Site marked: site marked  Timeout: Immediately prior to procedure a time out was called to verify the correct patient, procedure, equipment, support staff and site/side marked as required   Supporting Documentation  Indications: pain   Procedure Details  Location: hip - L greater trochanteric bursa  Preparation: Patient was prepped and draped in the usual sterile fashion  Needle gauge: 21 G.  Approach: lateral  Medications administered: 5 mL lidocaine 1 %; 40 mg triamcinolone acetonide 40 MG/ML  Patient tolerance: patient tolerated the procedure well with no immediate complications      Imaging Results (Most Recent)       None             Result Review :   The following data was reviewed by: NATA Enrique on 12/04/2023:               Assessment and Plan    Diagnoses and all orders for this visit:    1. Bursitis of other bursa of right hip (Primary)    2. Trochanteric  bursitis of left hip    Other orders  -     Large Joint Arthrocentesis: R greater trochanteric bursa        Discussed diagnosis and treatment options with the patient she elected to have bilateral hip bursitis injections which she tolerated well follow-up in 3 months    Call or return if worsening symptoms.    Follow Up   Return in about 3 months (around 3/4/2024).  Patient was given instructions and counseling regarding her condition or for health maintenance advice. Please see specific information pulled into the AVS if appropriate.       EMR Dragon/Transcription disclaimer:  Much of this encounter note is an electronic transcription/translation of spoken language to printed text, aka voice recognition.  The electronic translation of spoken language may permit erroneous or at times nonsensical words or phrases to be inadvertently transcribed; although I have reviewed the note for such errors, some may still exist so please interpret based on surrounding text content.

## 2023-12-30 DIAGNOSIS — I10 ESSENTIAL (PRIMARY) HYPERTENSION: Primary | ICD-10-CM

## 2024-01-03 RX ORDER — LOSARTAN POTASSIUM AND HYDROCHLOROTHIAZIDE 25; 100 MG/1; MG/1
1 TABLET ORAL DAILY
Qty: 90 TABLET | Refills: 0 | Status: SHIPPED | OUTPATIENT
Start: 2024-01-03

## 2024-01-03 NOTE — TELEPHONE ENCOUNTER
I have sent a single refill on losartan HCTZ.  Please make both her and her  aware.  There has been confusion regarding her medications in the past.  She has pending labs that should be completed.  Thanks.

## 2024-01-03 NOTE — TELEPHONE ENCOUNTER
Pt also has plain losartan listed in chart, but she states she does not have a rx for that and has not been taking it.

## 2024-01-11 DIAGNOSIS — F41.3 OTHER MIXED ANXIETY DISORDERS: ICD-10-CM

## 2024-01-11 DIAGNOSIS — Z79.899 OTHER LONG TERM (CURRENT) DRUG THERAPY: ICD-10-CM

## 2024-01-12 RX ORDER — LORAZEPAM 0.5 MG/1
TABLET ORAL
Qty: 10 TABLET | Refills: 0 | Status: SHIPPED | OUTPATIENT
Start: 2024-01-12

## 2024-02-05 DIAGNOSIS — Z79.899 OTHER LONG TERM (CURRENT) DRUG THERAPY: ICD-10-CM

## 2024-02-05 DIAGNOSIS — F41.3 OTHER MIXED ANXIETY DISORDERS: ICD-10-CM

## 2024-02-05 RX ORDER — LORAZEPAM 0.5 MG/1
.25-.5 TABLET ORAL 2 TIMES DAILY PRN
Qty: 40 TABLET | Refills: 0 | Status: SHIPPED | OUTPATIENT
Start: 2024-02-05

## 2024-03-16 DIAGNOSIS — I10 ESSENTIAL (PRIMARY) HYPERTENSION: ICD-10-CM

## 2024-03-18 RX ORDER — LOSARTAN POTASSIUM 50 MG/1
50 TABLET ORAL DAILY
Qty: 90 TABLET | Refills: 3 | OUTPATIENT
Start: 2024-03-18

## 2024-03-24 DIAGNOSIS — F41.3 OTHER MIXED ANXIETY DISORDERS: ICD-10-CM

## 2024-03-25 RX ORDER — SERTRALINE HYDROCHLORIDE 100 MG/1
100 TABLET, FILM COATED ORAL DAILY
Qty: 90 TABLET | Refills: 0 | Status: SHIPPED | OUTPATIENT
Start: 2024-03-25

## 2024-04-26 ENCOUNTER — LAB (OUTPATIENT)
Dept: LAB | Facility: HOSPITAL | Age: 79
End: 2024-04-26
Payer: MEDICARE

## 2024-04-26 ENCOUNTER — OFFICE VISIT (OUTPATIENT)
Dept: FAMILY MEDICINE CLINIC | Age: 79
End: 2024-04-26
Payer: MEDICARE

## 2024-04-26 VITALS
HEART RATE: 98 BPM | DIASTOLIC BLOOD PRESSURE: 53 MMHG | OXYGEN SATURATION: 98 % | WEIGHT: 132 LBS | SYSTOLIC BLOOD PRESSURE: 99 MMHG | BODY MASS INDEX: 22.53 KG/M2 | HEIGHT: 64 IN

## 2024-04-26 DIAGNOSIS — E53.8 FOLIC ACID DEFICIENCY: ICD-10-CM

## 2024-04-26 DIAGNOSIS — R63.4 ABNORMAL WEIGHT LOSS: ICD-10-CM

## 2024-04-26 DIAGNOSIS — Z79.899 OTHER LONG TERM (CURRENT) DRUG THERAPY: ICD-10-CM

## 2024-04-26 DIAGNOSIS — E78.2 MIXED HYPERLIPIDEMIA: ICD-10-CM

## 2024-04-26 DIAGNOSIS — I10 ESSENTIAL HYPERTENSION: ICD-10-CM

## 2024-04-26 DIAGNOSIS — F41.1 GENERALIZED ANXIETY DISORDER: Primary | ICD-10-CM

## 2024-04-26 DIAGNOSIS — I10 ESSENTIAL (PRIMARY) HYPERTENSION: ICD-10-CM

## 2024-04-26 LAB
ALBUMIN SERPL-MCNC: 4.3 G/DL (ref 3.5–5.2)
ALBUMIN/GLOB SERPL: 1.4 G/DL
ALP SERPL-CCNC: 94 U/L (ref 39–117)
ALT SERPL W P-5'-P-CCNC: 10 U/L (ref 1–33)
AMPHET+METHAMPHET UR QL: NEGATIVE
AMPHETAMINES UR QL: NEGATIVE
ANION GAP SERPL CALCULATED.3IONS-SCNC: 12.6 MMOL/L (ref 5–15)
AST SERPL-CCNC: 16 U/L (ref 1–32)
BARBITURATES UR QL SCN: NEGATIVE
BASOPHILS # BLD AUTO: 0.07 10*3/MM3 (ref 0–0.2)
BASOPHILS NFR BLD AUTO: 1 % (ref 0–1.5)
BENZODIAZ UR QL SCN: NEGATIVE
BILIRUB SERPL-MCNC: 0.3 MG/DL (ref 0–1.2)
BUN SERPL-MCNC: 25 MG/DL (ref 8–23)
BUN/CREAT SERPL: 17.5 (ref 7–25)
BUPRENORPHINE SERPL-MCNC: NEGATIVE NG/ML
CALCIUM SPEC-SCNC: 10 MG/DL (ref 8.6–10.5)
CANNABINOIDS SERPL QL: NEGATIVE
CHLORIDE SERPL-SCNC: 96 MMOL/L (ref 98–107)
CHOLEST SERPL-MCNC: 177 MG/DL (ref 0–200)
CO2 SERPL-SCNC: 26.4 MMOL/L (ref 22–29)
COCAINE UR QL: NEGATIVE
CREAT SERPL-MCNC: 1.43 MG/DL (ref 0.57–1)
DEPRECATED RDW RBC AUTO: 38.2 FL (ref 37–54)
EGFRCR SERPLBLD CKD-EPI 2021: 38.1 ML/MIN/1.73
EOSINOPHIL # BLD AUTO: 0.28 10*3/MM3 (ref 0–0.4)
EOSINOPHIL NFR BLD AUTO: 4.1 % (ref 0.3–6.2)
ERYTHROCYTE [DISTWIDTH] IN BLOOD BY AUTOMATED COUNT: 11.4 % (ref 12.3–15.4)
EXPIRATION DATE: NORMAL
FOLATE SERPL-MCNC: >20 NG/ML (ref 4.78–24.2)
GLOBULIN UR ELPH-MCNC: 3 GM/DL
GLUCOSE SERPL-MCNC: 91 MG/DL (ref 65–99)
HCT VFR BLD AUTO: 36.1 % (ref 34–46.6)
HDLC SERPL-MCNC: 59 MG/DL (ref 40–60)
HGB BLD-MCNC: 12.1 G/DL (ref 12–15.9)
IMM GRANULOCYTES # BLD AUTO: 0.01 10*3/MM3 (ref 0–0.05)
IMM GRANULOCYTES NFR BLD AUTO: 0.1 % (ref 0–0.5)
LDLC SERPL CALC-MCNC: 99 MG/DL (ref 0–100)
LDLC/HDLC SERPL: 1.64 {RATIO}
LYMPHOCYTES # BLD AUTO: 2.68 10*3/MM3 (ref 0.7–3.1)
LYMPHOCYTES NFR BLD AUTO: 38.8 % (ref 19.6–45.3)
Lab: NORMAL
MCH RBC QN AUTO: 30 PG (ref 26.6–33)
MCHC RBC AUTO-ENTMCNC: 33.5 G/DL (ref 31.5–35.7)
MCV RBC AUTO: 89.4 FL (ref 79–97)
MDMA UR QL SCN: NEGATIVE
METHADONE UR QL SCN: NEGATIVE
MONOCYTES # BLD AUTO: 0.72 10*3/MM3 (ref 0.1–0.9)
MONOCYTES NFR BLD AUTO: 10.4 % (ref 5–12)
MORPHINE/OPIATES SCREEN, URINE: NEGATIVE
NEUTROPHILS NFR BLD AUTO: 3.14 10*3/MM3 (ref 1.7–7)
NEUTROPHILS NFR BLD AUTO: 45.6 % (ref 42.7–76)
OXYCODONE UR QL SCN: NEGATIVE
PCP UR QL SCN: NEGATIVE
PLATELET # BLD AUTO: 358 10*3/MM3 (ref 140–450)
PMV BLD AUTO: 8.7 FL (ref 6–12)
POTASSIUM SERPL-SCNC: 3.5 MMOL/L (ref 3.5–5.2)
PROT SERPL-MCNC: 7.3 G/DL (ref 6–8.5)
RBC # BLD AUTO: 4.04 10*6/MM3 (ref 3.77–5.28)
SODIUM SERPL-SCNC: 135 MMOL/L (ref 136–145)
TRIGL SERPL-MCNC: 106 MG/DL (ref 0–150)
TSH SERPL DL<=0.05 MIU/L-ACNC: 2.81 UIU/ML (ref 0.27–4.2)
VIT B12 BLD-MCNC: 422 PG/ML (ref 211–946)
VLDLC SERPL-MCNC: 19 MG/DL (ref 5–40)
WBC NRBC COR # BLD AUTO: 6.9 10*3/MM3 (ref 3.4–10.8)

## 2024-04-26 PROCEDURE — 85025 COMPLETE CBC W/AUTO DIFF WBC: CPT

## 2024-04-26 PROCEDURE — 3078F DIAST BP <80 MM HG: CPT | Performed by: FAMILY MEDICINE

## 2024-04-26 PROCEDURE — 84443 ASSAY THYROID STIM HORMONE: CPT

## 2024-04-26 PROCEDURE — 80061 LIPID PANEL: CPT

## 2024-04-26 PROCEDURE — 3074F SYST BP LT 130 MM HG: CPT | Performed by: FAMILY MEDICINE

## 2024-04-26 PROCEDURE — 80053 COMPREHEN METABOLIC PANEL: CPT

## 2024-04-26 PROCEDURE — 80305 DRUG TEST PRSMV DIR OPT OBS: CPT | Performed by: FAMILY MEDICINE

## 2024-04-26 PROCEDURE — 1159F MED LIST DOCD IN RCRD: CPT | Performed by: FAMILY MEDICINE

## 2024-04-26 PROCEDURE — 82607 VITAMIN B-12: CPT

## 2024-04-26 PROCEDURE — 36415 COLL VENOUS BLD VENIPUNCTURE: CPT

## 2024-04-26 PROCEDURE — 1160F RVW MEDS BY RX/DR IN RCRD: CPT | Performed by: FAMILY MEDICINE

## 2024-04-26 PROCEDURE — 82746 ASSAY OF FOLIC ACID SERUM: CPT

## 2024-04-26 RX ORDER — LORAZEPAM 0.5 MG/1
.25-.5 TABLET ORAL 2 TIMES DAILY PRN
Qty: 40 TABLET | Refills: 0 | Status: SHIPPED | OUTPATIENT
Start: 2024-04-26

## 2024-04-26 RX ORDER — SERTRALINE HYDROCHLORIDE 100 MG/1
100 TABLET, FILM COATED ORAL DAILY
Qty: 90 TABLET | Refills: 3 | Status: SHIPPED | OUTPATIENT
Start: 2024-04-26 | End: 2024-04-27

## 2024-04-26 RX ORDER — FOLIC ACID 1 MG/1
1 TABLET ORAL DAILY
Qty: 90 TABLET | Refills: 3 | Status: SHIPPED | OUTPATIENT
Start: 2024-04-26

## 2024-04-26 RX ORDER — SIMVASTATIN 20 MG
20 TABLET ORAL NIGHTLY
Qty: 90 TABLET | Refills: 3 | Status: SHIPPED | OUTPATIENT
Start: 2024-04-26

## 2024-04-26 NOTE — PROGRESS NOTES
"Theresa Davis presents to [facility].    Chief Complaint:  Anxiety follow up, blood pressure, cholesterol, weight loss    Subjective   History of Present Illness:  Theresa is being seen today for follow up on her care.  She has chronic anxiety for which she currently takes sertraline and lorazepam.  She says that she is doing \"terrible\" with regard to anxiety.  She denies any intention to harm herself.  She is having significant difficulty sleeping.  She denies paranoia or auditory or visual hallucinations.    She also has hypertension and elevated cholesterol for which she remains on treatments as noted.  Her blood pressure is actually low normal today.    Theresa has lost a significant amount of weight since her most recent visit.  Her weight is down over 10 pounds.  She says that she just does not have much of an appetite.  She denies significant dysphagia or abdominal pain.  Her bowels are working okay for her.    Review of Systems:  Review of Systems   Constitutional:  Negative for chills and fever.   Respiratory:  Positive for shortness of breath (at times). Negative for cough.    Cardiovascular:  Negative for chest pain and palpitations.   Gastrointestinal:  Negative for abdominal pain, nausea and vomiting.        Objective   Medical History:  Past Medical History:    Anemia, unspecified    Cervicalgia    Essential (primary) hypertension    Mixed hyperlipidemia    Other long term (current) drug therapy    Other mixed anxiety disorders     Past Surgical History:    APPENDECTOMY    CARPAL TUNNEL RELEASE    COLONOSCOPY    Normal    HYSTERECTOMY    PARTIAL    THORACIC OUTLET SURGERY      Family History   Problem Relation Age of Onset    Breast cancer Mother     Coronary artery disease Father      Social History     Tobacco Use    Smoking status: Never    Smokeless tobacco: Never   Substance Use Topics    Alcohol use: Never       Health Maintenance Due   Topic Date Due    TDAP/TD VACCINES " "(1 - Tdap) Never done    ZOSTER VACCINE (1 of 2) Never done    RSV Vaccine - Adults (1 - 1-dose 60+ series) Never done    ANNUAL WELLNESS VISIT  08/12/2023    COVID-19 Vaccine (5 - 2023-24 season) 09/01/2023    DXA SCAN  11/09/2023    LIPID PANEL  03/23/2024        Immunization History   Administered Date(s) Administered    COVID-19 (PFIZER) BIVALENT 12+YRS 03/23/2023    COVID-19 (PFIZER) Purple Cap Monovalent 03/09/2021, 03/30/2021, 12/23/2021    Fluzone High Dose =>65 Years ([employer] ONLY) 10/21/2012, 10/10/2013, 10/02/2017    Fluzone High-Dose 65+yrs 01/12/2022, 09/16/2022    Influenza, Unspecified 09/23/2020    Pneumococcal Conjugate 13-Valent (PCV13) 08/12/2015    Pneumococcal Polysaccharide (PPSV23) 08/17/2012, 08/17/2012       Allergies   Allergen Reactions    Penicillins Rash and GI Intolerance    Sulfa Antibiotics Unknown - Low Severity        Medications:  Current Outpatient Medications on File Prior to Visit   Medication Sig    denosumab (PROLIA) 60 MG/ML solution prefilled syringe syringe Inject 1 mL under the skin into the appropriate area as directed Every 6 (Six) Months. T score -2.5, Dx M81.0    losartan-hydrochlorothiazide (HYZAAR) 100-25 MG per tablet Take 1 tablet by mouth Daily.    NIFEdipine CC (ADALAT CC) 30 MG 24 hr tablet Take 1 tablet by mouth Every Night.    Nutritional Supplements (QUINOA KALE & HEMP PO) As Needed.    [DISCONTINUED] folic acid (FOLVITE) 1 MG tablet Take 1 tablet by mouth Daily.    [DISCONTINUED] LORazepam (ATIVAN) 0.5 MG tablet Take 0.5-1 tablets by mouth 2 (Two) Times a Day As Needed for Anxiety.    [DISCONTINUED] sertraline (ZOLOFT) 100 MG tablet Take 1 tablet by mouth Daily.    [DISCONTINUED] simvastatin (ZOCOR) 20 MG tablet Take 1 tablet by mouth Every Night.     No current facility-administered medications on file prior to visit.       Vital Signs:   BP 99/53 (BP Location: Right arm, Patient Position: Sitting, Cuff Size: Small Adult)   Pulse 98   Ht 162.6 cm (64\")   " Wt 59.9 kg (132 lb)   SpO2 98%   BMI 22.66 kg/m²       Physical Exam:  Physical Exam  Vitals reviewed.   Constitutional:       General: She is not in acute distress.     Appearance: She is not ill-appearing.   Eyes:      Pupils: Pupils are equal, round, and reactive to light.   Neck:      Comments: No thyromegaly  Cardiovascular:      Rate and Rhythm: Normal rate and regular rhythm.   Pulmonary:      Effort: Pulmonary effort is normal.      Breath sounds: Normal breath sounds.   Abdominal:      General: There is no distension.      Palpations: Abdomen is soft.      Tenderness: There is no abdominal tenderness.   Musculoskeletal:      Cervical back: Neck supple.   Lymphadenopathy:      Cervical: No cervical adenopathy.   Skin:     Findings: No lesion or rash.   Neurological:      Mental Status: She is alert.         Result Review   The following data was reviewed by Waylon Tang MD on 04/26/2024.  Lab Results   Component Value Date    WBC 6.99 09/12/2023    HGB 11.2 (L) 09/12/2023    HCT 33.5 (L) 09/12/2023    MCV 91.5 09/12/2023     09/12/2023     Lab Results   Component Value Date    GLUCOSE 94 09/12/2023    BUN 25 (H) 09/12/2023    CREATININE 1.25 (H) 09/12/2023     09/12/2023    K 4.0 09/12/2023     09/12/2023    CO2 24.5 09/12/2023    CALCIUM 9.3 09/12/2023    PROTEINTOT 7.5 09/12/2023    ALBUMIN 4.3 09/12/2023    ALT 11 09/12/2023    AST 14 09/12/2023    ALKPHOS 91 09/12/2023    BILITOT 0.3 09/12/2023    EGFR 44.8 (L) 09/12/2023    GLOB 3.2 09/12/2023    AGRATIO 1.3 09/12/2023    BCR 20.0 09/12/2023    ANIONGAP 11.5 09/12/2023      Lab Results   Component Value Date    CHOL 197 03/23/2023    CHLPL 190 10/22/2020    TRIG 127 03/23/2023    HDL 55 03/23/2023     (H) 03/23/2023     Lab Results   Component Value Date    TSH 3.160 03/23/2023     Lab Results   Component Value Date    HGBA1C 5.30 03/23/2023     BMI is within normal parameters. No other follow-up for BMI required.          Assessment and Plan:   Today, we have reviewed her care.  I am very concerned about how Shirley is doing.  She has lost a significant amount of weight since she was here last and is having ongoing struggles with anxiety.  We will move ahead with updated labs as below and refill her usual medications.  Her blood pressure is actually low normal here.  We may need to decrease the dose of 1 or more of her blood pressure medications.  It would be a consideration to add something like Seroquel at night to see if it would be helpful with both sleep and appetite.  We will reach back out to her after reviewing her testing.  She denies any intention to harm herself.  Lorazepam does continue to be of benefit for her and will be refilled today.  Because of the concerns, we will plan to see her back in about 3 weeks for recheck.    Diagnoses and all orders for this visit:    1. Generalized anxiety disorder (Primary)  -     LORazepam (ATIVAN) 0.5 MG tablet; Take 0.5-1 tablets by mouth 2 (Two) Times a Day As Needed for Anxiety.  Dispense: 40 tablet; Refill: 0  -     sertraline (ZOLOFT) 100 MG tablet; Take 1 tablet by mouth Daily.  Dispense: 90 tablet; Refill: 3    2. Essential hypertension  -     Comprehensive Metabolic Panel; Future    3. Mixed hyperlipidemia  -     Comprehensive Metabolic Panel; Future  -     Lipid Panel; Future  -     TSH; Future  -     simvastatin (ZOCOR) 20 MG tablet; Take 1 tablet by mouth Every Night.  Dispense: 90 tablet; Refill: 3    4. Abnormal weight loss  -     TSH; Future    5. Other long term (current) drug therapy  -     CBC Auto Differential; Future  -     Vitamin B12 & Folate; Future  -     LORazepam (ATIVAN) 0.5 MG tablet; Take 0.5-1 tablets by mouth 2 (Two) Times a Day As Needed for Anxiety.  Dispense: 40 tablet; Refill: 0  -     POC Medline 12 Panel Urine Drug Screen    6. Folic acid deficiency  -     CBC Auto Differential; Future  -     Vitamin B12 & Folate; Future  -     folic acid  (FOLVITE) 1 MG tablet; Take 1 tablet by mouth Daily.  Dispense: 90 tablet; Refill: 3    Follow Up  Return in about 3 weeks (around 5/17/2024) for Recheck.  Patient was given instructions and counseling regarding her condition or for health maintenance advice. Please see specific information pulled into the AVS if appropriate.

## 2024-04-27 RX ORDER — SERTRALINE HYDROCHLORIDE 100 MG/1
150 TABLET, FILM COATED ORAL DAILY
Qty: 135 TABLET | Refills: 0 | Status: SHIPPED | OUTPATIENT
Start: 2024-04-27

## 2024-04-29 ENCOUNTER — OFFICE VISIT (OUTPATIENT)
Dept: ORTHOPEDIC SURGERY | Facility: CLINIC | Age: 79
End: 2024-04-29
Payer: MEDICARE

## 2024-04-29 VITALS
DIASTOLIC BLOOD PRESSURE: 60 MMHG | HEIGHT: 64 IN | BODY MASS INDEX: 22.55 KG/M2 | OXYGEN SATURATION: 97 % | SYSTOLIC BLOOD PRESSURE: 94 MMHG | WEIGHT: 132.06 LBS | HEART RATE: 92 BPM

## 2024-04-29 DIAGNOSIS — M70.71 BURSITIS OF OTHER BURSA OF RIGHT HIP: Primary | ICD-10-CM

## 2024-04-29 DIAGNOSIS — M70.62 TROCHANTERIC BURSITIS OF LEFT HIP: ICD-10-CM

## 2024-04-29 DIAGNOSIS — M17.11 ARTHRITIS OF KNEE, RIGHT: ICD-10-CM

## 2024-04-29 DIAGNOSIS — M25.561 RIGHT KNEE PAIN, UNSPECIFIED CHRONICITY: ICD-10-CM

## 2024-04-29 DIAGNOSIS — S82.001A CLOSED NONDISPLACED FRACTURE OF RIGHT PATELLA, UNSPECIFIED FRACTURE MORPHOLOGY, INITIAL ENCOUNTER: ICD-10-CM

## 2024-04-29 PROCEDURE — 1160F RVW MEDS BY RX/DR IN RCRD: CPT | Performed by: PHYSICIAN ASSISTANT

## 2024-04-29 PROCEDURE — 99213 OFFICE O/P EST LOW 20 MIN: CPT | Performed by: PHYSICIAN ASSISTANT

## 2024-04-29 PROCEDURE — 1159F MED LIST DOCD IN RCRD: CPT | Performed by: PHYSICIAN ASSISTANT

## 2024-04-29 PROCEDURE — 20610 DRAIN/INJ JOINT/BURSA W/O US: CPT | Performed by: PHYSICIAN ASSISTANT

## 2024-04-29 PROCEDURE — 3078F DIAST BP <80 MM HG: CPT | Performed by: PHYSICIAN ASSISTANT

## 2024-04-29 PROCEDURE — 3074F SYST BP LT 130 MM HG: CPT | Performed by: PHYSICIAN ASSISTANT

## 2024-04-29 RX ORDER — LIDOCAINE HYDROCHLORIDE 10 MG/ML
5 INJECTION, SOLUTION INFILTRATION; PERINEURAL
Status: COMPLETED | OUTPATIENT
Start: 2024-04-29 | End: 2024-04-29

## 2024-04-29 RX ORDER — TRIAMCINOLONE ACETONIDE 40 MG/ML
40 INJECTION, SUSPENSION INTRA-ARTICULAR; INTRAMUSCULAR
Status: COMPLETED | OUTPATIENT
Start: 2024-04-29 | End: 2024-04-29

## 2024-04-29 RX ADMIN — LIDOCAINE HYDROCHLORIDE 5 ML: 10 INJECTION, SOLUTION INFILTRATION; PERINEURAL at 13:08

## 2024-04-29 RX ADMIN — TRIAMCINOLONE ACETONIDE 40 MG: 40 INJECTION, SUSPENSION INTRA-ARTICULAR; INTRAMUSCULAR at 13:08

## 2024-04-29 RX ADMIN — LIDOCAINE HYDROCHLORIDE 5 ML: 10 INJECTION, SOLUTION INFILTRATION; PERINEURAL at 13:09

## 2024-04-29 RX ADMIN — TRIAMCINOLONE ACETONIDE 40 MG: 40 INJECTION, SUSPENSION INTRA-ARTICULAR; INTRAMUSCULAR at 13:09

## 2024-04-29 NOTE — PROGRESS NOTES
Chief Complaint  Follow-up and Pain of the Right Hip, Follow-up and Pain of the Right Knee, and Pain and Follow-up of the Left Hip    Subjective          Pain        Theresa Davis is a 78 y.o. female  presents to [facility] for     Patient presents complaining of right hip pain and right knee pain she was most recently seen for her hip on 12/4/2023 she has not been seen for the knees since November 2021.  She states the hip pain is in the lateral right hip she in the past she has had left and right hip injections but she states that her right hip hurts most today.  She also states that she had an injury recently where she fell on her knee she points anterior knee as her area of pain she states the pain has been worse in the knee with movement, standing walking for long periods she did not go to ER or urgent care when the injury occurred she is requesting a right knee injection in the past she has had relief with injections.  She denies locking catching buckling but does admit to some swelling and bruising when she fell      Allergies   Allergen Reactions    Penicillins Rash and GI Intolerance    Sulfa Antibiotics Unknown - Low Severity        Social History     Socioeconomic History    Marital status:     Number of children: 2   Tobacco Use    Smoking status: Never    Smokeless tobacco: Never   Vaping Use    Vaping status: Never Used   Substance and Sexual Activity    Alcohol use: Never    Drug use: Never    Sexual activity: Defer        REVIEW OF SYSTEMS    Constitutional: Awake alert and oriented x3, no acute distress, denies fevers, chills, weight loss  Respiratory: No respiratory distress  Vascular: Brisk cap refill, Intact distal pulses, No cyanosis, compartments soft with no signs or symptoms of compartment syndrome or DVT.   Cardiovascular: Denies chest pain, shortness of breath  Skin: Denies rashes, acute skin changes  Neurologic: Denies headache, loss of  "consciousness  MSK: Right knee pain, right hip pain      Objective   Vital Signs:   BP 94/60   Pulse 92   Ht 162.6 cm (64\")   Wt 59.9 kg (132 lb 0.9 oz)   SpO2 97%   BMI 22.67 kg/m²     Body mass index is 22.67 kg/m².    Physical Exam       Right knee: Skin is intact, no erythema, no ecchymosis, no swelling or effusion, no signs of infection, full extension flexion 120, stable anterior/posterior drawer stable to varus/valgus stress, tenderness to palpation of the anterior lateral knee in the area of the fracture site, mild pain with range of motion, patient able hold straight leg raise.  Right hip: Tender to palpation of lateral hip over the greater trochanter, flexion 100, external rotation 35 internal rotation 20, pain with straight leg raise.      Large Joint Arthrocentesis: L greater trochanteric bursa  Date/Time: 4/29/2024 1:08 PM  Consent given by: patient  Site marked: site marked  Timeout: Immediately prior to procedure a time out was called to verify the correct patient, procedure, equipment, support staff and site/side marked as required   Supporting Documentation  Indications: pain   Procedure Details  Location: hip - L greater trochanteric bursa  Preparation: Patient was prepped and draped in the usual sterile fashion  Needle gauge: 21 G.  Approach: lateral  Medications administered: 5 mL lidocaine 1 %; 40 mg triamcinolone acetonide 40 MG/ML  Patient tolerance: patient tolerated the procedure well with no immediate complications (injection scribed for Leonel PEACE)      Large Joint Arthrocentesis: R greater trochanteric bursa  Date/Time: 4/29/2024 1:09 PM  Consent given by: patient  Site marked: site marked  Timeout: Immediately prior to procedure a time out was called to verify the correct patient, procedure, equipment, support staff and site/side marked as required   Supporting Documentation  Indications: pain   Procedure Details  Location: hip - R greater trochanteric bursa  Preparation: " Patient was prepped and draped in the usual sterile fashion  Needle size: 22 G  Approach: lateral  Medications administered: 5 mL lidocaine 1 %; 40 mg triamcinolone acetonide 40 MG/ML  Patient tolerance: patient tolerated the procedure well with no immediate complications (injection scribed for Leonel PEACE)        Imaging Results (Most Recent)       Procedure Component Value Units Date/Time    XR Knee 3 View Right [[id number]] Resulted: 04/29/24 1347     Updated: 04/29/24 1351    Narrative:      View:AP/Lateral and [place] view(s)  Site: Right knee  Indication: Right knee pain  Study: X-rays ordered, taken in the office, and reviewed today  X-ray: Nondisplaced fracture to the lateral patella with advanced   degenerative changes to the medial joint space and osteophyte formation,   postoperative changes of intramedullary nail of the hip.  Comparative data: Previous studies             Result Review :   The following data was reviewed by: NATA Enrique on 04/29/2024:  Data reviewed : Radiologic studies reviewed by me with the patient              Assessment and Plan    Diagnoses and all orders for this visit:    1. Bursitis of other bursa of right hip (Primary)  -     Large Joint Arthrocentesis: R greater trochanteric bursa    2. Right knee pain, unspecified chronicity  -     Large Joint Arthrocentesis: L greater trochanteric bursa  -     XR Knee 3 View Right    3. Arthritis of knee, right  -     Large Joint Arthrocentesis: L greater trochanteric bursa    4. Closed nondisplaced fracture of right patella, unspecified fracture morphology, initial encounter    5. Trochanteric bursitis of left hip        Reviewed x-rays with the patient, Dr. Acuna also reviewed remotely, discussed diagnosis and treatment options with her she was advised we will place her into a knee brace, she has a walker at home we discussed protected weightbearing and use the brace with walking/activity as well as a walker.   Follow-up in 4 weeks for the right knee with x-rays 2 view.  Patient elected to have bilateral hip bursitis injections which she tolerated well follow-up in 4 weeks with x-rays of the right knee 2 view.    Call or return if worsening symptoms.    Follow Up   Return in 4 weeks (on 5/27/2024).  Patient was given instructions and counseling regarding her condition or for health maintenance advice. Please see specific information pulled into the AVS if appropriate.       EMR Dragon/Transcription disclaimer:  Part of this note may be an electronic transcription/translation of spoken language to printed text using the Dragon Dictation System

## 2024-05-02 ENCOUNTER — TELEPHONE (OUTPATIENT)
Dept: FAMILY MEDICINE CLINIC | Age: 79
End: 2024-05-02
Payer: MEDICARE

## 2024-05-02 NOTE — TELEPHONE ENCOUNTER
Caller: RYLEY VAZQUEZ    Relationship to patient: Emergency Contact    Best call back number: [phone]    Patient is needing: PATIENT IS REQUESTING A TEXT BE SENT REGARDING MEDICATIONS, SHE NEEDS TO KNOW WHICH ONES SHE NEEDS TO STOP TAKING AND THE ONES SHE SHOULD BE TAKING ALONG WITH THE DOSAGE   THEY NEED THE TEXT TO BE ABLE TO HAVE THE NAMES WHERE THEY CAN BE SEEN

## 2024-05-15 DIAGNOSIS — I10 ESSENTIAL (PRIMARY) HYPERTENSION: ICD-10-CM

## 2024-05-15 RX ORDER — NIFEDIPINE 30 MG
30 TABLET, EXTENDED RELEASE ORAL NIGHTLY
Qty: 90 TABLET | Refills: 0 | Status: SHIPPED | OUTPATIENT
Start: 2024-05-15

## 2024-05-16 ENCOUNTER — OFFICE VISIT (OUTPATIENT)
Dept: FAMILY MEDICINE CLINIC | Age: 79
End: 2024-05-16
Payer: MEDICARE

## 2024-05-16 ENCOUNTER — LAB (OUTPATIENT)
Dept: LAB | Facility: HOSPITAL | Age: 79
End: 2024-05-16
Payer: MEDICARE

## 2024-05-16 VITALS
WEIGHT: 131.4 LBS | HEIGHT: 64 IN | BODY MASS INDEX: 22.43 KG/M2 | HEART RATE: 66 BPM | OXYGEN SATURATION: 100 % | DIASTOLIC BLOOD PRESSURE: 77 MMHG | SYSTOLIC BLOOD PRESSURE: 142 MMHG | TEMPERATURE: 98.1 F

## 2024-05-16 DIAGNOSIS — R79.89 ELEVATED SERUM CREATININE: ICD-10-CM

## 2024-05-16 DIAGNOSIS — I10 ESSENTIAL HYPERTENSION: Primary | ICD-10-CM

## 2024-05-16 DIAGNOSIS — I10 ESSENTIAL HYPERTENSION: ICD-10-CM

## 2024-05-16 DIAGNOSIS — R82.81 PYURIA: ICD-10-CM

## 2024-05-16 LAB
ANION GAP SERPL CALCULATED.3IONS-SCNC: 12.4 MMOL/L (ref 5–15)
BACTERIA UR QL AUTO: ABNORMAL /HPF
BILIRUB UR QL STRIP: NEGATIVE
BUN SERPL-MCNC: 20 MG/DL (ref 8–23)
BUN/CREAT SERPL: 14.2 (ref 7–25)
CALCIUM SPEC-SCNC: 9.8 MG/DL (ref 8.6–10.5)
CHLORIDE SERPL-SCNC: 101 MMOL/L (ref 98–107)
CLARITY UR: CLEAR
CO2 SERPL-SCNC: 22.6 MMOL/L (ref 22–29)
COLOR UR: YELLOW
CREAT SERPL-MCNC: 1.41 MG/DL (ref 0.57–1)
EGFRCR SERPLBLD CKD-EPI 2021: 38.5 ML/MIN/1.73
GLUCOSE SERPL-MCNC: 88 MG/DL (ref 65–99)
GLUCOSE UR STRIP-MCNC: NEGATIVE MG/DL
HGB UR QL STRIP.AUTO: NEGATIVE
HYALINE CASTS UR QL AUTO: ABNORMAL /LPF
KETONES UR QL STRIP: NEGATIVE
LEUKOCYTE ESTERASE UR QL STRIP.AUTO: ABNORMAL
NITRITE UR QL STRIP: POSITIVE
PH UR STRIP.AUTO: 5.5 [PH] (ref 5–8)
POTASSIUM SERPL-SCNC: 4.6 MMOL/L (ref 3.5–5.2)
PROT UR QL STRIP: NEGATIVE
RBC # UR STRIP: ABNORMAL /HPF
REF LAB TEST METHOD: ABNORMAL
SODIUM SERPL-SCNC: 136 MMOL/L (ref 136–145)
SP GR UR STRIP: 1.02 (ref 1–1.03)
SQUAMOUS #/AREA URNS HPF: ABNORMAL /HPF
UROBILINOGEN UR QL STRIP: ABNORMAL
WBC # UR STRIP: ABNORMAL /HPF

## 2024-05-16 PROCEDURE — 36415 COLL VENOUS BLD VENIPUNCTURE: CPT

## 2024-05-16 PROCEDURE — 1125F AMNT PAIN NOTED PAIN PRSNT: CPT | Performed by: FAMILY MEDICINE

## 2024-05-16 PROCEDURE — 99213 OFFICE O/P EST LOW 20 MIN: CPT | Performed by: FAMILY MEDICINE

## 2024-05-16 PROCEDURE — 80048 BASIC METABOLIC PNL TOTAL CA: CPT

## 2024-05-16 PROCEDURE — G2211 COMPLEX E/M VISIT ADD ON: HCPCS | Performed by: FAMILY MEDICINE

## 2024-05-16 PROCEDURE — 3078F DIAST BP <80 MM HG: CPT | Performed by: FAMILY MEDICINE

## 2024-05-16 PROCEDURE — 1159F MED LIST DOCD IN RCRD: CPT | Performed by: FAMILY MEDICINE

## 2024-05-16 PROCEDURE — 3077F SYST BP >= 140 MM HG: CPT | Performed by: FAMILY MEDICINE

## 2024-05-16 PROCEDURE — 81001 URINALYSIS AUTO W/SCOPE: CPT

## 2024-05-16 PROCEDURE — 87077 CULTURE AEROBIC IDENTIFY: CPT | Performed by: FAMILY MEDICINE

## 2024-05-16 PROCEDURE — 87186 SC STD MICRODIL/AGAR DIL: CPT | Performed by: FAMILY MEDICINE

## 2024-05-16 PROCEDURE — 87086 URINE CULTURE/COLONY COUNT: CPT | Performed by: FAMILY MEDICINE

## 2024-05-16 PROCEDURE — 1160F RVW MEDS BY RX/DR IN RCRD: CPT | Performed by: FAMILY MEDICINE

## 2024-05-16 NOTE — PROGRESS NOTES
Theresa Davis presents to [facility].    Chief Complaint:  Elevated creatinine, blood pressure    Subjective   History of Present Illness:  Theresa is being seen today for follow-up on her care.  Please see her recent visit dated 4/26/2024.  She was noted at that visit to have a low blood pressure reading.  Her labs showed some elevation of her BUN and creatinine.  After reviewing those, we recommended she stop losartan and losartan/HCTZ.  Theresa does not feel significantly different today.  She continues to struggle emotionally and will be seeing psychiatry relatively soon.    Review of Systems:  Review of Systems   Constitutional:  Negative for chills and fever.   Respiratory:  Negative for cough and shortness of breath.    Cardiovascular:  Negative for chest pain and palpitations.   Gastrointestinal:  Negative for abdominal pain, nausea and vomiting.      Objective   Medical History:  Past Medical History:    Anemia, unspecified    Cervicalgia    Essential (primary) hypertension    Mixed hyperlipidemia    Other long term (current) drug therapy    Other mixed anxiety disorders     Past Surgical History:    APPENDECTOMY    CARPAL TUNNEL RELEASE    COLONOSCOPY    Normal    HYSTERECTOMY    PARTIAL    THORACIC OUTLET SURGERY      Family History   Problem Relation Age of Onset    Breast cancer Mother     Coronary artery disease Father      Social History     Tobacco Use    Smoking status: Never    Smokeless tobacco: Never   Substance Use Topics    Alcohol use: Never       Health Maintenance Due   Topic Date Due    TDAP/TD VACCINES (1 - Tdap) Never done    ZOSTER VACCINE (1 of 2) Never done    RSV Vaccine - Adults (1 - 1-dose 60+ series) Never done    ANNUAL WELLNESS VISIT  08/12/2023    COVID-19 Vaccine (5 - 2023-24 season) 09/01/2023    DXA SCAN  11/09/2023        Immunization History   Administered Date(s) Administered    COVID-19 (PFIZER) BIVALENT 12+YRS 03/23/2023    COVID-19 (PFIZER)  "Purple Cap Monovalent 03/09/2021, 03/30/2021, 12/23/2021    Fluzone High Dose =>65 Years ([facility]) 10/21/2012, 10/10/2013, 10/02/2017    Fluzone High-Dose 65+yrs 01/12/2022, 09/16/2022    Influenza, Unspecified 09/23/2020    Pneumococcal Conjugate 13-Valent (PCV13) 08/12/2015    Pneumococcal Polysaccharide (PPSV23) 08/17/2012, 08/17/2012       Allergies   Allergen Reactions    Penicillins Rash and GI Intolerance    Sulfa Antibiotics Unknown - Low Severity        Medications:  Current Outpatient Medications on File Prior to Visit   Medication Sig    denosumab (PROLIA) 60 MG/ML solution prefilled syringe syringe Inject 1 mL under the skin into the appropriate area as directed Every 6 (Six) Months. T score -2.5, Dx M81.0    folic acid (FOLVITE) 1 MG tablet Take 1 tablet by mouth Daily.    LORazepam (ATIVAN) 0.5 MG tablet Take 0.5-1 tablets by mouth 2 (Two) Times a Day As Needed for Anxiety.    NIFEdipine CC (ADALAT CC) 30 MG 24 hr tablet TAKE 1 TABLET BY MOUTH NIGHTLY    Nutritional Supplements (QUINOA KALE & HEMP PO) As Needed.    sertraline (ZOLOFT) 100 MG tablet Take 1.5 tablets by mouth Daily.    simvastatin (ZOCOR) 20 MG tablet Take 1 tablet by mouth Every Night.     No current facility-administered medications on file prior to visit.       Vital Signs:   Vitals:    05/16/24 1506 05/16/24 1553   BP: 140/83 142/77   BP Location: Right arm Right arm   Patient Position: Sitting Sitting   Pulse: 66 66   Temp: 98.1 °F (36.7 °C)    TempSrc: Oral    SpO2: 100%    Weight: 59.6 kg (131 lb 6.4 oz)    Height: 162.6 cm (64.02\")    Body mass index is 22.54 kg/m².    Physical Exam:  Physical Exam  Vitals reviewed.   Constitutional:       General: She is not in acute distress.     Appearance: She is not ill-appearing.   Eyes:      Pupils: Pupils are equal, round, and reactive to light.   Neck:      Comments: No thyromegaly  Cardiovascular:      Rate and Rhythm: Normal rate and regular rhythm.   Pulmonary:      Effort: " Pulmonary effort is normal.      Breath sounds: Normal breath sounds.   Abdominal:      General: There is no distension.      Palpations: Abdomen is soft.      Tenderness: There is no abdominal tenderness.   Musculoskeletal:      Cervical back: Neck supple.   Lymphadenopathy:      Cervical: No cervical adenopathy.   Skin:     Findings: No lesion or rash.   Neurological:      Mental Status: She is alert.       Result Review   The following data was reviewed by Waylon Tang MD on 05/16/2024.  Lab Results   Component Value Date    WBC 6.90 04/26/2024    HGB 12.1 04/26/2024    HCT 36.1 04/26/2024    MCV 89.4 04/26/2024     04/26/2024     Lab Results   Component Value Date    GLUCOSE 91 04/26/2024    BUN 25 (H) 04/26/2024    CREATININE 1.43 (H) 04/26/2024     (L) 04/26/2024    K 3.5 04/26/2024    CL 96 (L) 04/26/2024    CO2 26.4 04/26/2024    CALCIUM 10.0 04/26/2024    PROTEINTOT 7.3 04/26/2024    ALBUMIN 4.3 04/26/2024    ALT 10 04/26/2024    AST 16 04/26/2024    ALKPHOS 94 04/26/2024    BILITOT 0.3 04/26/2024    EGFR 38.1 (L) 04/26/2024    GLOB 3.0 04/26/2024    AGRATIO 1.4 04/26/2024    BCR 17.5 04/26/2024    ANIONGAP 12.6 04/26/2024      Lab Results   Component Value Date    CHOL 177 04/26/2024    CHLPL 190 10/22/2020    TRIG 106 04/26/2024    HDL 59 04/26/2024    LDL 99 04/26/2024     Lab Results   Component Value Date    TSH 2.810 04/26/2024     Lab Results   Component Value Date    HGBA1C 5.30 03/23/2023     BMI is within normal parameters. No other follow-up for BMI required.        Assessment and Plan:   Today, we have reviewed her care.  Her blood pressure is borderline here.  It certainly is better than it was on the last check though.  For the immediate near term, I have recommended she stay off both medications.  We will recheck her chemistry and urinalysis today.  No other short-term change is anticipated.  It remains a question if and when to resume blood pressure medication and what  kind.  We will see what the testing shows and then reach back out to her probably tomorrow with additional guidance.    Diagnoses and all orders for this visit:    1. Essential hypertension (Primary)  -     Basic Metabolic Panel; Future  -     Urinalysis With Microscopic - Urine, Clean Catch; Future    2. Elevated serum creatinine  -     Basic Metabolic Panel; Future  -     Urinalysis With Microscopic - Urine, Clean Catch; Future    Follow Up  Return in about 5 months (around 10/26/2024) for Recheck, Medicare Wellness.  Patient was given instructions and counseling regarding her condition or for health maintenance advice. Please see specific information pulled into the AVS if appropriate.

## 2024-05-17 RX ORDER — CEFDINIR 300 MG/1
300 CAPSULE ORAL 2 TIMES DAILY
Qty: 14 CAPSULE | Refills: 0 | Status: SHIPPED | OUTPATIENT
Start: 2024-05-17

## 2024-05-19 LAB — BACTERIA SPEC AEROBE CULT: ABNORMAL

## 2024-05-22 DIAGNOSIS — N18.32 STAGE 3B CHRONIC KIDNEY DISEASE: ICD-10-CM

## 2024-05-22 DIAGNOSIS — I10 ESSENTIAL HYPERTENSION: Primary | ICD-10-CM

## 2024-06-11 ENCOUNTER — OFFICE VISIT (OUTPATIENT)
Dept: ORTHOPEDIC SURGERY | Facility: CLINIC | Age: 79
End: 2024-06-11
Payer: MEDICARE

## 2024-06-11 VITALS
OXYGEN SATURATION: 98 % | DIASTOLIC BLOOD PRESSURE: 85 MMHG | BODY MASS INDEX: 22.36 KG/M2 | HEIGHT: 64 IN | HEART RATE: 66 BPM | WEIGHT: 131 LBS | SYSTOLIC BLOOD PRESSURE: 152 MMHG

## 2024-06-11 DIAGNOSIS — M25.561 RIGHT KNEE PAIN, UNSPECIFIED CHRONICITY: Primary | ICD-10-CM

## 2024-06-11 DIAGNOSIS — S82.001D CLOSED NONDISPLACED FRACTURE OF RIGHT PATELLA WITH ROUTINE HEALING, UNSPECIFIED FRACTURE MORPHOLOGY, SUBSEQUENT ENCOUNTER: ICD-10-CM

## 2024-06-11 DIAGNOSIS — M17.11 ARTHRITIS OF KNEE, RIGHT: ICD-10-CM

## 2024-06-11 PROCEDURE — 3079F DIAST BP 80-89 MM HG: CPT | Performed by: PHYSICIAN ASSISTANT

## 2024-06-11 PROCEDURE — 3077F SYST BP >= 140 MM HG: CPT | Performed by: PHYSICIAN ASSISTANT

## 2024-06-11 PROCEDURE — 99213 OFFICE O/P EST LOW 20 MIN: CPT | Performed by: PHYSICIAN ASSISTANT

## 2024-06-11 PROCEDURE — 1159F MED LIST DOCD IN RCRD: CPT | Performed by: PHYSICIAN ASSISTANT

## 2024-06-11 PROCEDURE — 1160F RVW MEDS BY RX/DR IN RCRD: CPT | Performed by: PHYSICIAN ASSISTANT

## 2024-06-11 NOTE — PROGRESS NOTES
"Chief Complaint  Follow-up of the Right Knee    Subjective          History of Present Illness      Theresa Davis is a 79 y.o. female  presents to [facility] for     Patient presents with her  for follow-up evaluation of bilateral hip bursitis and right patella fracture.  She was seen on 4/29/2024 for her hips but also she complained of a fall that she had to the knee she was found to have a patella fracture she was advised to use a walker and use of brace she states she has been using the walker and brace but she presents without a walker or brace in clinic today.  She states the pain in her hips did not improve with her injections she states she cannot sleep on her side she states her knee is working better and less painful.      Allergies   Allergen Reactions    Penicillins Rash and GI Intolerance    Sulfa Antibiotics Unknown - Low Severity        Social History     Socioeconomic History    Marital status:     Number of children: 2   Tobacco Use    Smoking status: Never    Smokeless tobacco: Never   Vaping Use    Vaping status: Never Used   Substance and Sexual Activity    Alcohol use: Never    Drug use: Never    Sexual activity: Defer        REVIEW OF SYSTEMS    Constitutional: Awake alert and oriented x3, no acute distress, denies fevers, chills, weight loss  Respiratory: No respiratory distress  Vascular: Brisk cap refill, Intact distal pulses, No cyanosis, compartments soft with no signs or symptoms of compartment syndrome or DVT.   Cardiovascular: Denies chest pain, shortness of breath  Skin: Denies rashes, acute skin changes  Neurologic: Denies headache, loss of consciousness  MSK: Right knee pain      Objective   Vital Signs:   /85   Pulse 66   Ht 162.6 cm (64.02\")   Wt 59.4 kg (131 lb)   SpO2 98%   BMI 22.47 kg/m²     Body mass index is 22.47 kg/m².    Physical Exam       Right knee: Skin is intact, no erythema, no ecchymosis, no swelling, no effusion, " no signs of infection, full extension, flexion 120, stable anterior/posterior drawer, stable to varus/valgus stress.  Nontender to palpation, no pain with range of motion. Negative Bridget, Negative Lachman.      Procedures    Imaging Results (Most Recent)       Procedure Component Value Units Date/Time    XR Knee 1 or 2 View Right [[id number]] Resulted: 06/11/24 1707     Updated: 06/11/24 1708    Narrative:      View:AP/Lateral view(s)  Site: Right knee  Indication: Right knee pain  Study: X-rays ordered, taken in the office, and reviewed today  X-ray: Healing nondisplaced fracture to the lateral patella, no increased   displacement or angulation compared to previous studies, advanced   degenerative changes to the medial joint space and osteophyte formation   postoperative changes of intramedullary nail of the hip  Comparative data: Previous studies             Result Review :   The following data was reviewed by: NATA Enrique on 06/11/2024:  Data reviewed : Radiologic studies reviewed by me with the patient              Assessment and Plan    Diagnoses and all orders for this visit:    1. Right knee pain, unspecified chronicity (Primary)  -     XR Knee 1 or 2 View Right    2. Arthritis of knee, right  -     XR Knee 1 or 2 View Right    3. Closed nondisplaced fracture of right patella with routine healing, unspecified fracture morphology, subsequent encounter  -     XR Knee 1 or 2 View Right        Reviewed x-rays with the patient advised her to continue brace use, may discontinue walker, continue weightbearing with brace use, avoid strenuous activities follow-up in 6 weeks for recheck with x-rays    Call or return if worsening symptoms.    Follow Up   Return in about 6 weeks (around 7/23/2024) for Recheck.  Patient was given instructions and counseling regarding her condition or for health maintenance advice. Please see specific information pulled into the AVS if appropriate.       EMR  Dragon/Transcription disclaimer:  Part of this note may be an electronic transcription/translation of spoken language to printed text using the Dragon Dictation System

## 2024-06-19 ENCOUNTER — TELEPHONE (OUTPATIENT)
Dept: FAMILY MEDICINE CLINIC | Age: 79
End: 2024-06-19
Payer: MEDICARE

## 2024-06-19 NOTE — TELEPHONE ENCOUNTER
----- Message from Carmencita CHEN sent at 5/22/2024 11:43 AM EDT -----  TICKLE for blood pressure check again in 4 weeks

## 2024-07-10 DIAGNOSIS — Z79.899 OTHER LONG TERM (CURRENT) DRUG THERAPY: ICD-10-CM

## 2024-07-10 DIAGNOSIS — R82.81 PYURIA: ICD-10-CM

## 2024-07-10 DIAGNOSIS — F41.1 GENERALIZED ANXIETY DISORDER: ICD-10-CM

## 2024-07-11 RX ORDER — CEFDINIR 300 MG/1
CAPSULE ORAL
Qty: 14 CAPSULE | Refills: 0 | OUTPATIENT
Start: 2024-07-11

## 2024-07-11 RX ORDER — LORAZEPAM 0.5 MG/1
.25-.5 TABLET ORAL 2 TIMES DAILY PRN
Qty: 20 TABLET | Refills: 0 | Status: SHIPPED | OUTPATIENT
Start: 2024-07-11

## 2024-07-11 NOTE — TELEPHONE ENCOUNTER
I did send a partial refill of this medication for her just now.  We had placed a psychiatry referral.  It looks like she missed the scheduled appointment.  Confirm that she is not having thoughts about harming herself.  Would she like us to move ahead with rescheduling the psychiatry visit?  Thanks.

## 2024-07-30 ENCOUNTER — OFFICE VISIT (OUTPATIENT)
Dept: ORTHOPEDIC SURGERY | Facility: CLINIC | Age: 79
End: 2024-07-30
Payer: MEDICARE

## 2024-07-30 VITALS
DIASTOLIC BLOOD PRESSURE: 87 MMHG | WEIGHT: 131 LBS | BODY MASS INDEX: 22.36 KG/M2 | OXYGEN SATURATION: 91 % | SYSTOLIC BLOOD PRESSURE: 144 MMHG | HEART RATE: 79 BPM | HEIGHT: 64 IN

## 2024-07-30 DIAGNOSIS — M70.62 TROCHANTERIC BURSITIS OF LEFT HIP: ICD-10-CM

## 2024-07-30 DIAGNOSIS — M25.561 RIGHT KNEE PAIN, UNSPECIFIED CHRONICITY: Primary | ICD-10-CM

## 2024-07-30 DIAGNOSIS — M70.71 BURSITIS OF OTHER BURSA OF RIGHT HIP: ICD-10-CM

## 2024-07-30 DIAGNOSIS — S82.001D CLOSED NONDISPLACED FRACTURE OF RIGHT PATELLA WITH ROUTINE HEALING, UNSPECIFIED FRACTURE MORPHOLOGY, SUBSEQUENT ENCOUNTER: ICD-10-CM

## 2024-07-30 PROCEDURE — 3077F SYST BP >= 140 MM HG: CPT | Performed by: PHYSICIAN ASSISTANT

## 2024-07-30 PROCEDURE — 3079F DIAST BP 80-89 MM HG: CPT | Performed by: PHYSICIAN ASSISTANT

## 2024-07-30 PROCEDURE — 99213 OFFICE O/P EST LOW 20 MIN: CPT | Performed by: PHYSICIAN ASSISTANT

## 2024-07-30 PROCEDURE — 20610 DRAIN/INJ JOINT/BURSA W/O US: CPT | Performed by: PHYSICIAN ASSISTANT

## 2024-07-30 PROCEDURE — 1160F RVW MEDS BY RX/DR IN RCRD: CPT | Performed by: PHYSICIAN ASSISTANT

## 2024-07-30 PROCEDURE — 1159F MED LIST DOCD IN RCRD: CPT | Performed by: PHYSICIAN ASSISTANT

## 2024-07-30 RX ORDER — TRIAMCINOLONE ACETONIDE 40 MG/ML
40 INJECTION, SUSPENSION INTRA-ARTICULAR; INTRAMUSCULAR
Status: COMPLETED | OUTPATIENT
Start: 2024-07-30 | End: 2024-07-30

## 2024-07-30 RX ORDER — LIDOCAINE HYDROCHLORIDE 10 MG/ML
5 INJECTION, SOLUTION INFILTRATION; PERINEURAL
Status: COMPLETED | OUTPATIENT
Start: 2024-07-30 | End: 2024-07-30

## 2024-07-30 RX ADMIN — TRIAMCINOLONE ACETONIDE 40 MG: 40 INJECTION, SUSPENSION INTRA-ARTICULAR; INTRAMUSCULAR at 17:03

## 2024-07-30 RX ADMIN — TRIAMCINOLONE ACETONIDE 40 MG: 40 INJECTION, SUSPENSION INTRA-ARTICULAR; INTRAMUSCULAR at 17:04

## 2024-07-30 RX ADMIN — LIDOCAINE HYDROCHLORIDE 5 ML: 10 INJECTION, SOLUTION INFILTRATION; PERINEURAL at 17:04

## 2024-07-30 RX ADMIN — LIDOCAINE HYDROCHLORIDE 5 ML: 10 INJECTION, SOLUTION INFILTRATION; PERINEURAL at 17:03

## 2024-07-30 NOTE — PROGRESS NOTES
"Chief Complaint  Follow-up of the Left Hip, Follow-up of the Right Hip, and Follow-up of the Right Knee    Subjective          Follow-up        Theresa Davis is a 79 y.o. female  presents to [facility] for     Patient presents for follow-up evaluation of bilateral hip pain and bilateral hip bursitis and for right patella fracture.  Patient has been treating her fracture conservatively she states she stopped wearing the brace she has been using her knee for activities without trouble.  She denies new injury or symptoms of pain to the knee she states she has good range of motion and strength.  She does state that her hips are causing her pain she points to the lateral hip bilaterally as areas of pain she is requesting bilateral hip bursitis injections which she states have helped in the past      Allergies   Allergen Reactions    Penicillins Rash and GI Intolerance    Sulfa Antibiotics Unknown - Low Severity        Social History     Socioeconomic History    Marital status:     Number of children: 2   Tobacco Use    Smoking status: Never    Smokeless tobacco: Never   Vaping Use    Vaping status: Never Used   Substance and Sexual Activity    Alcohol use: Never    Drug use: Never    Sexual activity: Defer        REVIEW OF SYSTEMS    Constitutional: Awake alert and oriented x3, no acute distress, denies fevers, chills, weight loss  Respiratory: No respiratory distress  Vascular: Brisk cap refill, Intact distal pulses, No cyanosis, compartments soft with no signs or symptoms of compartment syndrome or DVT.   Cardiovascular: Denies chest pain, shortness of breath  Skin: Denies rashes, acute skin changes  Neurologic: Denies headache, loss of consciousness  MSK: Bilateral hip pain, right knee pain    Objective   Vital Signs:   /87   Pulse 79   Ht 162.6 cm (64.02\")   Wt 59.4 kg (131 lb)   SpO2 91%   BMI 22.47 kg/m²     Body mass index is 22.47 kg/m².    Physical Exam       Right " knee: Skin is intact, no erythema, no ecchymosis, no swelling or effusion, no signs of infection, full extension flexion 120, stable anterior/posterior drawer stable to varus/valgus stress, tenderness to palpation of the anterior lateral knee in the area of the fracture site, mild pain with range of motion, patient able hold straight leg raise.  Bilateral hip: Tender to palpation of lateral hip over the greater trochanter, flexion 100, external rotation 35 internal rotation 20, pain with straight leg raise.         Large Joint: R greater trochanteric bursa  Date/Time: 7/30/2024 5:03 PM  Consent given by: patient  Site marked: site marked  Timeout: Immediately prior to procedure a time out was called to verify the correct patient, procedure, equipment, support staff and site/side marked as required   Supporting Documentation  Indications: pain   Procedure Details  Location: hip - R greater trochanteric bursa  Preparation: Patient was prepped and draped in the usual sterile fashion  Needle size: 22 G (Spinal)  Medications administered: 5 mL lidocaine 1 %; 40 mg triamcinolone acetonide 40 MG/ML  Patient tolerance: patient tolerated the procedure well with no immediate complications      Large Joint: L greater trochanteric bursa  Date/Time: 7/30/2024 5:04 PM  Consent given by: patient  Site marked: site marked  Timeout: Immediately prior to procedure a time out was called to verify the correct patient, procedure, equipment, support staff and site/side marked as required   Supporting Documentation  Indications: pain   Procedure Details  Location: hip - L greater trochanteric bursa  Preparation: Patient was prepped and draped in the usual sterile fashion  Needle size: 22 G (Spinal)  Medications administered: 5 mL lidocaine 1 %; 40 mg triamcinolone acetonide 40 MG/ML  Patient tolerance: patient tolerated the procedure well with no immediate complications      This injection documentation was Scribed for Brad Newton  NATA Britton by Alecia King MA.  07/30/24   17:04 EDT    Imaging Results (Most Recent)       Procedure Component Value Units Date/Time    XR Knee 1 or 2 View Right [[id number]] Resulted: 07/30/24 1717     Updated: 07/30/24 1717    Narrative:      View:AP/Lateral view(s)  Site: Right knee  Indication: Right knee pain  Study: X-rays ordered, taken in the office, and reviewed today  X-ray: Good healing of the lateral patella fracture fracture line remains   stable compared to previous studies  Comparative data: Previous studies             Result Review :   The following data was reviewed by: NATA Enrique on 07/30/2024:  Data reviewed : Radiologic studies reviewed by me with the patient              Assessment and Plan    Diagnoses and all orders for this visit:    1. Right knee pain, unspecified chronicity (Primary)    2. Closed nondisplaced fracture of right patella with routine healing, unspecified fracture morphology, subsequent encounter  -     XR Knee 1 or 2 View Right    3. Bursitis of other bursa of right hip    4. Trochanteric bursitis of left hip    Other orders  -     Large Joint: R greater trochanteric bursa  -     Large Joint: L greater trochanteric bursa        Reviewed x-rays with the patient, discussed diagnosis and treatment options with her and her  patient will continue activity as tolerated for her right knee follow-up as needed for the knee.  She elected to have bilateral hip bursitis injections which she tolerated well follow-up in 3 months for her hips    Call or return if worsening symptoms.    Follow Up   Return in about 3 months (around 10/30/2024), or FOR THE KNEE.  Patient was given instructions and counseling regarding her condition or for health maintenance advice. Please see specific information pulled into the AVS if appropriate.       EMR Dragon/Transcription disclaimer:  Part of this note may be an electronic transcription/translation of spoken language to  printed text using the Dragon Dictation System

## 2024-08-18 ENCOUNTER — TELEPHONE (OUTPATIENT)
Dept: FAMILY MEDICINE CLINIC | Age: 79
End: 2024-08-18
Payer: MEDICARE

## 2024-08-18 NOTE — TELEPHONE ENCOUNTER
Please reach out to Theresa's , Lex.  She missed a psychiatry appointment.  I would still lean towards evaluation by psychiatry.  Would they like us to make referral again to psychiatry here or to another facility?  Thanks.

## 2024-09-04 ENCOUNTER — EXTERNAL PBMM DATA (OUTPATIENT)
Dept: PHARMACY | Facility: OTHER | Age: 79
End: 2024-09-04
Payer: MEDICARE

## 2024-11-15 DIAGNOSIS — I10 ESSENTIAL (PRIMARY) HYPERTENSION: ICD-10-CM

## 2024-11-15 DIAGNOSIS — Z79.899 OTHER LONG TERM (CURRENT) DRUG THERAPY: ICD-10-CM

## 2024-11-15 DIAGNOSIS — F41.1 GENERALIZED ANXIETY DISORDER: ICD-10-CM

## 2024-11-18 RX ORDER — LORAZEPAM 0.5 MG/1
TABLET ORAL
Qty: 20 TABLET | Refills: 0 | OUTPATIENT
Start: 2024-11-18

## 2024-11-18 RX ORDER — NIFEDIPINE 30 MG
30 TABLET, EXTENDED RELEASE ORAL NIGHTLY
Qty: 90 TABLET | Refills: 0 | OUTPATIENT
Start: 2024-11-18

## 2024-11-18 RX ORDER — SERTRALINE HYDROCHLORIDE 100 MG/1
150 TABLET, FILM COATED ORAL DAILY
Qty: 135 TABLET | Refills: 0 | OUTPATIENT
Start: 2024-11-18

## 2024-11-18 NOTE — TELEPHONE ENCOUNTER
It looks like she has an appointment to see me tomorrow.  Confirm if she will have enough of these medications to last until then.  If so, my preference would be to refill them at the office visit.  Thanks.

## 2024-11-18 NOTE — TELEPHONE ENCOUNTER
LV:5/16/24  LF:7/11/24 #20    Pt no showed 11/14/24 appointment, has not rescheduled with psych yet.

## 2024-11-19 ENCOUNTER — LAB (OUTPATIENT)
Dept: LAB | Facility: HOSPITAL | Age: 79
End: 2024-11-19
Payer: MEDICARE

## 2024-11-19 ENCOUNTER — OFFICE VISIT (OUTPATIENT)
Dept: FAMILY MEDICINE CLINIC | Age: 79
End: 2024-11-19
Payer: MEDICARE

## 2024-11-19 VITALS
OXYGEN SATURATION: 98 % | HEART RATE: 55 BPM | SYSTOLIC BLOOD PRESSURE: 125 MMHG | DIASTOLIC BLOOD PRESSURE: 60 MMHG | WEIGHT: 124 LBS | HEIGHT: 64 IN | BODY MASS INDEX: 21.17 KG/M2 | TEMPERATURE: 98.2 F

## 2024-11-19 DIAGNOSIS — E78.2 MIXED HYPERLIPIDEMIA: ICD-10-CM

## 2024-11-19 DIAGNOSIS — I10 ESSENTIAL HYPERTENSION: ICD-10-CM

## 2024-11-19 DIAGNOSIS — E53.8 FOLIC ACID DEFICIENCY: ICD-10-CM

## 2024-11-19 DIAGNOSIS — N18.32 STAGE 3B CHRONIC KIDNEY DISEASE: ICD-10-CM

## 2024-11-19 DIAGNOSIS — I10 ESSENTIAL HYPERTENSION: Primary | ICD-10-CM

## 2024-11-19 DIAGNOSIS — I45.10 RIGHT BUNDLE BRANCH BLOCK: ICD-10-CM

## 2024-11-19 DIAGNOSIS — R30.0 DYSURIA: ICD-10-CM

## 2024-11-19 DIAGNOSIS — R07.9 CHEST PAIN, UNSPECIFIED TYPE: ICD-10-CM

## 2024-11-19 DIAGNOSIS — Z79.899 OTHER LONG TERM (CURRENT) DRUG THERAPY: ICD-10-CM

## 2024-11-19 DIAGNOSIS — I10 ESSENTIAL (PRIMARY) HYPERTENSION: ICD-10-CM

## 2024-11-19 DIAGNOSIS — Z23 ENCOUNTER FOR IMMUNIZATION: ICD-10-CM

## 2024-11-19 DIAGNOSIS — I44.4 LEFT ANTERIOR FASCICULAR BLOCK: ICD-10-CM

## 2024-11-19 DIAGNOSIS — F41.1 GENERALIZED ANXIETY DISORDER: ICD-10-CM

## 2024-11-19 LAB
ALBUMIN SERPL-MCNC: 4.3 G/DL (ref 3.5–5.2)
ALBUMIN/GLOB SERPL: 1.3 G/DL
ALP SERPL-CCNC: 91 U/L (ref 39–117)
ALT SERPL W P-5'-P-CCNC: 13 U/L (ref 1–33)
ANION GAP SERPL CALCULATED.3IONS-SCNC: 9.8 MMOL/L (ref 5–15)
AST SERPL-CCNC: 19 U/L (ref 1–32)
BACTERIA UR QL AUTO: ABNORMAL /HPF
BILIRUB SERPL-MCNC: 0.2 MG/DL (ref 0–1.2)
BILIRUB UR QL STRIP: NEGATIVE
BUN SERPL-MCNC: 17 MG/DL (ref 8–23)
BUN/CREAT SERPL: 12.1 (ref 7–25)
CALCIUM SPEC-SCNC: 9.6 MG/DL (ref 8.6–10.5)
CHLORIDE SERPL-SCNC: 103 MMOL/L (ref 98–107)
CLARITY UR: ABNORMAL
CO2 SERPL-SCNC: 25.2 MMOL/L (ref 22–29)
COLOR UR: YELLOW
CREAT SERPL-MCNC: 1.41 MG/DL (ref 0.57–1)
DEPRECATED RDW RBC AUTO: 41.8 FL (ref 37–54)
EGFRCR SERPLBLD CKD-EPI 2021: 38.5 ML/MIN/1.73
ERYTHROCYTE [DISTWIDTH] IN BLOOD BY AUTOMATED COUNT: 12.2 % (ref 12.3–15.4)
GLOBULIN UR ELPH-MCNC: 3.2 GM/DL
GLUCOSE SERPL-MCNC: 91 MG/DL (ref 65–99)
GLUCOSE UR STRIP-MCNC: NEGATIVE MG/DL
HCT VFR BLD AUTO: 36.3 % (ref 34–46.6)
HGB BLD-MCNC: 12.1 G/DL (ref 12–15.9)
HGB UR QL STRIP.AUTO: NEGATIVE
KETONES UR QL STRIP: ABNORMAL
LEUKOCYTE ESTERASE UR QL STRIP.AUTO: ABNORMAL
MCH RBC QN AUTO: 30.8 PG (ref 26.6–33)
MCHC RBC AUTO-ENTMCNC: 33.3 G/DL (ref 31.5–35.7)
MCV RBC AUTO: 92.4 FL (ref 79–97)
NITRITE UR QL STRIP: POSITIVE
PH UR STRIP.AUTO: 5.5 [PH] (ref 5–8)
PLATELET # BLD AUTO: 332 10*3/MM3 (ref 140–450)
PMV BLD AUTO: 8.7 FL (ref 6–12)
POTASSIUM SERPL-SCNC: 4 MMOL/L (ref 3.5–5.2)
PROT SERPL-MCNC: 7.5 G/DL (ref 6–8.5)
PROT UR QL STRIP: NEGATIVE
RBC # BLD AUTO: 3.93 10*6/MM3 (ref 3.77–5.28)
RBC # UR STRIP: ABNORMAL /HPF
REF LAB TEST METHOD: ABNORMAL
SODIUM SERPL-SCNC: 138 MMOL/L (ref 136–145)
SP GR UR STRIP: >=1.03 (ref 1–1.03)
SQUAMOUS #/AREA URNS HPF: ABNORMAL /HPF
UROBILINOGEN UR QL STRIP: ABNORMAL
WBC # UR STRIP: ABNORMAL /HPF
WBC NRBC COR # BLD AUTO: 7.68 10*3/MM3 (ref 3.4–10.8)

## 2024-11-19 PROCEDURE — 1159F MED LIST DOCD IN RCRD: CPT | Performed by: FAMILY MEDICINE

## 2024-11-19 PROCEDURE — 93000 ELECTROCARDIOGRAM COMPLETE: CPT | Performed by: FAMILY MEDICINE

## 2024-11-19 PROCEDURE — 87077 CULTURE AEROBIC IDENTIFY: CPT

## 2024-11-19 PROCEDURE — 90480 ADMN SARSCOV2 VAC 1/ONLY CMP: CPT | Performed by: FAMILY MEDICINE

## 2024-11-19 PROCEDURE — 90662 IIV NO PRSV INCREASED AG IM: CPT | Performed by: FAMILY MEDICINE

## 2024-11-19 PROCEDURE — 85027 COMPLETE CBC AUTOMATED: CPT

## 2024-11-19 PROCEDURE — 3078F DIAST BP <80 MM HG: CPT | Performed by: FAMILY MEDICINE

## 2024-11-19 PROCEDURE — 36415 COLL VENOUS BLD VENIPUNCTURE: CPT

## 2024-11-19 PROCEDURE — 91320 SARSCV2 VAC 30MCG TRS-SUC IM: CPT | Performed by: FAMILY MEDICINE

## 2024-11-19 PROCEDURE — 87186 SC STD MICRODIL/AGAR DIL: CPT

## 2024-11-19 PROCEDURE — 3074F SYST BP LT 130 MM HG: CPT | Performed by: FAMILY MEDICINE

## 2024-11-19 PROCEDURE — 80053 COMPREHEN METABOLIC PANEL: CPT

## 2024-11-19 PROCEDURE — 82746 ASSAY OF FOLIC ACID SERUM: CPT

## 2024-11-19 PROCEDURE — 82607 VITAMIN B-12: CPT

## 2024-11-19 PROCEDURE — G0008 ADMIN INFLUENZA VIRUS VAC: HCPCS | Performed by: FAMILY MEDICINE

## 2024-11-19 PROCEDURE — 1125F AMNT PAIN NOTED PAIN PRSNT: CPT | Performed by: FAMILY MEDICINE

## 2024-11-19 PROCEDURE — 81001 URINALYSIS AUTO W/SCOPE: CPT

## 2024-11-19 PROCEDURE — 1160F RVW MEDS BY RX/DR IN RCRD: CPT | Performed by: FAMILY MEDICINE

## 2024-11-19 PROCEDURE — 87086 URINE CULTURE/COLONY COUNT: CPT

## 2024-11-19 RX ORDER — FOLIC ACID 1 MG/1
1 TABLET ORAL DAILY
Qty: 90 TABLET | Refills: 3 | Status: SHIPPED | OUTPATIENT
Start: 2024-11-19

## 2024-11-19 RX ORDER — SERTRALINE HYDROCHLORIDE 100 MG/1
100 TABLET, FILM COATED ORAL DAILY
Qty: 90 TABLET | Refills: 3 | Status: SHIPPED | OUTPATIENT
Start: 2024-11-19

## 2024-11-19 RX ORDER — SIMVASTATIN 20 MG
20 TABLET ORAL NIGHTLY
Qty: 90 TABLET | Refills: 3 | Status: SHIPPED | OUTPATIENT
Start: 2024-11-19

## 2024-11-19 RX ORDER — NIFEDIPINE 30 MG
30 TABLET, EXTENDED RELEASE ORAL NIGHTLY
Qty: 90 TABLET | Refills: 0 | Status: SHIPPED | OUTPATIENT
Start: 2024-11-19

## 2024-11-19 RX ORDER — LORAZEPAM 0.5 MG/1
.25-.5 TABLET ORAL 2 TIMES DAILY PRN
Qty: 20 TABLET | Refills: 0 | Status: SHIPPED | OUTPATIENT
Start: 2024-11-19

## 2024-11-19 NOTE — PROGRESS NOTES
Theresa Davis presents to [facility].    Chief Complaint:  Blood pressure, cholesterol, anxiety    Subjective   History of Present Illness:  Theresa is being seen today for she currently takes nifedipine.  Her blood pressure is just above goal on initial check.  She does not routinely check it at home.  She also remains on cholesterol-lowering medication in the form of simvastatin.  She is not aware of obvious side effects from medications.    She also has chronic anxiety for which she currently takes generic Zoloft.  This has been an ongoing problem for her.  We have previously discussed referral to psychiatry, but she did not keep scheduled appointments with them.  She denies any intention to harm herself currently.  She would be interested in possibly being scheduled again to see psychiatry.  She also takes lorazepam on a sparing basis.  This has been of benefit for her, and she would like to refill it.    Review of Systems:  Review of Systems   Constitutional:  Negative for chills and fever.   Respiratory:  Negative for cough and shortness of breath.    Cardiovascular:  Positive for chest pain. Negative for palpitations.   Gastrointestinal:  Negative for abdominal pain, nausea and vomiting.      Objective   Medical History:  Past Medical History:    Anemia, unspecified    Cervicalgia    Essential (primary) hypertension    Mixed hyperlipidemia    Other long term (current) drug therapy    Other mixed anxiety disorders     Past Surgical History:    APPENDECTOMY    CARPAL TUNNEL RELEASE    COLONOSCOPY    Normal    HYSTERECTOMY    PARTIAL    THORACIC OUTLET SURGERY      Family History   Problem Relation Age of Onset    Breast cancer Mother     Coronary artery disease Father      Social History     Tobacco Use    Smoking status: Never    Smokeless tobacco: Never   Substance Use Topics    Alcohol use: Never       Health Maintenance Due   Topic Date Due    TDAP/TD VACCINES (1 - Tdap) Never  "done    ZOSTER VACCINE (1 of 2) Never done    RSV Vaccine - Adults (1 - 1-dose 75+ series) Never done    DXA SCAN  11/09/2023    INFLUENZA VACCINE  08/01/2024    COVID-19 Vaccine (5 - 2024-25 season) 09/01/2024        Immunization History   Administered Date(s) Administered    COVID-19 (PFIZER) BIVALENT 12+YRS 03/23/2023    COVID-19 (PFIZER) Purple Cap Monovalent 03/09/2021, 03/30/2021, 12/23/2021    Fluzone High-Dose 65+YRS 10/21/2012, 10/10/2013, 10/02/2017    Fluzone High-Dose 65+yrs 01/12/2022, 09/16/2022    Influenza, Unspecified 09/23/2020    Pneumococcal Conjugate 13-Valent (PCV13) 08/12/2015    Pneumococcal Polysaccharide (PPSV23) 08/17/2012, 08/17/2012       Allergies   Allergen Reactions    Penicillins Rash and GI Intolerance    Sulfa Antibiotics Unknown - Low Severity        Medications:    Current Outpatient Medications:     folic acid (FOLVITE) 1 MG tablet, Take 1 tablet by mouth Daily., Disp: 90 tablet, Rfl: 3    LORazepam (ATIVAN) 0.5 MG tablet, Take 0.5-1 tablets by mouth 2 (Two) Times a Day As Needed for Anxiety., Disp: 20 tablet, Rfl: 0    NIFEdipine CC (ADALAT CC) 30 MG 24 hr tablet, Take 1 tablet by mouth Every Night., Disp: 90 tablet, Rfl: 0    sertraline (ZOLOFT) 100 MG tablet, Take 1 tablet by mouth Daily., Disp: 90 tablet, Rfl: 3    simvastatin (ZOCOR) 20 MG tablet, Take 1 tablet by mouth Every Night., Disp: 90 tablet, Rfl: 3    denosumab (PROLIA) 60 MG/ML solution prefilled syringe syringe, Inject 1 mL under the skin into the appropriate area as directed Every 6 (Six) Months. T score -2.5, Dx M81.0, Disp: 1 mL, Rfl: 2    Vital Signs:   Vitals:    11/19/24 1435 11/19/24 1503   BP: 145/64 125/60   BP Location: Left arm Left arm   Patient Position: Sitting Sitting   Pulse: 72 55   Temp: 98.2 °F (36.8 °C)    TempSrc: Oral    SpO2: 98%    Weight: 56.2 kg (124 lb)    Height: 162.6 cm (64.02\")    Body mass index is 21.27 kg/m².    Physical Exam:  Physical Exam  Vitals reviewed.   Constitutional:  "      General: She is not in acute distress.     Appearance: She is not ill-appearing.   Eyes:      Pupils: Pupils are equal, round, and reactive to light.   Neck:      Comments: No thyromegaly  Cardiovascular:      Rate and Rhythm: Normal rate and regular rhythm.   Pulmonary:      Effort: Pulmonary effort is normal.      Breath sounds: Normal breath sounds.   Abdominal:      General: There is no distension.      Palpations: Abdomen is soft.      Tenderness: There is no abdominal tenderness.   Musculoskeletal:      Cervical back: Neck supple.   Lymphadenopathy:      Cervical: No cervical adenopathy.   Skin:     Findings: No lesion or rash.   Neurological:      General: No focal deficit present.      Mental Status: She is alert.      Cranial Nerves: No cranial nerve deficit.      Motor: No weakness.   Psychiatric:         Mood and Affect: Mood normal.     Result Review   The following data was reviewed by Waylon Tang MD on 11/19/2024.  Lab Results   Component Value Date    WBC 6.90 04/26/2024    HGB 12.1 04/26/2024    HCT 36.1 04/26/2024    MCV 89.4 04/26/2024     04/26/2024     Lab Results   Component Value Date    GLUCOSE 88 05/16/2024    BUN 20 05/16/2024    CREATININE 1.41 (H) 05/16/2024     05/16/2024    K 4.6 05/16/2024     05/16/2024    CALCIUM 9.8 05/16/2024    PROTEINTOT 7.3 04/26/2024    ALBUMIN 4.3 04/26/2024    ALT 10 04/26/2024    AST 16 04/26/2024    ALKPHOS 94 04/26/2024    BILITOT 0.3 04/26/2024    GLOB 3.0 04/26/2024    AGRATIO 1.4 04/26/2024    BCR 14.2 05/16/2024    ANIONGAP 12.4 05/16/2024    EGFR 38.5 (L) 05/16/2024     Lab Results   Component Value Date    CHOL 177 04/26/2024    CHLPL 190 10/22/2020    TRIG 106 04/26/2024    HDL 59 04/26/2024    LDL 99 04/26/2024     Lab Results   Component Value Date    TSH 2.810 04/26/2024     Lab Results   Component Value Date    HGBA1C 5.30 03/23/2023     BMI is within normal parameters. No other follow-up for BMI  required.    ECG 12 Lead    Date/Time: 11/19/2024 3:07 PM  Performed by: Waylon Tang MD    Authorized by: Waylon Tang MD  Comparison: compared with previous ECG from 2/21/2006  Comparison to previous ECG: Right bundle branch block and left anterior fascicular block appear new  Rhythm: sinus rhythm  Rate: normal  BPM: 61  Conduction: right bundle branch block and left anterior fascicular block  ST Segments: ST segments normal  T Waves: T waves normal  QRS axis: normal    Clinical impression: abnormal EKG  Clinical impression comment: This is an abnormal EKG with findings as noted above.  I will ask our staff to reach out for a copy of the 07/20/2021 EKG that is within the old chart for comparison.  Comments: Addendum: Since the previous interpretation, previous EKG dated 2/21/2017 has become available.  When compared to that tracing, there does not appear to be significant change.         Assessment and Plan:   Today, we have reviewed her care.  Her blood pressure is mildly elevated, and we will recheck it before she leaves.  She has also been having an intermittent issue with chest pain.  It does not necessarily seem exertional, but we will check an EKG today as a precaution.  Regarding anxiety, we will continue her current care including lorazepam.  Referral to psychiatry was made for consideration of additional treatment.  Otherwise, we will refill needed medications and update labs as noted below.  Flu and COVID vaccines today.  Tentative follow-up will be in about 6 months, sooner if needed.    Diagnoses and all orders for this visit:    1. Essential hypertension (Primary)  -     Comprehensive Metabolic Panel; Future  -     Urinalysis With Microscopic - Urine, Clean Catch; Future    2. Mixed hyperlipidemia  -     Comprehensive Metabolic Panel; Future  -     simvastatin (ZOCOR) 20 MG tablet; Take 1 tablet by mouth Every Night.  Dispense: 90 tablet; Refill: 3    3. Chest pain, unspecified  type  -     ECG 12 Lead    4. Generalized anxiety disorder  -     sertraline (ZOLOFT) 100 MG tablet; Take 1 tablet by mouth Daily.  Dispense: 90 tablet; Refill: 3  -     LORazepam (ATIVAN) 0.5 MG tablet; Take 0.5-1 tablets by mouth 2 (Two) Times a Day As Needed for Anxiety.  Dispense: 20 tablet; Refill: 0  -     Ambulatory Referral to Psychiatry    5. Stage 3b chronic kidney disease  -     CBC (No Diff); Future  -     Comprehensive Metabolic Panel; Future  -     Urinalysis With Microscopic - Urine, Clean Catch; Future    6. Dysuria  -     CBC (No Diff); Future    7. Folic acid deficiency  -     Vitamin B12 & Folate; Future  -     folic acid (FOLVITE) 1 MG tablet; Take 1 tablet by mouth Daily.  Dispense: 90 tablet; Refill: 3    8. Essential (primary) hypertension  -     NIFEdipine CC (ADALAT CC) 30 MG 24 hr tablet; Take 1 tablet by mouth Every Night.  Dispense: 90 tablet; Refill: 0    9. Other long term (current) drug therapy  -     LORazepam (ATIVAN) 0.5 MG tablet; Take 0.5-1 tablets by mouth 2 (Two) Times a Day As Needed for Anxiety.  Dispense: 20 tablet; Refill: 0    10. Encounter for immunization  -     Fluzone High-Dose 65+yrs  -     COVID-19 (Pfizer) 12yrs+ (COMIRNATY)    Follow Up  Return in about 6 months (around 5/19/2025) for Recheck, Medicare Wellness.  Patient was given instructions and counseling regarding her condition or for health maintenance advice. Please see specific information pulled into the AVS if appropriate.

## 2024-11-20 ENCOUNTER — EXTERNAL PBMM DATA (OUTPATIENT)
Dept: PHARMACY | Facility: OTHER | Age: 79
End: 2024-11-20
Payer: MEDICARE

## 2024-11-20 LAB
FOLATE SERPL-MCNC: >20 NG/ML (ref 4.78–24.2)
VIT B12 BLD-MCNC: 305 PG/ML (ref 211–946)

## 2024-11-20 RX ORDER — CEFADROXIL 500 MG/1
500 CAPSULE ORAL 2 TIMES DAILY
Qty: 14 CAPSULE | Refills: 0 | Status: SHIPPED | OUTPATIENT
Start: 2024-11-20

## 2024-11-21 LAB — BACTERIA SPEC AEROBE CULT: ABNORMAL

## 2024-11-21 NOTE — PROGRESS NOTES
Please let her , Lex, know that the EKG we did this week does not look worrisome necessarily when compared to her most recent tracing.  However, she has not recently had heart testing done.  Because of her concern about intermittent chest pain, I would lean toward moving ahead with a stress test as a precaution.  Confirm if she would be able to walk fast enough on a treadmill to get her heart rate up.  I suspect that we may need to do a chemical stress test.  Let me know if he is agreeable to this and/or if there are other concerns.  Thanks.

## 2024-11-22 NOTE — PROGRESS NOTES
Noted.  Please let him know that I have placed an order for a stress test.  They will use a medication to help get her heart rate up.  It will be a nuclear stress test to give us better images of the heart.  This will be several weeks down the road.  Let me know if he has concerns.  Thanks.

## 2024-11-22 NOTE — PROGRESS NOTES
Pts  inf, he states he doesn't know if she would be able to walk fast enough for treadmill stress test.

## 2024-12-16 ENCOUNTER — OFFICE VISIT (OUTPATIENT)
Dept: ORTHOPEDIC SURGERY | Facility: CLINIC | Age: 79
End: 2024-12-16
Payer: MEDICARE

## 2024-12-16 VITALS
HEART RATE: 70 BPM | DIASTOLIC BLOOD PRESSURE: 95 MMHG | SYSTOLIC BLOOD PRESSURE: 175 MMHG | HEIGHT: 64 IN | WEIGHT: 124 LBS | BODY MASS INDEX: 21.17 KG/M2 | OXYGEN SATURATION: 94 %

## 2024-12-16 DIAGNOSIS — M70.71 BURSITIS OF OTHER BURSA OF RIGHT HIP: ICD-10-CM

## 2024-12-16 DIAGNOSIS — M70.62 TROCHANTERIC BURSITIS OF LEFT HIP: Primary | ICD-10-CM

## 2024-12-16 RX ADMIN — TRIAMCINOLONE ACETONIDE 40 MG: 40 INJECTION, SUSPENSION INTRA-ARTICULAR; INTRAMUSCULAR at 11:42

## 2024-12-16 RX ADMIN — TRIAMCINOLONE ACETONIDE 40 MG: 40 INJECTION, SUSPENSION INTRA-ARTICULAR; INTRAMUSCULAR at 11:43

## 2024-12-16 RX ADMIN — LIDOCAINE HYDROCHLORIDE 5 ML: 10 INJECTION, SOLUTION INFILTRATION; PERINEURAL at 11:42

## 2024-12-16 RX ADMIN — LIDOCAINE HYDROCHLORIDE 5 ML: 10 INJECTION, SOLUTION INFILTRATION; PERINEURAL at 11:43

## 2024-12-16 NOTE — PROGRESS NOTES
"Chief Complaint  Follow-up of the Left Hip and Follow-up of the Right Hip    Subjective          History of Present Illness      Theresa Davis is a 79 y.o. female  presents to [facility] for     Patient presents with her  for follow-up evaluation of bilateral hip bursitis and pain.  She states the last injections helped until a few weeks ago she started having worse pain.  She likes to write lay on her right side and she cannot due to the pain.  She would like to continue injections and request bilateral injections for her hips today      Allergies   Allergen Reactions    Penicillins Rash and GI Intolerance    Sulfa Antibiotics Unknown - Low Severity        Social History     Socioeconomic History    Marital status:     Number of children: 2   Tobacco Use    Smoking status: Never    Smokeless tobacco: Never   Vaping Use    Vaping status: Never Used   Substance and Sexual Activity    Alcohol use: Never    Drug use: Never    Sexual activity: Defer        REVIEW OF SYSTEMS    Constitutional: Awake alert and oriented x3, no acute distress, denies fevers, chills, weight loss  Respiratory: No respiratory distress  Vascular: Brisk cap refill, Intact distal pulses, No cyanosis, compartments soft with no signs or symptoms of compartment syndrome or DVT.   Cardiovascular: Denies chest pain, shortness of breath  Skin: Denies rashes, acute skin changes  Neurologic: Denies headache, loss of consciousness  MSK: Bilateral hip pain      Objective   Vital Signs:   /95   Pulse 70   Ht 162.6 cm (64.02\")   Wt 56.2 kg (124 lb)   SpO2 94%   BMI 21.27 kg/m²     Body mass index is 21.27 kg/m².    Physical Exam       Bilateral hip: Tender to palpation of lateral hip over the greater trochanter, flexion 100, external rotation 35 internal rotation 20, pain with straight leg raise.       Large Joint: R greater trochanteric bursa  Date/Time: 12/16/2024 11:42 AM  Consent given by: " patient  Site marked: site marked  Timeout: Immediately prior to procedure a time out was called to verify the correct patient, procedure, equipment, support staff and site/side marked as required   Supporting Documentation  Indications: pain   Procedure Details  Location: hip - R greater trochanteric bursa  Preparation: Patient was prepped and draped in the usual sterile fashion  Needle size: 22 G (Spinal)  Medications administered: 5 mL lidocaine 1 %; 40 mg triamcinolone acetonide 40 MG/ML  Patient tolerance: patient tolerated the procedure well with no immediate complications      Large Joint: L greater trochanteric bursa  Date/Time: 12/16/2024 11:43 AM  Consent given by: patient  Site marked: site marked  Timeout: Immediately prior to procedure a time out was called to verify the correct patient, procedure, equipment, support staff and site/side marked as required   Supporting Documentation  Indications: pain   Procedure Details  Location: hip - L greater trochanteric bursa  Preparation: Patient was prepped and draped in the usual sterile fashion  Needle size: 22 G (Spinal)  Medications administered: 5 mL lidocaine 1 %; 40 mg triamcinolone acetonide 40 MG/ML  Patient tolerance: patient tolerated the procedure well with no immediate complications    This injection documentation was Scribed for NATA Enrique by Alecia King MA.  12/16/24   11:43 EST    Imaging Results (Most Recent)       None             Result Review :   The following data was reviewed by: NATA Enrique on 12/16/2024:               Assessment and Plan    Diagnoses and all orders for this visit:    1. Trochanteric bursitis of left hip (Primary)    2. Bursitis of other bursa of right hip        Discussed diagnosis and treatment options with the patient and her  she elected to have bilateral hip bursitis injections.  A sterile prep of the injection site was performed with chloraprep. Cryospray was used for  local anesthesia. The site was injected. The patient tolerated the procedure well without complications. Post injection pain was discussed.    The risks, benefits, complications, treatment options/alternatives, and expected outcomes/goals were discussed with the patient.       Call or return if worsening symptoms.    Follow Up   Return in about 3 months (around 3/16/2025) for Recheck.  Patient was given instructions and counseling regarding her condition or for health maintenance advice. Please see specific information pulled into the AVS if appropriate.       EMR Dragon/Transcription disclaimer:  Part of this note may be an electronic transcription/translation of spoken language to printed text using the Dragon Dictation System

## 2024-12-17 RX ORDER — LIDOCAINE HYDROCHLORIDE 10 MG/ML
5 INJECTION, SOLUTION INFILTRATION; PERINEURAL
Status: COMPLETED | OUTPATIENT
Start: 2024-12-16 | End: 2024-12-16

## 2024-12-17 RX ORDER — TRIAMCINOLONE ACETONIDE 40 MG/ML
40 INJECTION, SUSPENSION INTRA-ARTICULAR; INTRAMUSCULAR
Status: COMPLETED | OUTPATIENT
Start: 2024-12-16 | End: 2024-12-16

## 2024-12-30 DIAGNOSIS — Z79.899 OTHER LONG TERM (CURRENT) DRUG THERAPY: ICD-10-CM

## 2024-12-30 DIAGNOSIS — F41.1 GENERALIZED ANXIETY DISORDER: ICD-10-CM

## 2024-12-31 ENCOUNTER — TELEPHONE (OUTPATIENT)
Dept: FAMILY MEDICINE CLINIC | Age: 79
End: 2024-12-31
Payer: MEDICARE

## 2024-12-31 ENCOUNTER — READMISSION MANAGEMENT (OUTPATIENT)
Dept: CALL CENTER | Facility: HOSPITAL | Age: 79
End: 2024-12-31
Payer: MEDICARE

## 2024-12-31 RX ORDER — LORAZEPAM 0.5 MG/1
.25-.5 TABLET ORAL 2 TIMES DAILY PRN
Qty: 20 TABLET | Refills: 0 | Status: SHIPPED | OUTPATIENT
Start: 2024-12-31

## 2024-12-31 NOTE — OUTREACH NOTE
Prep Survey      Flowsheet Row Responses   [facility] facility patient discharged from? Non-BH   Is LACE score < 7 ? Non- Discharge   Eligibility [facility]   Hospital [facility]   Date of Discharge 12/30/24   Discharge diagnosis acute cystitis and Influenza A   Does the patient have one of the following disease processes/diagnoses(primary or secondary)? Other   Prep survey completed? Yes            Marina Vidal Registered Nurse

## 2024-12-31 NOTE — TELEPHONE ENCOUNTER
Scheduled hospital f/u for 1/3/24 with Song due to PCP being unvailable. Pt was admitted on 12/27 for acute cystitis and Influenza A; pt was discharged on 12/30 from [facility].

## 2025-01-02 ENCOUNTER — TRANSITIONAL CARE MANAGEMENT TELEPHONE ENCOUNTER (OUTPATIENT)
Dept: CALL CENTER | Facility: HOSPITAL | Age: 80
End: 2025-01-02
Payer: MEDICARE

## 2025-01-02 NOTE — OUTREACH NOTE
Call Center TCM Note      Flowsheet Row Responses   [facility] patient discharged from? Non-  [[facility]]   Does the patient have one of the following disease processes/diagnoses(primary or secondary)? Other   TCM attempt successful? No   Unsuccessful attempts Attempt 1  [Did not attempt spouse Lex due to him being admitted to the hospital]            Solange Montero RN    1/2/2025, 09:07 EST

## 2025-01-03 ENCOUNTER — TRANSITIONAL CARE MANAGEMENT TELEPHONE ENCOUNTER (OUTPATIENT)
Dept: CALL CENTER | Facility: HOSPITAL | Age: 80
End: 2025-01-03
Payer: MEDICARE

## 2025-01-03 NOTE — OUTREACH NOTE
Call Center TCM Note      Flowsheet Row Responses   [facility] facility patient discharged from? Non-BH  [[facility]]   Does the patient have one of the following disease processes/diagnoses(primary or secondary)? Other   TCM attempt successful? No  [VR for Lex, spouse]   Unsuccessful attempts Attempt 3            Talia Linda RN    1/3/2025, 09:52 EST

## 2025-01-08 ENCOUNTER — TELEPHONE (OUTPATIENT)
Dept: FAMILY MEDICINE CLINIC | Age: 80
End: 2025-01-08
Payer: MEDICARE

## 2025-01-08 NOTE — TELEPHONE ENCOUNTER
Caller: AELXANDRO ADHIKARI    Relationship: Child    Best call back number: [phone]    What is the best time to reach you: CALL AFTER 4     Who are you requesting to speak with (clinical staff, provider,  specific staff member): DR. KIRK WHEELER     What was the call regarding: SON IS CALLING STATING THAT PATIENT HAS BEEN IN [facility] NOW FOR A COUPLE DAYS DUE TO SEPSIS, AND WAS IN THERE A COUPLE OF WEEKS AGO FOR ABOUT 4 DAYS FOR THE SAME THING.  SON DOES NOT LIVE HERE BUT FROM TALKING TO THE PHYISCIAN AT THE HOSPITAL HE IS TELLING HIM THAT SHE NEEDS TO BE PUT IN  A FACILITY FOR AT LEAST SHORT TERM CARE .  HE WOULD LIKE TO TALK TO DR. WHEELER WHEN HE HAS SOME TIME, REGARDING THIS TO GIVE HIM FULL STORY AND TO SEE IF HE CAN HELP.  PLEASE CALL HIM AFTER FOUR.

## 2025-01-08 NOTE — TELEPHONE ENCOUNTER
Inf pts son that he was not on verbal release, so we could not discuss pt care with him. Inf him he could reach out to  at hospital with concerns re: pt care upon discharge.

## 2025-01-10 ENCOUNTER — OUTSIDE FACILITY SERVICE (OUTPATIENT)
Dept: FAMILY MEDICINE CLINIC | Age: 80
End: 2025-01-10
Payer: MEDICARE

## 2025-01-10 PROCEDURE — G0180 MD CERTIFICATION HHA PATIENT: HCPCS | Performed by: FAMILY MEDICINE

## 2025-01-31 ENCOUNTER — OFFICE VISIT (OUTPATIENT)
Dept: FAMILY MEDICINE CLINIC | Age: 80
End: 2025-01-31
Payer: MEDICARE

## 2025-01-31 ENCOUNTER — LAB (OUTPATIENT)
Dept: LAB | Facility: HOSPITAL | Age: 80
End: 2025-01-31
Payer: MEDICARE

## 2025-01-31 VITALS
DIASTOLIC BLOOD PRESSURE: 80 MMHG | HEART RATE: 83 BPM | OXYGEN SATURATION: 98 % | WEIGHT: 116.4 LBS | HEIGHT: 64 IN | TEMPERATURE: 98.2 F | BODY MASS INDEX: 19.87 KG/M2 | SYSTOLIC BLOOD PRESSURE: 122 MMHG

## 2025-01-31 DIAGNOSIS — N18.32 STAGE 3B CHRONIC KIDNEY DISEASE: ICD-10-CM

## 2025-01-31 DIAGNOSIS — I10 ESSENTIAL (PRIMARY) HYPERTENSION: ICD-10-CM

## 2025-01-31 DIAGNOSIS — E83.51 HYPOCALCEMIA: ICD-10-CM

## 2025-01-31 DIAGNOSIS — R93.3 ABNORMAL CT SCAN, COLON: ICD-10-CM

## 2025-01-31 DIAGNOSIS — I10 ESSENTIAL HYPERTENSION: ICD-10-CM

## 2025-01-31 DIAGNOSIS — Z12.11 COLON CANCER SCREENING: ICD-10-CM

## 2025-01-31 DIAGNOSIS — D64.9 ANEMIA, UNSPECIFIED TYPE: ICD-10-CM

## 2025-01-31 DIAGNOSIS — N30.90 CYSTITIS: ICD-10-CM

## 2025-01-31 DIAGNOSIS — M25.551 RIGHT HIP PAIN: ICD-10-CM

## 2025-01-31 DIAGNOSIS — J10.1 INFLUENZA A: Primary | ICD-10-CM

## 2025-01-31 LAB
ANION GAP SERPL CALCULATED.3IONS-SCNC: 11.7 MMOL/L (ref 5–15)
BASOPHILS # BLD AUTO: 0.04 10*3/MM3 (ref 0–0.2)
BASOPHILS NFR BLD AUTO: 0.6 % (ref 0–1.5)
BUN SERPL-MCNC: 17 MG/DL (ref 8–23)
BUN/CREAT SERPL: 13.2 (ref 7–25)
CALCIUM SPEC-SCNC: 9.6 MG/DL (ref 8.6–10.5)
CHLORIDE SERPL-SCNC: 103 MMOL/L (ref 98–107)
CO2 SERPL-SCNC: 24.3 MMOL/L (ref 22–29)
CREAT SERPL-MCNC: 1.29 MG/DL (ref 0.57–1)
DEPRECATED RDW RBC AUTO: 47.6 FL (ref 37–54)
EGFRCR SERPLBLD CKD-EPI 2021: 42.8 ML/MIN/1.73
EOSINOPHIL # BLD AUTO: 0.2 10*3/MM3 (ref 0–0.4)
EOSINOPHIL NFR BLD AUTO: 3.2 % (ref 0.3–6.2)
ERYTHROCYTE [DISTWIDTH] IN BLOOD BY AUTOMATED COUNT: 14 % (ref 12.3–15.4)
GLUCOSE SERPL-MCNC: 98 MG/DL (ref 65–99)
HCT VFR BLD AUTO: 36.9 % (ref 34–46.6)
HGB BLD-MCNC: 12 G/DL (ref 12–15.9)
IMM GRANULOCYTES # BLD AUTO: 0.01 10*3/MM3 (ref 0–0.05)
IMM GRANULOCYTES NFR BLD AUTO: 0.2 % (ref 0–0.5)
LYMPHOCYTES # BLD AUTO: 1.72 10*3/MM3 (ref 0.7–3.1)
LYMPHOCYTES NFR BLD AUTO: 27.6 % (ref 19.6–45.3)
MCH RBC QN AUTO: 30.1 PG (ref 26.6–33)
MCHC RBC AUTO-ENTMCNC: 32.5 G/DL (ref 31.5–35.7)
MCV RBC AUTO: 92.5 FL (ref 79–97)
MONOCYTES # BLD AUTO: 0.46 10*3/MM3 (ref 0.1–0.9)
MONOCYTES NFR BLD AUTO: 7.4 % (ref 5–12)
NEUTROPHILS NFR BLD AUTO: 3.8 10*3/MM3 (ref 1.7–7)
NEUTROPHILS NFR BLD AUTO: 61 % (ref 42.7–76)
PLATELET # BLD AUTO: 397 10*3/MM3 (ref 140–450)
PMV BLD AUTO: 9.5 FL (ref 6–12)
POTASSIUM SERPL-SCNC: 4.2 MMOL/L (ref 3.5–5.2)
PTH-INTACT SERPL-MCNC: 139 PG/ML (ref 15–65)
RBC # BLD AUTO: 3.99 10*6/MM3 (ref 3.77–5.28)
SODIUM SERPL-SCNC: 139 MMOL/L (ref 136–145)
WBC NRBC COR # BLD AUTO: 6.23 10*3/MM3 (ref 3.4–10.8)

## 2025-01-31 PROCEDURE — 1125F AMNT PAIN NOTED PAIN PRSNT: CPT | Performed by: FAMILY MEDICINE

## 2025-01-31 PROCEDURE — 99214 OFFICE O/P EST MOD 30 MIN: CPT | Performed by: FAMILY MEDICINE

## 2025-01-31 PROCEDURE — 3079F DIAST BP 80-89 MM HG: CPT | Performed by: FAMILY MEDICINE

## 2025-01-31 PROCEDURE — 1160F RVW MEDS BY RX/DR IN RCRD: CPT | Performed by: FAMILY MEDICINE

## 2025-01-31 PROCEDURE — 36415 COLL VENOUS BLD VENIPUNCTURE: CPT

## 2025-01-31 PROCEDURE — 1159F MED LIST DOCD IN RCRD: CPT | Performed by: FAMILY MEDICINE

## 2025-01-31 PROCEDURE — 80048 BASIC METABOLIC PNL TOTAL CA: CPT

## 2025-01-31 PROCEDURE — 3074F SYST BP LT 130 MM HG: CPT | Performed by: FAMILY MEDICINE

## 2025-01-31 PROCEDURE — G2211 COMPLEX E/M VISIT ADD ON: HCPCS | Performed by: FAMILY MEDICINE

## 2025-01-31 PROCEDURE — 85025 COMPLETE CBC W/AUTO DIFF WBC: CPT

## 2025-01-31 PROCEDURE — 83970 ASSAY OF PARATHORMONE: CPT

## 2025-01-31 RX ORDER — NIFEDIPINE 30 MG
30 TABLET, EXTENDED RELEASE ORAL NIGHTLY
Qty: 90 TABLET | Refills: 3 | Status: SHIPPED | OUTPATIENT
Start: 2025-01-31

## 2025-01-31 NOTE — PROGRESS NOTES
"Theresa Davis presents to [facility].    Chief Complaint:  Follow up on sepsis, flu A, cystitis, multiple issues    Subjective   History of Present Illness:  Theresa is being seen today for follow-up on her care.  She has been admitted to [facility] twice since she was here most recently.  She apparently had flu a and a cystitis and became septic.  She was admitted and placed on IV antibiotics.  She subsequently developed weakness and fell at home.  She was readmitted to the hospital on 1/7/2025 with a discharge on 1/10/2025.  She also had C. difficile colitis on follow-up presentation.  She has been back home now for about 3 weeks.    She says that she is having difficulty with right hip pain.  She is continuing to have weakness of her lower extremities.  She says that she \"cannot hardly walk\".  She has not had any recent fall.  She is using a walker at home.  Home health is seeing her and working with her at this time.    Review of Systems:  Review of Systems   Constitutional:  Negative for chills and fever.   Respiratory:  Positive for cough. Negative for shortness of breath.    Cardiovascular:  Positive for chest pain (last night it really bothered me). Negative for palpitations.   Gastrointestinal:  Negative for abdominal pain, nausea and vomiting.      Objective   Medical History:  Past Medical History:    Anemia, unspecified    Cervicalgia    Essential (primary) hypertension    Mixed hyperlipidemia    Other long term (current) drug therapy    Other mixed anxiety disorders     Past Surgical History:    APPENDECTOMY    CARPAL TUNNEL RELEASE    COLONOSCOPY    Normal    HYSTERECTOMY    PARTIAL    THORACIC OUTLET SURGERY      Family History   Problem Relation Age of Onset    Breast cancer Mother     Coronary artery disease Father      Social History     Tobacco Use    Smoking status: Never    Smokeless tobacco: Never   Substance Use Topics    Alcohol use: Never       Health Maintenance Due " "  Topic Date Due    TDAP/TD VACCINES (1 - Tdap) Never done    ZOSTER VACCINE (1 of 2) Never done    RSV Vaccine - Adults (1 - 1-dose 75+ series) Never done    ANNUAL WELLNESS VISIT  08/12/2023    DXA SCAN  11/09/2023        Immunization History   Administered Date(s) Administered    COVID-19 (PFIZER) 12YRS+ (COMIRNATY) 11/19/2024    COVID-19 (PFIZER) BIVALENT 12+YRS 03/23/2023    COVID-19 (PFIZER) Purple Cap Monovalent 03/09/2021, 03/30/2021, 12/23/2021    Fluzone High-Dose 65+YRS 10/21/2012, 10/10/2013, 10/02/2017, 11/19/2024    Fluzone High-Dose 65+yrs 01/12/2022, 09/16/2022    Influenza, Unspecified 09/23/2020    Pneumococcal Conjugate 13-Valent (PCV13) 08/12/2015    Pneumococcal Polysaccharide (PPSV23) 08/17/2012, 08/17/2012       Allergies   Allergen Reactions    Penicillins Rash and GI Intolerance    Sulfa Antibiotics Unknown - Low Severity        Medications:    Current Outpatient Medications:     NIFEdipine CC (ADALAT CC) 30 MG 24 hr tablet, Take 1 tablet by mouth Every Night., Disp: 90 tablet, Rfl: 3    denosumab (PROLIA) 60 MG/ML solution prefilled syringe syringe, Inject 1 mL under the skin into the appropriate area as directed Every 6 (Six) Months. T score -2.5, Dx M81.0, Disp: 1 mL, Rfl: 2    folic acid (FOLVITE) 1 MG tablet, Take 1 tablet by mouth Daily., Disp: 90 tablet, Rfl: 3    LORazepam (ATIVAN) 0.5 MG tablet, Take 0.5-1 tablets by mouth 2 (Two) Times a Day As Needed for Anxiety., Disp: 20 tablet, Rfl: 0    sertraline (ZOLOFT) 100 MG tablet, Take 1 tablet by mouth Daily., Disp: 90 tablet, Rfl: 3    simvastatin (ZOCOR) 20 MG tablet, Take 1 tablet by mouth Every Night., Disp: 90 tablet, Rfl: 3    Vital Signs:   /80 (BP Location: Left arm, Patient Position: Sitting)   Pulse 83   Temp 98.2 °F (36.8 °C) (Oral)   Ht 162.6 cm (64.02\")   Wt 52.8 kg (116 lb 6.4 oz)   SpO2 98%   BMI 19.97 kg/m²       Physical Exam:  Physical Exam  Vitals reviewed.   Constitutional:       General: She is not in " acute distress.     Appearance: She is not ill-appearing.   Eyes:      Pupils: Pupils are equal, round, and reactive to light.   Neck:      Comments: No thyromegaly  Cardiovascular:      Rate and Rhythm: Normal rate and regular rhythm.   Pulmonary:      Effort: Pulmonary effort is normal.      Breath sounds: Normal breath sounds.   Abdominal:      General: There is no distension.      Palpations: Abdomen is soft.      Tenderness: There is no abdominal tenderness.   Musculoskeletal:      Cervical back: Neck supple.   Lymphadenopathy:      Cervical: No cervical adenopathy.   Skin:     Findings: No lesion or rash.   Neurological:      Mental Status: She is alert.         Result Review   The following data was reviewed by Waylon Tang MD on 01/31/2025.  Lab Results   Component Value Date    WBC 7.68 11/19/2024    HGB 12.1 11/19/2024    HCT 36.3 11/19/2024    MCV 92.4 11/19/2024     11/19/2024     Lab Results   Component Value Date    GLUCOSE 91 11/19/2024    BUN 17 11/19/2024    CREATININE 1.41 (H) 11/19/2024     11/19/2024    K 4.0 11/19/2024     11/19/2024    CALCIUM 9.6 11/19/2024    PROTEINTOT 7.5 11/19/2024    ALBUMIN 4.3 11/19/2024    ALT 13 11/19/2024    AST 19 11/19/2024    ALKPHOS 91 11/19/2024    BILITOT 0.2 11/19/2024    GLOB 3.2 11/19/2024    AGRATIO 1.3 11/19/2024    BCR 12.1 11/19/2024    ANIONGAP 9.8 11/19/2024    EGFR 38.5 (L) 11/19/2024     Lab Results   Component Value Date    CHOL 177 04/26/2024    CHLPL 190 10/22/2020    TRIG 106 04/26/2024    HDL 59 04/26/2024    LDL 99 04/26/2024     Lab Results   Component Value Date    TSH 3.652 12/26/2024     Lab Results   Component Value Date    HGBA1C 5.30 03/23/2023     CBC with automated diff (01/10/2025 07:18)  COMPREHENSIVE METABOLIC PANEL (01/10/2025 07:18)  MAGNESIUM (01/10/2025 07:18)  Urinalysis With Microscopic - (01/09/2025 08:13)  Respiratory Panel (01/09/2025 08:07)  Lactic acid (SJ) (01/09/2025 05:53)    ECHO COMPLETE  (DOPPLER / COLOR) W OR WO CONTRAST (01/07/2025 11:52)   XR Hip With or Without Pelvis 2 - 3 View Right (01/07/2025 02:52)  XR Chest 1 View (01/07/2025 02:52)  CT Head Without Contrast (01/07/2025 02:36)  CT Abdomen Pelvis With Contrast (01/07/2025 02:34)    BMI is within normal parameters. No other follow-up for BMI required.         Assessment and Plan:   Today, we have reviewed her care including her discharge summary and tests from [facility].  She seems to have recovered for the most part, but she is continuing to have right hip pain.  There is no finding that would suggest fracture.  I have recommended they continue working with home health and physical therapy.  Her blood pressure is well-controlled today, and no change will be made in her medication.  We will repeat some labs as a precaution.  Finally, she was noted to have some thickening of the ascending colon on CT.  That was probably related to C. difficile colitis.  She is not having symptoms of that currently, but she has not had colonoscopy for approximately 13 years.  We will make referral to general surgery for consideration of this.  Tentative follow-up with me will be in May as scheduled, sooner if needed.    Diagnoses and all orders for this visit:    1. Influenza A (Primary)  Comments:  As above.    2. Cystitis  Comments:  As above.    3. Anemia, unspecified type  -     CBC Auto Differential; Future    4. Essential hypertension  -     Basic Metabolic Panel; Future    5. Stage 3b chronic kidney disease  -     Basic Metabolic Panel; Future    6. Right hip pain  Comments:  As above.    7. Essential (primary) hypertension  -     NIFEdipine CC (ADALAT CC) 30 MG 24 hr tablet; Take 1 tablet by mouth Every Night.  Dispense: 90 tablet; Refill: 3    8. Hypocalcemia  -     PTH, Intact; Future    9. Abnormal CT scan, colon  -     Ambulatory Referral For Screening Colonoscopy    10. Colon cancer screening  -     Ambulatory Referral For Screening  Colonoscopy    Follow Up  Return in about 4 months (around 5/22/2025) for Recheck, Next scheduled follow up.  Patient was given instructions and counseling regarding her condition or for health maintenance advice. Please see specific information pulled into the AVS if appropriate.

## 2025-02-17 ENCOUNTER — TELEPHONE (OUTPATIENT)
Dept: FAMILY MEDICINE CLINIC | Age: 80
End: 2025-02-17
Payer: MEDICARE

## 2025-02-17 NOTE — TELEPHONE ENCOUNTER
Please call Theresa.  It looks like she recently missed a scheduled appointment with psychiatry.  Because of the ongoing difficulty that she has with depression and anxiety, I would recommend moving forward with psychiatry evaluation to help with medication management.  If she is agreeable, then please move forward with rescheduling.  Thanks.

## 2025-04-18 ENCOUNTER — OFFICE VISIT (OUTPATIENT)
Dept: FAMILY MEDICINE CLINIC | Age: 80
End: 2025-04-18
Payer: MEDICARE

## 2025-04-18 VITALS
BODY MASS INDEX: 19.36 KG/M2 | SYSTOLIC BLOOD PRESSURE: 133 MMHG | DIASTOLIC BLOOD PRESSURE: 82 MMHG | OXYGEN SATURATION: 95 % | HEART RATE: 78 BPM | WEIGHT: 113.4 LBS | HEIGHT: 64 IN | TEMPERATURE: 98.3 F

## 2025-04-18 DIAGNOSIS — R63.4 UNINTENDED WEIGHT LOSS: ICD-10-CM

## 2025-04-18 DIAGNOSIS — R19.7 DIARRHEA, UNSPECIFIED TYPE: Primary | ICD-10-CM

## 2025-04-18 PROCEDURE — 3079F DIAST BP 80-89 MM HG: CPT | Performed by: NURSE PRACTITIONER

## 2025-04-18 PROCEDURE — 3075F SYST BP GE 130 - 139MM HG: CPT | Performed by: NURSE PRACTITIONER

## 2025-04-18 PROCEDURE — 99213 OFFICE O/P EST LOW 20 MIN: CPT | Performed by: NURSE PRACTITIONER

## 2025-04-18 PROCEDURE — 1125F AMNT PAIN NOTED PAIN PRSNT: CPT | Performed by: NURSE PRACTITIONER

## 2025-04-18 NOTE — PROGRESS NOTES
"Theresa Davis presents to [facility] with complaint of  Diarrhea (X 2 weeks ), Weight Loss (Pts granddaughter states she is losing weight rapidly ), Vomiting, and Headache    SUBJECTIVE  Diarrhea   This is a new problem. Episode onset: 2 weeks ago. Episode frequency: 2-3 times per day. The problem has been unchanged. Diarrhea characteristics: loose. The patient states that diarrhea does not awaken her from sleep. Associated symptoms include abdominal pain (generalized, crampy), vomiting (only one episode yesterday) and weight loss (per grnadaughter, wt loss has been gradual over past 3 yrs. Patient skips meals, no appetite). Pertinent negatives include no arthralgias, bloating, chills, coughing, fever, headaches, increased  flatus, myalgias, sweats or URI. Nothing aggravates the symptoms. There are no known risk factors (no one in home has similar symptoms). She has tried anti-motility drug for the symptoms. The treatment provided mild relief. c diff 3 months ago           OBJECTIVE  Vital Signs:   /82 (BP Location: Left arm, Patient Position: Sitting, Cuff Size: Small Adult)   Pulse 78   Temp 98.3 °F (36.8 °C) (Oral)   Ht 162.6 cm (64.02\")   Wt 51.4 kg (113 lb 6.4 oz)   SpO2 95%   BMI 19.46 kg/m²       Physical Exam  Vitals reviewed.   Constitutional:       General: She is not in acute distress.     Appearance: Normal appearance. She is not ill-appearing.   HENT:      Head: Normocephalic and atraumatic.   Cardiovascular:      Rate and Rhythm: Normal rate and regular rhythm.      Pulses: Normal pulses.      Heart sounds: Normal heart sounds.   Pulmonary:      Effort: Pulmonary effort is normal.      Breath sounds: Normal breath sounds.   Abdominal:      General: There is no distension.      Palpations: Abdomen is soft.      Tenderness: There is no abdominal tenderness. There is no guarding or rebound. Negative signs include Cid's sign and McBurney's sign. "   Musculoskeletal:      Cervical back: Neck supple.   Skin:     General: Skin is warm and dry.   Neurological:      General: No focal deficit present.      Mental Status: She is alert and oriented to person, place, and time. Mental status is at baseline.   Psychiatric:         Mood and Affect: Mood normal.         Behavior: Behavior normal.         Judgment: Judgment normal.              ASSESSMENT AND PLAN:  Diagnoses and all orders for this visit:    1. Diarrhea, unspecified type (Primary)  -     Clostridioides difficile EIA - Stool, Per Rectum; Future  -     Enteric Bacterial Panel - Stool, Per Rectum; Future  -     Enteric Parasite Panel - Stool, Per Rectum; Future  -     Clostridioides difficile Toxin, PCR - Stool, Per Rectum; Future  -     Pancreatic Elastase, Fecal - Stool, Per Rectum; Future  -     Occult Blood, Fecal By Immunoassay - Stool, Per Rectum; Future  -     H. Pylori Antigen, Stool - Stool, Per Rectum; Future  -     Fecal Lactoferrin Qual. - Stool, Per Rectum; Future    2. Unintended weight loss      Checking stool studies as above.  Patient is drinking water adequately so not concerned for dehydration at this time.  She was encouraged to continue pushing fluids.  She understands emergency room precautions.    Weight loss has been gradual over several years.  In the past 6 months however she has lost 9 pounds.  Patient says her PCP is aware.  Does not seem patient is eating regular meals however.  Explained that this will contribute to weight loss over time.  She was encouraged to eat at least a minimum 2 meals per day with snacking in between.  Also encouraged to increase protein intake.  She may try drinking boost or Ensure protein shakes.  She has a regular follow-up visit with her PCP in 1 month.  She may need to consider appetite stimulant if weight not improving.    Follow Up   Return if symptoms worsen or fail to improve, for Next scheduled follow up. Patient to notify office with any acute  concerns or issues.  Patient verbalizes understanding, agrees with plan of care and has no further questions upon discharge.     Patient was given instructions and counseling regarding her condition or for health maintenance advice. Please see specific information pulled into the AVS if appropriate.     Discussed the importance of following up with any needed screening tests/labs/specialist appointments and any requested follow-up recommended by me today. Importance of maintaining follow-up discussed and patient accepts that missed appointments can delay diagnosis and potentially lead to worsening of conditions.    Part of this note may be an electronic transcription/translation of spoken language to printed text using the Dragon Dictation System.

## 2025-04-21 ENCOUNTER — OFFICE VISIT (OUTPATIENT)
Dept: ORTHOPEDIC SURGERY | Facility: CLINIC | Age: 80
End: 2025-04-21
Payer: MEDICARE

## 2025-04-21 VITALS
WEIGHT: 113 LBS | SYSTOLIC BLOOD PRESSURE: 113 MMHG | HEIGHT: 64 IN | HEART RATE: 71 BPM | BODY MASS INDEX: 19.29 KG/M2 | DIASTOLIC BLOOD PRESSURE: 77 MMHG | OXYGEN SATURATION: 96 %

## 2025-04-21 DIAGNOSIS — M70.62 TROCHANTERIC BURSITIS OF LEFT HIP: Primary | ICD-10-CM

## 2025-04-21 DIAGNOSIS — M70.71 BURSITIS OF OTHER BURSA OF RIGHT HIP: ICD-10-CM

## 2025-04-21 PROCEDURE — 20610 DRAIN/INJ JOINT/BURSA W/O US: CPT | Performed by: PHYSICIAN ASSISTANT

## 2025-04-21 PROCEDURE — 1160F RVW MEDS BY RX/DR IN RCRD: CPT | Performed by: PHYSICIAN ASSISTANT

## 2025-04-21 PROCEDURE — 3074F SYST BP LT 130 MM HG: CPT | Performed by: PHYSICIAN ASSISTANT

## 2025-04-21 PROCEDURE — 1159F MED LIST DOCD IN RCRD: CPT | Performed by: PHYSICIAN ASSISTANT

## 2025-04-21 PROCEDURE — 3078F DIAST BP <80 MM HG: CPT | Performed by: PHYSICIAN ASSISTANT

## 2025-04-21 RX ORDER — LIDOCAINE HYDROCHLORIDE 10 MG/ML
5 INJECTION, SOLUTION INFILTRATION; PERINEURAL
Status: COMPLETED | OUTPATIENT
Start: 2025-04-21 | End: 2025-04-21

## 2025-04-21 RX ORDER — TRIAMCINOLONE ACETONIDE 40 MG/ML
40 INJECTION, SUSPENSION INTRA-ARTICULAR; INTRAMUSCULAR
Status: COMPLETED | OUTPATIENT
Start: 2025-04-21 | End: 2025-04-21

## 2025-04-21 RX ADMIN — LIDOCAINE HYDROCHLORIDE 5 ML: 10 INJECTION, SOLUTION INFILTRATION; PERINEURAL at 15:49

## 2025-04-21 RX ADMIN — TRIAMCINOLONE ACETONIDE 40 MG: 40 INJECTION, SUSPENSION INTRA-ARTICULAR; INTRAMUSCULAR at 15:50

## 2025-04-21 RX ADMIN — TRIAMCINOLONE ACETONIDE 40 MG: 40 INJECTION, SUSPENSION INTRA-ARTICULAR; INTRAMUSCULAR at 15:49

## 2025-04-21 RX ADMIN — LIDOCAINE HYDROCHLORIDE 5 ML: 10 INJECTION, SOLUTION INFILTRATION; PERINEURAL at 15:50

## 2025-04-21 NOTE — PROGRESS NOTES
"Chief Complaint  Follow-up of the Right Hip and Follow-up of the Left Hip    Subjective          History of Present Illness      Theresa Davis is a 79 y.o. female  presents to [facility] for     Patient presents for follow-up evaluation of bilateral hip bursitis, bilateral hip pain.  She states the pain has returned recently she points to the lateral hip of bilateral hips as her areas of pain she admits to pain sleeping on her side at night she is requesting bilateral hip injection she has had good results in the past      Allergies   Allergen Reactions    Penicillins GI Intolerance, Rash and Nausea And Vomiting    Sulfa Antibiotics Unknown - Low Severity        Social History     Socioeconomic History    Marital status:     Number of children: 2   Tobacco Use    Smoking status: Never    Smokeless tobacco: Never   Vaping Use    Vaping status: Never Used   Substance and Sexual Activity    Alcohol use: Never    Drug use: Never    Sexual activity: Defer        REVIEW OF SYSTEMS    Constitutional: Awake alert and oriented x3, no acute distress, denies fevers, chills, weight loss  Respiratory: No respiratory distress  Vascular: Brisk cap refill, Intact distal pulses, No cyanosis, compartments soft with no signs or symptoms of compartment syndrome or DVT.   Cardiovascular: Denies chest pain, shortness of breath  Skin: Denies rashes, acute skin changes  Neurologic: Denies headache, loss of consciousness  MSK: Bilateral hip pain      Objective   Vital Signs:   /77   Pulse 71   Ht 162.6 cm (64.02\")   Wt 51.3 kg (113 lb)   SpO2 96%   BMI 19.39 kg/m²     Body mass index is 19.39 kg/m².    Physical Exam       Bilateral hip: Tender to palpation of lateral hip over the greater trochanter, flexion 100, external rotation 35 internal rotation 20, pain with straight leg raise.       Large Joint: R greater trochanteric bursa  Date/Time: 4/21/2025 3:49 PM  Consent given by: patient  Site " marked: site marked  Timeout: Immediately prior to procedure a time out was called to verify the correct patient, procedure, equipment, support staff and site/side marked as required   Supporting Documentation  Indications: pain   Procedure Details  Location: hip - R greater trochanteric bursa  Preparation: Patient was prepped and draped in the usual sterile fashion  Needle gauge: 21g.  Medications administered: 5 mL lidocaine 1 %; 40 mg triamcinolone acetonide 40 MG/ML  Patient tolerance: patient tolerated the procedure well with no immediate complications      Large Joint: L greater trochanteric bursa  Date/Time: 4/21/2025 3:50 PM  Consent given by: patient  Site marked: site marked  Timeout: Immediately prior to procedure a time out was called to verify the correct patient, procedure, equipment, support staff and site/side marked as required   Supporting Documentation  Indications: pain   Procedure Details  Location: hip - L greater trochanteric bursa  Preparation: Patient was prepped and draped in the usual sterile fashion  Needle gauge: 21g.  Medications administered: 5 mL lidocaine 1 %; 40 mg triamcinolone acetonide 40 MG/ML  Patient tolerance: patient tolerated the procedure well with no immediate complications      This injection documentation was Scribed for NATA Enrique by Alecia King MA.  04/21/25   15:50 EDT    Imaging Results (Most Recent)       None                   Assessment and Plan    Diagnoses and all orders for this visit:    1. Trochanteric bursitis of left hip (Primary)  -     Large Joint: L greater trochanteric bursa    2. Bursitis of other bursa of right hip  -     Large Joint: R greater trochanteric bursa        Discussed diagnosis and treatment options with the patient and her  she elected to have bilateral hip bursitis injections.  A sterile prep of the injection site was performed with chloraprep. Cryospray was used for local anesthesia. The site was injected.  The patient tolerated the procedure well without complications. Post injection pain was discussed.    The risks, benefits, complications, treatment options/alternatives, and expected outcomes/goals were discussed with the patient.   Follow-up 3 months    Call or return if worsening symptoms.    Follow Up   Return in about 3 months (around 7/21/2025) for Recheck.  Patient was given instructions and counseling regarding her condition or for health maintenance advice. Please see specific information pulled into the AVS if appropriate.       EMR Dragon/Transcription disclaimer:  Part of this note may be an electronic transcription/translation of spoken language to printed text using the Dragon Dictation System

## 2025-04-24 ENCOUNTER — LAB (OUTPATIENT)
Dept: LAB | Facility: HOSPITAL | Age: 80
End: 2025-04-24
Payer: MEDICARE

## 2025-04-24 DIAGNOSIS — R19.7 DIARRHEA, UNSPECIFIED TYPE: ICD-10-CM

## 2025-04-24 LAB
027 TOXIN: NORMAL
C DIFF TOX GENS STL QL NAA+PROBE: NEGATIVE
H. PYLORI ANTIGEN STOOL: NEGATIVE
HEMOCCULT STL QL IA: NEGATIVE
LACTOFERRIN STL QL LA: NEGATIVE

## 2025-04-24 PROCEDURE — 87324 CLOSTRIDIUM AG IA: CPT

## 2025-04-24 PROCEDURE — 87338 HPYLORI STOOL AG IA: CPT

## 2025-04-24 PROCEDURE — 83630 LACTOFERRIN FECAL (QUAL): CPT

## 2025-04-24 PROCEDURE — 87506 IADNA-DNA/RNA PROBE TQ 6-11: CPT

## 2025-04-24 PROCEDURE — 82274 ASSAY TEST FOR BLOOD FECAL: CPT

## 2025-04-24 PROCEDURE — 87493 C DIFF AMPLIFIED PROBE: CPT

## 2025-04-24 PROCEDURE — 82653 EL-1 FECAL QUANTITATIVE: CPT

## 2025-04-25 ENCOUNTER — EXTERNAL PBMM DATA (OUTPATIENT)
Dept: PHARMACY | Facility: OTHER | Age: 80
End: 2025-04-25
Payer: MEDICARE

## 2025-04-25 LAB
C COLI+JEJ+UPSA DNA STL QL NAA+NON-PROBE: NOT DETECTED
CRYPTOSP DNA STL QL NAA+NON-PROBE: NOT DETECTED
E HISTOLYT DNA STL QL NAA+NON-PROBE: NOT DETECTED
EC STX1+STX2 GENES STL QL NAA+NON-PROBE: NOT DETECTED
G LAMBLIA DNA STL QL NAA+NON-PROBE: NOT DETECTED
S ENT+BONG DNA STL QL NAA+NON-PROBE: NOT DETECTED
SHIGELLA SP+EIEC IPAH ST NAA+NON-PROBE: NOT DETECTED

## 2025-04-26 LAB — C DIFF TOX A+B STL QL IA: NEGATIVE

## 2025-04-28 ENCOUNTER — RESULTS FOLLOW-UP (OUTPATIENT)
Dept: FAMILY MEDICINE CLINIC | Age: 80
End: 2025-04-28
Payer: MEDICARE

## 2025-04-28 LAB — ELASTASE PANC STL-MCNT: 532 UG ELAST./G

## 2025-04-29 ENCOUNTER — TELEPHONE (OUTPATIENT)
Dept: FAMILY MEDICINE CLINIC | Age: 80
End: 2025-04-29
Payer: MEDICARE

## 2025-04-29 NOTE — TELEPHONE ENCOUNTER
Caller: JERRY VAZQUEZ    Relationship: DAUGHTER     Best call back number: [phone]    What is the best time to reach you: ANYTIME     Who are you requesting to speak with (clinical staff, provider,  specific staff member): NURSE     What was the call regarding: PATIENT'S DAUGHTER IS CALLING WANTING TO GET THE LAB RESULTS FOR HER MOTHER, SHE IS NOT ON [facility].

## 2025-06-17 ENCOUNTER — EXTERNAL PBMM DATA (OUTPATIENT)
Dept: PHARMACY | Facility: OTHER | Age: 80
End: 2025-06-17
Payer: MEDICARE

## 2025-06-27 ENCOUNTER — EXTERNAL PBMM DATA (OUTPATIENT)
Dept: PHARMACY | Facility: OTHER | Age: 80
End: 2025-06-27
Payer: MEDICARE

## 2025-07-11 ENCOUNTER — EXTERNAL PBMM DATA (OUTPATIENT)
Dept: PHARMACY | Facility: OTHER | Age: 80
End: 2025-07-11
Payer: MEDICARE